# Patient Record
Sex: FEMALE | Race: WHITE | NOT HISPANIC OR LATINO | Employment: FULL TIME | URBAN - METROPOLITAN AREA
[De-identification: names, ages, dates, MRNs, and addresses within clinical notes are randomized per-mention and may not be internally consistent; named-entity substitution may affect disease eponyms.]

---

## 2016-06-01 LAB — PAP-ABSTRACT: NORMAL

## 2017-06-06 ENCOUNTER — OFFICE VISIT (OUTPATIENT)
Dept: FAMILY MEDICINE | Facility: CLINIC | Age: 27
End: 2017-06-06
Payer: COMMERCIAL

## 2017-06-06 VITALS
TEMPERATURE: 98.9 F | HEIGHT: 69 IN | BODY MASS INDEX: 27.4 KG/M2 | HEART RATE: 88 BPM | WEIGHT: 185 LBS | DIASTOLIC BLOOD PRESSURE: 86 MMHG | SYSTOLIC BLOOD PRESSURE: 132 MMHG

## 2017-06-06 DIAGNOSIS — Z01.419 ENCOUNTER FOR GYNECOLOGICAL EXAMINATION WITHOUT ABNORMAL FINDING: Primary | ICD-10-CM

## 2017-06-06 DIAGNOSIS — Z30.41 ENCOUNTER FOR SURVEILLANCE OF CONTRACEPTIVE PILLS: ICD-10-CM

## 2017-06-06 LAB
ALBUMIN SERPL-MCNC: 3.6 G/DL (ref 3.4–5)
ALP SERPL-CCNC: 85 U/L (ref 40–150)
ALT SERPL W P-5'-P-CCNC: 21 U/L (ref 0–50)
ANION GAP SERPL CALCULATED.3IONS-SCNC: 10 MMOL/L (ref 3–14)
AST SERPL W P-5'-P-CCNC: 14 U/L (ref 0–45)
BILIRUB SERPL-MCNC: 0.4 MG/DL (ref 0.2–1.3)
BUN SERPL-MCNC: 12 MG/DL (ref 7–30)
CALCIUM SERPL-MCNC: 8.6 MG/DL (ref 8.5–10.1)
CHLORIDE SERPL-SCNC: 104 MMOL/L (ref 94–109)
CO2 SERPL-SCNC: 24 MMOL/L (ref 20–32)
CREAT SERPL-MCNC: 0.6 MG/DL (ref 0.52–1.04)
ERYTHROCYTE [DISTWIDTH] IN BLOOD BY AUTOMATED COUNT: 12.9 % (ref 10–15)
GFR SERPL CREATININE-BSD FRML MDRD: NORMAL ML/MIN/1.7M2
GLUCOSE SERPL-MCNC: 83 MG/DL (ref 70–99)
HCT VFR BLD AUTO: 36.2 % (ref 35–47)
HGB BLD-MCNC: 11.9 G/DL (ref 11.7–15.7)
MCH RBC QN AUTO: 31.4 PG (ref 26.5–33)
MCHC RBC AUTO-ENTMCNC: 32.9 G/DL (ref 31.5–36.5)
MCV RBC AUTO: 96 FL (ref 78–100)
PLATELET # BLD AUTO: 334 10E9/L (ref 150–450)
POTASSIUM SERPL-SCNC: 3.5 MMOL/L (ref 3.4–5.3)
PROT SERPL-MCNC: 7.2 G/DL (ref 6.8–8.8)
RBC # BLD AUTO: 3.79 10E12/L (ref 3.8–5.2)
SODIUM SERPL-SCNC: 138 MMOL/L (ref 133–144)
TSH SERPL DL<=0.005 MIU/L-ACNC: 2.78 MU/L (ref 0.4–4)
WBC # BLD AUTO: 9.2 10E9/L (ref 4–11)

## 2017-06-06 PROCEDURE — 85027 COMPLETE CBC AUTOMATED: CPT | Performed by: NURSE PRACTITIONER

## 2017-06-06 PROCEDURE — 80053 COMPREHEN METABOLIC PANEL: CPT | Performed by: NURSE PRACTITIONER

## 2017-06-06 PROCEDURE — 84443 ASSAY THYROID STIM HORMONE: CPT | Performed by: NURSE PRACTITIONER

## 2017-06-06 PROCEDURE — 99395 PREV VISIT EST AGE 18-39: CPT | Performed by: NURSE PRACTITIONER

## 2017-06-06 PROCEDURE — 36415 COLL VENOUS BLD VENIPUNCTURE: CPT | Performed by: NURSE PRACTITIONER

## 2017-06-06 RX ORDER — NORGESTIMATE AND ETHINYL ESTRADIOL 7DAYSX3 28
1 KIT ORAL DAILY
Qty: 84 TABLET | Refills: 3 | Status: SHIPPED | OUTPATIENT
Start: 2017-06-06 | End: 2021-08-10

## 2017-06-06 RX ORDER — CETIRIZINE HYDROCHLORIDE 10 MG/1
10 TABLET ORAL PRN
COMMUNITY
End: 2022-02-22

## 2017-06-06 NOTE — NURSING NOTE
"Chief Complaint   Patient presents with     Physical       Initial /86 (Cuff Size: Adult Regular)  Pulse 88  Temp 98.9  F (37.2  C) (Tympanic)  Ht 5' 9.25\" (1.759 m)  Wt 185 lb (83.9 kg)  LMP 05/24/2017  BMI 27.12 kg/m2 Estimated body mass index is 27.12 kg/(m^2) as calculated from the following:    Height as of this encounter: 5' 9.25\" (1.759 m).    Weight as of this encounter: 185 lb (83.9 kg).  Medication Reconciliation: complete    Health Maintenance that is potentially due pending provider review:  Pap Smear    Pt had PAP done on this date June 2016 , with this group [facility], results were normal      "

## 2017-06-06 NOTE — MR AVS SNAPSHOT
After Visit Summary   6/6/2017    Abimbola Monreal    MRN: [id number]           Patient Information     Date Of Birth          1990        Visit Information        Provider Department      6/6/2017 3:20 PM Monica Fields APRN [facility]        Today's Diagnoses     Encounter for gynecological examination without abnormal finding    -  1    Encounter for surveillance of contraceptive pills          Care Instructions      Preventive Health Recommendations  Female Ages 26 - 39  Yearly exam:   See your health care provider every year in order to    Review health changes.     Discuss preventive care.      Review your medicines if you your doctor has prescribed any.    Until age 30: Get a Pap test every three years (more often if you have had an abnormal result).    After age 30: Talk to your doctor about whether you should have a Pap test every 3 years or have a Pap test with HPV screening every 5 years.   You do not need a Pap test if your uterus was removed (hysterectomy) and you have not had cancer.  You should be tested each year for STDs (sexually transmitted diseases), if you're at risk.   Talk to your provider about how often to have your cholesterol checked.  If you are at risk for diabetes, you should have a diabetes test (fasting glucose).  Shots: Get a flu shot each year. Get a tetanus shot every 10 years.   Nutrition:     Eat at least 5 servings of fruits and vegetables each day.    Eat whole-grain bread, whole-wheat pasta and brown rice instead of white grains and rice.    Talk to your provider about Calcium and Vitamin D.     Lifestyle    Exercise at least 150 minutes a week (30 minutes a day, 5 days of the week). This will help you control your weight and prevent disease.    Limit alcohol to one drink per day.    No smoking.     Wear sunscreen to prevent skin cancer.    See your dentist every six months for an exam and cleaning.            Follow-ups after  "your visit        Who to contact     If you have questions or need follow up information about today's clinic visit or your schedule please contact [facility] directly at [phone].  Normal or non-critical lab and imaging results will be communicated to you by MyChart, letter or phone within 4 business days after the clinic has received the results. If you do not hear from us within 7 days, please contact the clinic through MyChart or phone. If you have a critical or abnormal lab result, we will notify you by phone as soon as possible.  Submit refill requests through [email or web address] or call your pharmacy and they will forward the refill request to us. Please allow 3 business days for your refill to be completed.          Additional Information About Your Visit        [facility] Information     [email or web address] lets you send messages to your doctor, view your test results, renew your prescriptions, schedule appointments and more. To sign up, go to [email or web address] . Click on \"Log in\" on the left side of the screen, which will take you to the Welcome page. Then click on \"Sign up Now\" on the right side of the page.     You will be asked to enter the access code listed below, as well as some personal information. Please follow the directions to create your username and password.     Your access code is: [email or web address]  Expires: 2017  3:57 PM     Your access code will  in 90 days. If you need help or a new code, please call your [facility] clinic or [phone].        Care EveryWhere ID     This is your Care EveryWhere ID. This could be used by other organizations to access your [facility] medical records  [id number]        Your Vitals Were     Pulse Temperature Height Last Period BMI (Body Mass Index)       88 98.9  F (37.2  C) (Tympanic) 5' 9.25\" (1.759 m) 2017 27.12 kg/m2        Blood Pressure from Last 3 Encounters:   17 132/86   16 (!) 131/95   02/20/15 130/80    Weight from Last 3 " Encounters:   06/06/17 185 lb (83.9 kg)   04/29/16 167 lb 12.8 oz (76.1 kg)   02/20/15 159 lb (72.1 kg)              We Performed the Following     ABSTRACT PAP-NO CHARGE     CBC with platelets     Comprehensive metabolic panel     TSH with free T4 reflex          Today's Medication Changes          These changes are accurate as of: 6/6/17  3:57 PM.  If you have any questions, ask your nurse or doctor.               Start taking these medicines.        Dose/Directions    norgestim-eth estrad triphasic 0.18/0.215/0.25 MG-35 MCG per tablet   Commonly known as:  TRINESSA (28)   Used for:  Encounter for surveillance of contraceptive pills   Started by:  Monica Fields APRN CNP        Dose:  1 tablet   Take 1 tablet by mouth daily   Quantity:  84 tablet   Refills:  3         Stop taking these medicines if you haven't already. Please contact your care team if you have questions.     Levonorgestrel-Eth Estrad & FA 0.1-20 & 1 MG-MCG &MG Kit   Stopped by:  Monica Fields APRN CNP                Where to get your medicines      These medications were sent to [id number] - [place], [place] - [address]   [address] [facility] [address]     Phone:  [phone]     norgestim-eth estrad triphasic 0.18/0.215/0.25 MG-35 MCG per tablet                Primary Care Provider Office Phone # Fax #    Edward Thomas [phone]       [facility] [address] [facility] [address]        Thank you!     Thank you for choosing [facility]  for your care. Our goal is always to provide you with excellent care. Hearing back from our patients is one way we can continue to improve our services. Please take a few minutes to complete the written survey that you may receive in the mail after your visit with us. Thank you!             Your Updated Medication List - Protect others around you: Learn how to safely use, store and throw away your medicines at  www.disposemymeds.org.          This list is accurate as of: 6/6/17  3:57 PM.  Always use your most recent med list.                   Brand Name Dispense Instructions for use    cetirizine 10 MG tablet    zyrTEC     Take 10 mg by mouth as needed for allergies       norgestim-eth estrad triphasic 0.18/0.215/0.25 MG-35 MCG per tablet    TRINESSA (28)    84 tablet    Take 1 tablet by mouth daily
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
## 2017-06-06 NOTE — LETTER
[facility]  [address] [place] [address]  [phone]    June 6, 2017        Abimbola Monreal  [address] [facility] [address]          To whom it may concern:    This patient was seen for a wellness exam 6/6/2017.    Please contact me for questions or concerns.        Sincerely,        Monica Fields CNP

## 2017-11-10 ENCOUNTER — RECORDS - HEALTHEAST (OUTPATIENT)
Dept: LAB | Facility: HOSPITAL | Age: 27
End: 2017-11-10

## 2017-11-10 LAB — HBV SURFACE AB SERPL IA-ACNC: NEGATIVE M[IU]/ML

## 2018-01-24 ENCOUNTER — RECORDS - HEALTHEAST (OUTPATIENT)
Dept: LAB | Facility: HOSPITAL | Age: 28
End: 2018-01-24

## 2018-01-24 LAB — HBV SURFACE AB SERPL IA-ACNC: POSITIVE M[IU]/ML

## 2018-04-11 ENCOUNTER — OFFICE VISIT - HEALTHEAST (OUTPATIENT)
Dept: FAMILY MEDICINE | Facility: CLINIC | Age: 28
End: 2018-04-11

## 2018-04-11 ENCOUNTER — COMMUNICATION - HEALTHEAST (OUTPATIENT)
Dept: TELEHEALTH | Facility: CLINIC | Age: 28
End: 2018-04-11

## 2018-04-11 DIAGNOSIS — Z30.09 GENERAL COUNSELING AND ADVICE FOR CONTRACEPTIVE MANAGEMENT: ICD-10-CM

## 2018-04-11 ASSESSMENT — MIFFLIN-ST. JEOR: SCORE: 1589.05

## 2018-07-26 ENCOUNTER — OFFICE VISIT - HEALTHEAST (OUTPATIENT)
Dept: FAMILY MEDICINE | Facility: CLINIC | Age: 28
End: 2018-07-26

## 2018-07-26 DIAGNOSIS — F33.9 MAJOR DEPRESSION, RECURRENT (H): ICD-10-CM

## 2018-07-26 ASSESSMENT — MIFFLIN-ST. JEOR: SCORE: 1610.03

## 2018-07-30 ENCOUNTER — OFFICE VISIT - HEALTHEAST (OUTPATIENT)
Dept: FAMILY MEDICINE | Facility: CLINIC | Age: 28
End: 2018-07-30

## 2018-07-30 DIAGNOSIS — F41.9 ANXIETY: ICD-10-CM

## 2018-07-30 ASSESSMENT — MIFFLIN-ST. JEOR: SCORE: 1598.69

## 2018-08-07 ENCOUNTER — COMMUNICATION - HEALTHEAST (OUTPATIENT)
Dept: TELEHEALTH | Facility: CLINIC | Age: 28
End: 2018-08-07

## 2018-08-07 ENCOUNTER — COMMUNICATION - HEALTHEAST (OUTPATIENT)
Dept: FAMILY MEDICINE | Facility: CLINIC | Age: 28
End: 2018-08-07

## 2018-09-04 ENCOUNTER — OFFICE VISIT - HEALTHEAST (OUTPATIENT)
Dept: FAMILY MEDICINE | Facility: CLINIC | Age: 28
End: 2018-09-04

## 2018-09-04 DIAGNOSIS — F41.9 ANXIETY: ICD-10-CM

## 2018-09-04 DIAGNOSIS — F32.1 MAJOR DEPRESSIVE DISORDER, SINGLE EPISODE, MODERATE (H): ICD-10-CM

## 2018-09-04 ASSESSMENT — MIFFLIN-ST. JEOR: SCORE: 1619.1

## 2018-10-10 ENCOUNTER — HOSPITAL ENCOUNTER (EMERGENCY)
Facility: CLINIC | Age: 28
Discharge: HOME | End: 2018-10-10
Attending: EMERGENCY MEDICINE | Admitting: EMERGENCY MEDICINE
Payer: COMMERCIAL

## 2018-10-10 ENCOUNTER — RECORDS - HEALTHEAST (OUTPATIENT)
Dept: ADMINISTRATIVE | Facility: OTHER | Age: 28
End: 2018-10-10

## 2018-10-10 DIAGNOSIS — T44.3X1A ANTICHOLINERGIC DRUG OVERDOSE, ACCIDENTAL OR UNINTENTIONAL, INITIAL ENCOUNTER: ICD-10-CM

## 2018-10-10 DIAGNOSIS — E87.6 HYPOKALEMIA: ICD-10-CM

## 2018-10-10 LAB
ANION GAP SERPL CALCULATED.3IONS-SCNC: 11 MMOL/L (ref 3–14)
APAP SERPL-MCNC: <2 MG/L (ref 10–20)
BASOPHILS # BLD AUTO: 0 10E9/L (ref 0–0.2)
BASOPHILS NFR BLD AUTO: 0.3 %
BUN SERPL-MCNC: 9 MG/DL (ref 7–30)
CALCIUM SERPL-MCNC: 8.3 MG/DL (ref 8.5–10.1)
CHLORIDE SERPL-SCNC: 103 MMOL/L (ref 94–109)
CO2 SERPL-SCNC: 23 MMOL/L (ref 20–32)
CREAT SERPL-MCNC: 0.63 MG/DL (ref 0.52–1.04)
DIFFERENTIAL METHOD BLD: NORMAL
EOSINOPHIL # BLD AUTO: 0.3 10E9/L (ref 0–0.7)
EOSINOPHIL NFR BLD AUTO: 4.1 %
ERYTHROCYTE [DISTWIDTH] IN BLOOD BY AUTOMATED COUNT: 13.4 % (ref 10–15)
GFR SERPL CREATININE-BSD FRML MDRD: >90 ML/MIN/1.7M2
GLUCOSE SERPL-MCNC: 104 MG/DL (ref 70–99)
HCT VFR BLD AUTO: 42.2 % (ref 35–47)
HGB BLD-MCNC: 13.6 G/DL (ref 11.7–15.7)
IMM GRANULOCYTES # BLD: 0 10E9/L (ref 0–0.4)
IMM GRANULOCYTES NFR BLD: 0.3 %
LYMPHOCYTES # BLD AUTO: 3.1 10E9/L (ref 0.8–5.3)
LYMPHOCYTES NFR BLD AUTO: 43.7 %
MCH RBC QN AUTO: 31.1 PG (ref 26.5–33)
MCHC RBC AUTO-ENTMCNC: 32.2 G/DL (ref 31.5–36.5)
MCV RBC AUTO: 96 FL (ref 78–100)
MONOCYTES # BLD AUTO: 0.7 10E9/L (ref 0–1.3)
MONOCYTES NFR BLD AUTO: 10 %
NEUTROPHILS # BLD AUTO: 3 10E9/L (ref 1.6–8.3)
NEUTROPHILS NFR BLD AUTO: 41.6 %
NRBC # BLD AUTO: 0 10*3/UL
NRBC BLD AUTO-RTO: 0 /100
PLATELET # BLD AUTO: 401 10E9/L (ref 150–450)
POTASSIUM SERPL-SCNC: 3.1 MMOL/L (ref 3.4–5.3)
RBC # BLD AUTO: 4.38 10E12/L (ref 3.8–5.2)
SALICYLATES SERPL-MCNC: <2 MG/DL
SODIUM SERPL-SCNC: 137 MMOL/L (ref 133–144)
WBC # BLD AUTO: 7.1 10E9/L (ref 4–11)

## 2018-10-10 PROCEDURE — 80048 BASIC METABOLIC PNL TOTAL CA: CPT | Performed by: EMERGENCY MEDICINE

## 2018-10-10 PROCEDURE — 80329 ANALGESICS NON-OPIOID 1 OR 2: CPT | Performed by: EMERGENCY MEDICINE

## 2018-10-10 PROCEDURE — 85025 COMPLETE CBC W/AUTO DIFF WBC: CPT | Performed by: EMERGENCY MEDICINE

## 2018-10-10 PROCEDURE — 93010 ELECTROCARDIOGRAM REPORT: CPT | Mod: Z6 | Performed by: EMERGENCY MEDICINE

## 2018-10-10 PROCEDURE — 25000132 ZZH RX MED GY IP 250 OP 250 PS 637: Performed by: EMERGENCY MEDICINE

## 2018-10-10 PROCEDURE — 93005 ELECTROCARDIOGRAM TRACING: CPT

## 2018-10-10 PROCEDURE — 99284 EMERGENCY DEPT VISIT MOD MDM: CPT | Mod: 25 | Performed by: EMERGENCY MEDICINE

## 2018-10-10 PROCEDURE — 99284 EMERGENCY DEPT VISIT MOD MDM: CPT | Mod: 25

## 2018-10-10 RX ORDER — POTASSIUM CHLORIDE 1500 MG/1
40 TABLET, EXTENDED RELEASE ORAL ONCE
Status: COMPLETED | OUTPATIENT
Start: 2018-10-10 | End: 2018-10-10

## 2018-10-10 RX ADMIN — POTASSIUM CHLORIDE 40 MEQ: 1500 TABLET, EXTENDED RELEASE ORAL at 20:54

## 2018-10-10 NOTE — ED AVS SNAPSHOT
[facility] Emergency Department    [address] [facility] [address]    Phone:  [phone]    Fax:  [phone]                                       Abimbola Monreal   MRN: [id number]    Department:  [facility]   Date of Visit:  10/10/2018           Patient Information     Date Of Birth          1990        Your diagnoses for this visit were:     Anticholinergic drug overdose, accidental or unintentional, initial encounter     Hypokalemia        You were seen by Thierry Phillips MD.        Discharge Instructions       Return to the emergency  if you have worsening symptoms, difficulty breathing, repeated vomiting, hallucinations, or other concerns.  Otherwise follow-up in clinic.        Discharge Instructions for Hypokalemia  You have been diagnosed with hypokalemia. This means you have a low level of potassium in your blood. Potassium helps your nerve and muscle cells work as they should. These cells include the cells in your heart. A low level of potassium in the blood can cause serious problems, such as abnormal heart rhythms and even heart attack.  Diet changes  Eat more potassium-rich foods:    Bananas    Oranges and orange juice    Tomatoes, tomato sauce, and tomato juice    Leafy green vegetables, such as spinach, kale, salad greens, collards, and chard    Melons (all kinds)    Pomegranates    Peas    Beans    Potatoes    Sweet potatoes    Avocados, including guacamole    Vegetable juices, such as V8    Fruit juices    All nuts and seeds    Fish, including tuna, halibut, salmon, cod, snapper, steve, swordfish, and perch    Milk, including fat-free, low-fat, whole, chocolate, and buttermilk    Soy milk  Other home care    Take a potassium supplement as directed by your healthcare provider.    After strenuous exercise or any activity that causes you to sweat a lot, grab a beverage high in potassium. This includes chocolate milk, coconut water, orange  juice, or low-sodium vegetable juices.    Be sure to eat foods or drink fluids that contain potassium if you are having diarrhea or vomiting.    Have your potassium levels checked regularly.    Take all medicines exactly as directed.    Tell your healthcare provider about all prescription and over-the-counter medicines you are taking. This includes herbal products.    Avoid foods that are high in salt. Avoid canned and prepared foods that are high in salt.  Follow-up    Make a follow-up appointment as directed by our staff.    Keep all follow-up appointments. Your healthcare provider needs to monitor your condition closely.     When to call your healthcare provider  Call your provider right away or go to the emergency room if you have any of the following:    Vomiting    Fatigue    Diarrhea    Rapid, irregular heartbeat    Shortness of breath    Chest pain    Muscle cramps, spasms, or twitching    Weakness    Paralysis   Date Last Reviewed: 6/1/2017 2000-2017 [employer]. [address] [place] [address]. All rights reserved. This information is not intended as a substitute for professional medical care. Always follow your healthcare professional's instructions.          24 Hour Appointment Hotline       To make an appointment at any [facility] call [phone] ([phone]). If you don't have a family doctor or clinic, we will help you find one. [facility] clinics are conveniently located to serve the needs of you and your family.             Review of your medicines      Our records show that you are taking the medicines listed below. If these are incorrect, please call your family doctor or clinic.        Dose / Directions Last dose taken    cetirizine 10 MG tablet   Commonly known as:  zyrTEC   Dose:  10 mg        Take 10 mg by mouth as needed for allergies   Refills:  0        norgestim-eth estrad triphasic 0.18/0.215/0.25 MG-35 MCG per tablet   Commonly known as:  TRINESSA  (28)   Dose:  1 tablet   Quantity:  84 tablet        Take 1 tablet by mouth daily   Refills:  3                Procedures and tests performed during your visit     Acetaminophen level    Basic metabolic panel    CBC with platelets differential    EKG 12-lead, tracing only    Salicylate level      Orders Needing Specimen Collection     None      Pending Results     No orders found from 10/8/2018 to 10/11/2018.            Pending Culture Results     No orders found from 10/8/2018 to 10/11/2018.            Pending Results Instructions     If you had any lab results that were not finalized at the time of your Discharge, you can call the ED Lab Result RN at [phone]. You will be contacted by this team for any positive Lab results or changes in treatment. The nurses are available 7 days a week from 10A to 6:30P.  You can leave a message 24 hours per day and they will return your call.        Test Results From Your Hospital Stay        10/10/2018  8:26 PM      Component Results     Component Value Ref Range & Units Status    Sodium 137 133 - 144 mmol/L Final    Potassium 3.1 (L) 3.4 - 5.3 mmol/L Final    Chloride 103 94 - 109 mmol/L Final    Carbon Dioxide 23 20 - 32 mmol/L Final    Anion Gap 11 3 - 14 mmol/L Final    Glucose 104 (H) 70 - 99 mg/dL Final    Urea Nitrogen 9 7 - 30 mg/dL Final    Creatinine 0.63 0.52 - 1.04 mg/dL Final    GFR Estimate >90 >60 mL/min/1.7m2 Final    Non  GFR Calc    GFR Estimate If Black >90 >60 mL/min/1.7m2 Final    African American GFR Calc    Calcium 8.3 (L) 8.5 - 10.1 mg/dL Final         10/10/2018  8:14 PM      Component Results     Component Value Ref Range & Units Status    WBC 7.1 4.0 - 11.0 10e9/L Final    RBC Count 4.38 3.8 - 5.2 10e12/L Final    Hemoglobin 13.6 11.7 - 15.7 g/dL Final    Hematocrit 42.2 35.0 - 47.0 % Final    MCV 96 78 - 100 fl Final    MCH 31.1 26.5 - 33.0 pg Final    MCHC 32.2 31.5 - 36.5 g/dL Final    RDW 13.4 10.0 - 15.0 % Final    Platelet  "Count 401 150 - 450 10e9/L Final    Diff Method Automated Method  Final    % Neutrophils 41.6 % Final    % Lymphocytes 43.7 % Final    % Monocytes 10.0 % Final    % Eosinophils 4.1 % Final    % Basophils 0.3 % Final    % Immature Granulocytes 0.3 % Final    Nucleated RBCs 0 0 /100 Final    Absolute Neutrophil 3.0 1.6 - 8.3 10e9/L Final    Absolute Lymphocytes 3.1 0.8 - 5.3 10e9/L Final    Absolute Monocytes 0.7 0.0 - 1.3 10e9/L Final    Absolute Eosinophils 0.3 0.0 - 0.7 10e9/L Final    Absolute Basophils 0.0 0.0 - 0.2 10e9/L Final    Abs Immature Granulocytes 0.0 0 - 0.4 10e9/L Final    Absolute Nucleated RBC 0.0  Final         10/10/2018  8:33 PM      Component Results     Component Value Ref Range & Units Status    Acetaminophen Level <2 mg/L Final    Therapeutic range: 10-20 mg/L         10/10/2018  8:28 PM      Component Results     Component Value Ref Range & Units Status    Salicylate Level <2 mg/dL Final    Therapeutic:        <20  Anti inflammatory:  15-30                  Thank you for choosing [facility]       Thank you for choosing [facility] for your care. Our goal is always to provide you with excellent care. Hearing back from our patients is one way we can continue to improve our services. Please take a few minutes to complete the written survey that you may receive in the mail after you visit with us. Thank you!        [employer] Information     [employer] lets you send messages to your doctor, view your test results, renew your prescriptions, schedule appointments and more. To sign up, go to [email or web address] . Click on \"Log in\" on the left side of the screen, which will take you to the Welcome page. Then click on \"Sign up Now\" on the right side of the page.     You will be asked to enter the access code listed below, as well as some personal information. Please follow the directions to create your username and password.     Your access code is: [email or web address]  Expires: 1/8/2019 11:40 PM     Your access " code will  in 90 days. If you need help or a new code, please call your [place] clinic or [phone].        Care EveryWhere ID     This is your Care EveryWhere ID. This could be used by other organizations to access your [facility] medical records  [id number]        Equal Access to Services     YECENIA FRAGOSO : Mark vera Soevelia, waaxda luqadaha, qaybta kaalmada cookie, carol hyman. So [facility] [phone].    ATENCIÓN: Si habla español, tiene a lira disposición servicios gratuitos de asistencia lingüística. Llame al [phone].    We comply with applicable federal civil rights laws and [place] laws. We do not discriminate on the basis of race, color, national origin, age, disability, sex, sexual orientation, or gender identity.            After Visit Summary       This is your record. Keep this with you and show to your community pharmacist(s) and doctor(s) at your next visit.

## 2018-10-10 NOTE — ED AVS SNAPSHOT
[facility] Emergency Department    [address] [facility] [address]    Phone:  [phone]    Fax:  [phone]                                       Abimbola Monreal   MRN: [id number]    Department:  [facility] Department   Date of Visit:  10/10/2018           After Visit Summary Signature Page     I have received my discharge instructions, and my questions have been answered. I have discussed any challenges I see with this plan with the nurse or doctor.    ..........................................................................................................................................  Patient/Patient Representative Signature      ..........................................................................................................................................  Patient Representative Print Name and Relationship to Patient    ..................................................               ................................................  Date                                   Time    ..........................................................................................................................................  Reviewed by Signature/Title    ...................................................              ..............................................  Date                                               Time          22EPIC Rev 08/18

## 2018-10-11 VITALS
RESPIRATION RATE: 18 BRPM | HEART RATE: 144 BPM | SYSTOLIC BLOOD PRESSURE: 147 MMHG | HEIGHT: 69 IN | BODY MASS INDEX: 26.66 KG/M2 | TEMPERATURE: 98.4 F | DIASTOLIC BLOOD PRESSURE: 105 MMHG | WEIGHT: 180 LBS | OXYGEN SATURATION: 98 %

## 2018-10-11 NOTE — DISCHARGE INSTRUCTIONS
Return to the emergency  if you have worsening symptoms, difficulty breathing, repeated vomiting, hallucinations, or other concerns.  Otherwise follow-up in clinic.        Discharge Instructions for Hypokalemia  You have been diagnosed with hypokalemia. This means you have a low level of potassium in your blood. Potassium helps your nerve and muscle cells work as they should. These cells include the cells in your heart. A low level of potassium in the blood can cause serious problems, such as abnormal heart rhythms and even heart attack.  Diet changes  Eat more potassium-rich foods:    Bananas    Oranges and orange juice    Tomatoes, tomato sauce, and tomato juice    Leafy green vegetables, such as spinach, kale, salad greens, collards, and chard    Melons (all kinds)    Pomegranates    Peas    Beans    Potatoes    Sweet potatoes    Avocados, including guacamole    Vegetable juices, such as V8    Fruit juices    All nuts and seeds    Fish, including tuna, halibut, salmon, cod, snapper, steve, swordfish, and perch    Milk, including fat-free, low-fat, whole, chocolate, and buttermilk    Soy milk  Other home care    Take a potassium supplement as directed by your healthcare provider.    After strenuous exercise or any activity that causes you to sweat a lot, grab a beverage high in potassium. This includes chocolate milk, coconut water, orange juice, or low-sodium vegetable juices.    Be sure to eat foods or drink fluids that contain potassium if you are having diarrhea or vomiting.    Have your potassium levels checked regularly.    Take all medicines exactly as directed.    Tell your healthcare provider about all prescription and over-the-counter medicines you are taking. This includes herbal products.    Avoid foods that are high in salt. Avoid canned and prepared foods that are high in salt.  Follow-up    Make a follow-up appointment as directed by our staff.    Keep all follow-up appointments. Your healthcare  provider needs to monitor your condition closely.     When to call your healthcare provider  Call your provider right away or go to the emergency room if you have any of the following:    Vomiting    Fatigue    Diarrhea    Rapid, irregular heartbeat    Shortness of breath    Chest pain    Muscle cramps, spasms, or twitching    Weakness    Paralysis   Date Last Reviewed: 6/1/2017 2000-2017 [employer]. [address] [place] [id number]. All rights reserved. This information is not intended as a substitute for professional medical care. Always follow your healthcare professional's instructions.

## 2018-10-11 NOTE — ED NOTES
Patient  Has been under a lot of stress  Working going to school  Works overnight and mother recently had MI  So has been unable to sleep  Has been also having cough a lot and unable to sleep
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
## 2018-10-12 ENCOUNTER — COMMUNICATION - HEALTHEAST (OUTPATIENT)
Dept: FAMILY MEDICINE | Facility: CLINIC | Age: 28
End: 2018-10-12

## 2018-12-31 ENCOUNTER — HOSPITAL ENCOUNTER (EMERGENCY)
Facility: CLINIC | Age: 28
Discharge: HOME | End: 2018-12-31
Attending: EMERGENCY MEDICINE | Admitting: EMERGENCY MEDICINE
Payer: COMMERCIAL

## 2018-12-31 ENCOUNTER — RECORDS - HEALTHEAST (OUTPATIENT)
Dept: ADMINISTRATIVE | Facility: OTHER | Age: 28
End: 2018-12-31

## 2018-12-31 VITALS
OXYGEN SATURATION: 97 % | RESPIRATION RATE: 8 BRPM | SYSTOLIC BLOOD PRESSURE: 155 MMHG | DIASTOLIC BLOOD PRESSURE: 105 MMHG | HEART RATE: 117 BPM | TEMPERATURE: 98.2 F | HEIGHT: 69 IN | BODY MASS INDEX: 26.66 KG/M2 | WEIGHT: 180 LBS

## 2018-12-31 DIAGNOSIS — F10.10 ALCOHOL ABUSE, EPISODIC DRINKING BEHAVIOR: ICD-10-CM

## 2018-12-31 DIAGNOSIS — E87.29 ALCOHOLIC KETOACIDOSIS: ICD-10-CM

## 2018-12-31 DIAGNOSIS — F10.10 ALCOHOL CONSUMPTION BINGE DRINKING: ICD-10-CM

## 2018-12-31 DIAGNOSIS — R11.2 NON-INTRACTABLE VOMITING WITH NAUSEA, UNSPECIFIED VOMITING TYPE: ICD-10-CM

## 2018-12-31 LAB
ANION GAP SERPL CALCULATED.3IONS-SCNC: 15 MMOL/L (ref 3–14)
BASOPHILS # BLD AUTO: 0.1 10E9/L (ref 0–0.2)
BASOPHILS NFR BLD AUTO: 0.6 %
BUN SERPL-MCNC: 12 MG/DL (ref 7–30)
CALCIUM SERPL-MCNC: 7.7 MG/DL (ref 8.5–10.1)
CHLORIDE SERPL-SCNC: 101 MMOL/L (ref 94–109)
CO2 SERPL-SCNC: 21 MMOL/L (ref 20–32)
CREAT SERPL-MCNC: 0.56 MG/DL (ref 0.52–1.04)
DIFFERENTIAL METHOD BLD: ABNORMAL
EOSINOPHIL # BLD AUTO: 0.1 10E9/L (ref 0–0.7)
EOSINOPHIL NFR BLD AUTO: 0.3 %
ERYTHROCYTE [DISTWIDTH] IN BLOOD BY AUTOMATED COUNT: 13.9 % (ref 10–15)
GFR SERPL CREATININE-BSD FRML MDRD: >90 ML/MIN/{1.73_M2}
GLUCOSE SERPL-MCNC: 101 MG/DL (ref 70–99)
HCG UR QL: NEGATIVE
HCT VFR BLD AUTO: 42.5 % (ref 35–47)
HGB BLD-MCNC: 14.3 G/DL (ref 11.7–15.7)
IMM GRANULOCYTES # BLD: 0.1 10E9/L (ref 0–0.4)
IMM GRANULOCYTES NFR BLD: 0.3 %
LYMPHOCYTES # BLD AUTO: 3.3 10E9/L (ref 0.8–5.3)
LYMPHOCYTES NFR BLD AUTO: 21.6 %
MCH RBC QN AUTO: 31.2 PG (ref 26.5–33)
MCHC RBC AUTO-ENTMCNC: 33.6 G/DL (ref 31.5–36.5)
MCV RBC AUTO: 93 FL (ref 78–100)
MONOCYTES # BLD AUTO: 0.8 10E9/L (ref 0–1.3)
MONOCYTES NFR BLD AUTO: 5.3 %
NEUTROPHILS # BLD AUTO: 11 10E9/L (ref 1.6–8.3)
NEUTROPHILS NFR BLD AUTO: 71.9 %
NRBC # BLD AUTO: 0 10*3/UL
NRBC BLD AUTO-RTO: 0 /100
PLATELET # BLD AUTO: 491 10E9/L (ref 150–450)
POTASSIUM SERPL-SCNC: 3.2 MMOL/L (ref 3.4–5.3)
RBC # BLD AUTO: 4.58 10E12/L (ref 3.8–5.2)
SODIUM SERPL-SCNC: 137 MMOL/L (ref 133–144)
WBC # BLD AUTO: 15.3 10E9/L (ref 4–11)

## 2018-12-31 PROCEDURE — 93010 ELECTROCARDIOGRAM REPORT: CPT | Mod: Z6 | Performed by: EMERGENCY MEDICINE

## 2018-12-31 PROCEDURE — 96361 HYDRATE IV INFUSION ADD-ON: CPT | Performed by: EMERGENCY MEDICINE

## 2018-12-31 PROCEDURE — 99284 EMERGENCY DEPT VISIT MOD MDM: CPT | Mod: 25 | Performed by: EMERGENCY MEDICINE

## 2018-12-31 PROCEDURE — 85025 COMPLETE CBC W/AUTO DIFF WBC: CPT | Performed by: EMERGENCY MEDICINE

## 2018-12-31 PROCEDURE — 96374 THER/PROPH/DIAG INJ IV PUSH: CPT | Performed by: EMERGENCY MEDICINE

## 2018-12-31 PROCEDURE — 81025 URINE PREGNANCY TEST: CPT | Performed by: EMERGENCY MEDICINE

## 2018-12-31 PROCEDURE — 93005 ELECTROCARDIOGRAM TRACING: CPT | Performed by: EMERGENCY MEDICINE

## 2018-12-31 PROCEDURE — 96376 TX/PRO/DX INJ SAME DRUG ADON: CPT | Performed by: EMERGENCY MEDICINE

## 2018-12-31 PROCEDURE — 25000132 ZZH RX MED GY IP 250 OP 250 PS 637: Performed by: EMERGENCY MEDICINE

## 2018-12-31 PROCEDURE — 25000128 H RX IP 250 OP 636: Performed by: EMERGENCY MEDICINE

## 2018-12-31 PROCEDURE — 80048 BASIC METABOLIC PNL TOTAL CA: CPT | Performed by: EMERGENCY MEDICINE

## 2018-12-31 RX ORDER — ONDANSETRON 4 MG/1
4 TABLET, ORALLY DISINTEGRATING ORAL EVERY 8 HOURS PRN
Qty: 10 TABLET | Refills: 0 | Status: SHIPPED | OUTPATIENT
Start: 2018-12-31 | End: 2019-01-03

## 2018-12-31 RX ORDER — DIAZEPAM 5 MG
5 TABLET ORAL ONCE
Status: COMPLETED | OUTPATIENT
Start: 2018-12-31 | End: 2018-12-31

## 2018-12-31 RX ORDER — FLUOXETINE 40 MG/1
40 CAPSULE ORAL DAILY
Status: ON HOLD | COMMUNITY
End: 2019-10-29

## 2018-12-31 RX ORDER — SODIUM CHLORIDE, SODIUM LACTATE, POTASSIUM CHLORIDE, CALCIUM CHLORIDE 600; 310; 30; 20 MG/100ML; MG/100ML; MG/100ML; MG/100ML
1000 INJECTION, SOLUTION INTRAVENOUS CONTINUOUS
Status: DISCONTINUED | OUTPATIENT
Start: 2018-12-31 | End: 2018-12-31 | Stop reason: HOSPADM

## 2018-12-31 RX ORDER — ONDANSETRON 2 MG/ML
4 INJECTION INTRAMUSCULAR; INTRAVENOUS ONCE
Status: COMPLETED | OUTPATIENT
Start: 2018-12-31 | End: 2018-12-31

## 2018-12-31 RX ORDER — ONDANSETRON 2 MG/ML
4 INJECTION INTRAMUSCULAR; INTRAVENOUS
Status: COMPLETED | OUTPATIENT
Start: 2018-12-31 | End: 2018-12-31

## 2018-12-31 RX ADMIN — SODIUM CHLORIDE, POTASSIUM CHLORIDE, SODIUM LACTATE AND CALCIUM CHLORIDE 1000 ML: 600; 310; 30; 20 INJECTION, SOLUTION INTRAVENOUS at 04:50

## 2018-12-31 RX ADMIN — DIAZEPAM 5 MG: 5 TABLET ORAL at 05:37

## 2018-12-31 RX ADMIN — SODIUM CHLORIDE, POTASSIUM CHLORIDE, SODIUM LACTATE AND CALCIUM CHLORIDE 1000 ML: 600; 310; 30; 20 INJECTION, SOLUTION INTRAVENOUS at 05:39

## 2018-12-31 RX ADMIN — ONDANSETRON 4 MG: 2 INJECTION INTRAMUSCULAR; INTRAVENOUS at 06:45

## 2018-12-31 RX ADMIN — DIAZEPAM 5 MG: 5 TABLET ORAL at 06:44

## 2018-12-31 RX ADMIN — ONDANSETRON 4 MG: 2 INJECTION INTRAMUSCULAR; INTRAVENOUS at 04:54

## 2018-12-31 RX ADMIN — SODIUM CHLORIDE, POTASSIUM CHLORIDE, SODIUM LACTATE AND CALCIUM CHLORIDE 1000 ML: 600; 310; 30; 20 INJECTION, SOLUTION INTRAVENOUS at 06:50

## 2018-12-31 ASSESSMENT — MIFFLIN-ST. JEOR: SCORE: 1610.85

## 2018-12-31 ASSESSMENT — ENCOUNTER SYMPTOMS
HEADACHES: 1
NUMBNESS: 0
TREMORS: 1
SEIZURES: 0
FATIGUE: 0
SPEECH DIFFICULTY: 0
LIGHT-HEADEDNESS: 0
DYSPHORIC MOOD: 1
DIAPHORESIS: 1
APPETITE CHANGE: 1
CHILLS: 0
SHORTNESS OF BREATH: 0
FEVER: 0
CONSTIPATION: 0
ABDOMINAL PAIN: 1
NERVOUS/ANXIOUS: 1
CONFUSION: 0
BACK PAIN: 0
NAUSEA: 1
DIARRHEA: 0
VOMITING: 1
CHEST TIGHTNESS: 0
HALLUCINATIONS: 0
SLEEP DISTURBANCE: 1
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
## 2018-12-31 NOTE — ED AVS SNAPSHOT
[facility] Emergency Department  [address] [address]  Phone:  [phone]  Fax:  [phone]                                    Abimbola Monreal   MRN: [id number]    Department:  [facility] Emergency Department   Date of Visit:  12/31/2018           After Visit Summary Signature Page    I have received my discharge instructions, and my questions have been answered. I have discussed any challenges I see with this plan with the nurse or doctor.    ..........................................................................................................................................  Patient/Patient Representative Signature      ..........................................................................................................................................  Patient Representative Print Name and Relationship to Patient    ..................................................               ................................................  Date                                   Time    ..........................................................................................................................................  Reviewed by Signature/Title    ...................................................              ..............................................  Date                                               Time          22EPIC Rev 08/18

## 2018-12-31 NOTE — ED PROVIDER NOTES
"     Emergency Department Patient Sign-out       Brief HPI:  This is a 28 year old female signed out to me by Dr. Contreras .  See initial ED Provider note for details of the presentation.            Significant Events prior to my assuming care: recent heavy alcohol use. Now with nausea and vomiting. Does not want detox or treatment at this time.      Exam:   Patient Vitals for the past 24 hrs:   BP Temp Temp src Pulse Heart Rate Resp SpO2 Height Weight   12/31/18 0600 (!) 146/98 -- -- 117 121 13 99 % -- --   12/31/18 0530 (!) 160/101 -- -- 135 128 12 97 % -- --   12/31/18 0500 (!) 161/98 -- -- 128 130 10 98 % -- --   12/31/18 0436 (!) 179/108 98.2  F (36.8  C) Oral 150 -- 18 97 % 1.753 m (5' 9\") 81.6 kg (180 lb)           ED RESULTS:   Results for orders placed or performed during the hospital encounter of 12/31/18 (from the past 24 hour(s))   Basic metabolic panel     Status: Abnormal    Collection Time: 12/31/18  4:51 AM   Result Value Ref Range    Sodium 137 133 - 144 mmol/L    Potassium 3.2 (L) 3.4 - 5.3 mmol/L    Chloride 101 94 - 109 mmol/L    Carbon Dioxide 21 20 - 32 mmol/L    Anion Gap 15 (H) 3 - 14 mmol/L    Glucose 101 (H) 70 - 99 mg/dL    Urea Nitrogen 12 7 - 30 mg/dL    Creatinine 0.56 0.52 - 1.04 mg/dL    GFR Estimate >90 >60 mL/min/[1.73_m2]    GFR Estimate If Black >90 >60 mL/min/[1.73_m2]    Calcium 7.7 (L) 8.5 - 10.1 mg/dL   CBC with platelets, differential     Status: Abnormal    Collection Time: 12/31/18  4:51 AM   Result Value Ref Range    WBC 15.3 (H) 4.0 - 11.0 10e9/L    RBC Count 4.58 3.8 - 5.2 10e12/L    Hemoglobin 14.3 11.7 - 15.7 g/dL    Hematocrit 42.5 35.0 - 47.0 %    MCV 93 78 - 100 fl    MCH 31.2 26.5 - 33.0 pg    MCHC 33.6 31.5 - 36.5 g/dL    RDW 13.9 10.0 - 15.0 %    Platelet Count 491 (H) 150 - 450 10e9/L    Diff Method Automated Method     % Neutrophils 71.9 %    % Lymphocytes 21.6 %    % Monocytes 5.3 %    % Eosinophils 0.3 %    % Basophils 0.6 %    % Immature Granulocytes 0.3 %    " Nucleated RBCs 0 0 /100    Absolute Neutrophil 11.0 (H) 1.6 - 8.3 10e9/L    Absolute Lymphocytes 3.3 0.8 - 5.3 10e9/L    Absolute Monocytes 0.8 0.0 - 1.3 10e9/L    Absolute Eosinophils 0.1 0.0 - 0.7 10e9/L    Absolute Basophils 0.1 0.0 - 0.2 10e9/L    Abs Immature Granulocytes 0.1 0 - 0.4 10e9/L    Absolute Nucleated RBC 0.0        ED MEDICATIONS:   Medications   lactated ringers BOLUS 1,000 mL (0 mLs Intravenous Stopped 12/31/18 0538)     Followed by   lactated ringers BOLUS 1,000 mL (0 mLs Intravenous Stopped 12/31/18 0650)     Followed by   lactated ringers infusion (not administered)   lactated ringers BOLUS 1,000 mL (1,000 mLs Intravenous New Bag 12/31/18 0650)   ondansetron (ZOFRAN) injection 4 mg (4 mg Intravenous Given 12/31/18 0454)   ondansetron (ZOFRAN) injection 4 mg (4 mg Intravenous Given 12/31/18 0645)   diazepam (VALIUM) tablet 5 mg (5 mg Oral Given 12/31/18 0537)   diazepam (VALIUM) tablet 5 mg (5 mg Oral Given 12/31/18 0644)         Impression:    ICD-10-CM    1. Non-intractable vomiting with nausea, unspecified vomiting type R11.2    2. Alcohol abuse, episodic drinking behavior F10.10    3. Alcohol consumption binge drinking F10.10        Plan:    Pending studies include UPT. UPT negative.    ED COURSE:  After observation, patient cleared as expected. No signs of traumatic injuries, no complaints. AMS likely secondary to etoh. Nausea and vomiting improved.  Patient admits etoh; denies cp, sob, abd pain, focal neuro complaints.  Remainder of ROS negative.  Will discharge, reccommended abstinence from alcohol and drugs.         Thierry Roberts MD  12/31/18 5873

## 2018-12-31 NOTE — DISCHARGE INSTRUCTIONS
Please meet with your primary care doctor to discuss substance abuse. If you feel that you are in need of psychiatric help, please feel free to come to the ED.     Please return to the ED if you develop tremors, vomiting, hallucinations, headache, numbness, tingling, SOB, chest pain, or confusion.     Stop drinking so much alcohol. Continued drinking of excessive alcohol will provide many ways for you to get very sick and/or die prematurely. These include but are not limited to a stomach or intestinal bleed from esophageal varices, gastritis, ulcer, or tear in your esophagus. This is often accompanied by vomiting blood and bloody diarrhea.     Excessive drinking very often leads to heart failure or liver failure, and premature death from those causes as well. Liver failure can also lead to hepatic encephalopathy which means you won't be able to think clearly and you may even be in a coma. Additionally, excessive alcohol can cause pancreatitis which is an extremely painful disease process with many potential terrible complications.     Additionally, being intoxicated makes you more vulnerable to being raped, robbed, mugged, assaulted, murdered, or do something that will require snf time. You are more likely to lose your possessions or have them stolen. If you are a male, your sexual performance will likely suffer and your testicles will decrease in size with continued alcohol abuse. With eventual liver failure, your blood can have impaired clotting and make you more prone to bleeding from your gastrointestinal tract or your head should you suffer any kind of head trauma. This can result in paralysis or death. Excessive alcohol use can contribute to seizures and seizure disorders. Having a seizure prevents you from driving a car for at least six months and can impair your ability to work.     Please consider cutting down or finding a treatment program to avoid these most unpleasant complications and/or dying much  earlier than you should. Healthy recommendations are 1 drink/day for women and 2 drinks/day for men with no binge drinking.

## 2019-04-22 ENCOUNTER — COMMUNICATION - HEALTHEAST (OUTPATIENT)
Dept: FAMILY MEDICINE | Facility: CLINIC | Age: 29
End: 2019-04-22

## 2019-04-22 DIAGNOSIS — Z30.09 GENERAL COUNSELING AND ADVICE FOR CONTRACEPTIVE MANAGEMENT: ICD-10-CM

## 2019-05-02 ENCOUNTER — OFFICE VISIT - HEALTHEAST (OUTPATIENT)
Dept: FAMILY MEDICINE | Facility: CLINIC | Age: 29
End: 2019-05-02

## 2019-05-02 DIAGNOSIS — F41.9 ANXIETY: ICD-10-CM

## 2019-05-02 DIAGNOSIS — Z00.00 ROUTINE GENERAL MEDICAL EXAMINATION AT A HEALTH CARE FACILITY: ICD-10-CM

## 2019-05-02 DIAGNOSIS — F32.1 MAJOR DEPRESSIVE DISORDER, SINGLE EPISODE, MODERATE (H): ICD-10-CM

## 2019-05-02 DIAGNOSIS — Z82.49 FAMILY HISTORY OF ISCHEMIC HEART DISEASE: ICD-10-CM

## 2019-05-02 DIAGNOSIS — Z30.09 GENERAL COUNSELING AND ADVICE FOR CONTRACEPTIVE MANAGEMENT: ICD-10-CM

## 2019-05-02 LAB
CHOLEST SERPL-MCNC: 251 MG/DL
FASTING STATUS PATIENT QL REPORTED: YES
FASTING STATUS PATIENT QL REPORTED: YES
GLUCOSE BLD-MCNC: 87 MG/DL (ref 70–125)
HDLC SERPL-MCNC: 79 MG/DL
HGB BLD-MCNC: 13.4 G/DL (ref 12–16)
LDLC SERPL CALC-MCNC: 156 MG/DL
TRIGL SERPL-MCNC: 79 MG/DL

## 2019-05-02 ASSESSMENT — MIFFLIN-ST. JEOR: SCORE: 1667.29

## 2019-05-03 LAB
HPV SOURCE: NORMAL
HUMAN PAPILLOMA VIRUS 16 DNA: NEGATIVE
HUMAN PAPILLOMA VIRUS 18 DNA: NEGATIVE
HUMAN PAPILLOMA VIRUS FINAL DIAGNOSIS: NORMAL
HUMAN PAPILLOMA VIRUS OTHER HR: NEGATIVE
SPECIMEN DESCRIPTION: NORMAL

## 2019-05-09 LAB
BKR LAB AP ABNORMAL BLEEDING: NO
BKR LAB AP BIRTH CONTROL/HORMONES: NORMAL
BKR LAB AP CERVICAL APPEARANCE: NORMAL
BKR LAB AP GYN ADEQUACY: NORMAL
BKR LAB AP GYN INTERPRETATION: NORMAL
BKR LAB AP HPV REFLEX: NORMAL
BKR LAB AP LMP: NORMAL
BKR LAB AP PATIENT STATUS: NO
BKR LAB AP PREVIOUS ABNORMAL: NO
BKR LAB AP PREVIOUS NORMAL: NORMAL
HIGH RISK?: NO
PATH REPORT.COMMENTS IMP SPEC: NORMAL
RESULT FLAG (HE HISTORICAL CONVERSION): NORMAL

## 2019-07-08 ENCOUNTER — COMMUNICATION - HEALTHEAST (OUTPATIENT)
Dept: FAMILY MEDICINE | Facility: CLINIC | Age: 29
End: 2019-07-08

## 2019-07-08 ENCOUNTER — OFFICE VISIT - HEALTHEAST (OUTPATIENT)
Dept: FAMILY MEDICINE | Facility: CLINIC | Age: 29
End: 2019-07-08

## 2019-07-08 DIAGNOSIS — F32.1 MAJOR DEPRESSIVE DISORDER, SINGLE EPISODE, MODERATE (H): ICD-10-CM

## 2019-07-08 DIAGNOSIS — F41.9 ANXIETY: ICD-10-CM

## 2019-07-12 ENCOUNTER — COMMUNICATION - HEALTHEAST (OUTPATIENT)
Dept: FAMILY MEDICINE | Facility: CLINIC | Age: 29
End: 2019-07-12

## 2019-07-15 ENCOUNTER — COMMUNICATION - HEALTHEAST (OUTPATIENT)
Dept: FAMILY MEDICINE | Facility: CLINIC | Age: 29
End: 2019-07-15

## 2019-09-05 ENCOUNTER — OFFICE VISIT - HEALTHEAST (OUTPATIENT)
Dept: FAMILY MEDICINE | Facility: CLINIC | Age: 29
End: 2019-09-05

## 2019-09-05 DIAGNOSIS — F32.1 MAJOR DEPRESSIVE DISORDER, SINGLE EPISODE, MODERATE (H): ICD-10-CM

## 2019-09-05 DIAGNOSIS — F41.9 ANXIETY: ICD-10-CM

## 2019-09-05 ASSESSMENT — ANXIETY QUESTIONNAIRES
5. BEING SO RESTLESS THAT IT IS HARD TO SIT STILL: SEVERAL DAYS
4. TROUBLE RELAXING: NEARLY EVERY DAY
1. FEELING NERVOUS, ANXIOUS, OR ON EDGE: SEVERAL DAYS
GAD7 TOTAL SCORE: 8
2. NOT BEING ABLE TO STOP OR CONTROL WORRYING: SEVERAL DAYS
IF YOU CHECKED OFF ANY PROBLEMS ON THIS QUESTIONNAIRE, HOW DIFFICULT HAVE THESE PROBLEMS MADE IT FOR YOU TO DO YOUR WORK, TAKE CARE OF THINGS AT HOME, OR GET ALONG WITH OTHER PEOPLE: SOMEWHAT DIFFICULT
6. BECOMING EASILY ANNOYED OR IRRITABLE: NOT AT ALL
7. FEELING AFRAID AS IF SOMETHING AWFUL MIGHT HAPPEN: SEVERAL DAYS
3. WORRYING TOO MUCH ABOUT DIFFERENT THINGS: SEVERAL DAYS

## 2019-09-05 ASSESSMENT — PATIENT HEALTH QUESTIONNAIRE - PHQ9: SUM OF ALL RESPONSES TO PHQ QUESTIONS 1-9: 11

## 2019-09-05 ASSESSMENT — MIFFLIN-ST. JEOR: SCORE: 1676.36

## 2019-10-04 ENCOUNTER — RECORDS - HEALTHEAST (OUTPATIENT)
Dept: ADMINISTRATIVE | Facility: OTHER | Age: 29
End: 2019-10-04

## 2019-10-13 ENCOUNTER — HOSPITAL ENCOUNTER (EMERGENCY)
Facility: CLINIC | Age: 29
Discharge: ANOTHER HOSPITAL | End: 2019-10-13
Attending: EMERGENCY MEDICINE | Admitting: EMERGENCY MEDICINE
Payer: COMMERCIAL

## 2019-10-13 ENCOUNTER — RECORDS - HEALTHEAST (OUTPATIENT)
Dept: ADMINISTRATIVE | Facility: OTHER | Age: 29
End: 2019-10-13

## 2019-10-13 VITALS
HEART RATE: 115 BPM | OXYGEN SATURATION: 98 % | SYSTOLIC BLOOD PRESSURE: 149 MMHG | WEIGHT: 190 LBS | DIASTOLIC BLOOD PRESSURE: 102 MMHG | RESPIRATION RATE: 18 BRPM | BODY MASS INDEX: 28.06 KG/M2 | TEMPERATURE: 98.3 F

## 2019-10-13 DIAGNOSIS — F43.0 ACUTE REACTION TO STRESS: ICD-10-CM

## 2019-10-13 DIAGNOSIS — F32.A DEPRESSION, UNSPECIFIED DEPRESSION TYPE: ICD-10-CM

## 2019-10-13 DIAGNOSIS — F10.929 ALCOHOLIC INTOXICATION WITH COMPLICATION (H): ICD-10-CM

## 2019-10-13 DIAGNOSIS — F41.9 ANXIETY: ICD-10-CM

## 2019-10-13 LAB
ALBUMIN SERPL-MCNC: 3.9 G/DL (ref 3.4–5)
ALBUMIN UR-MCNC: 100 MG/DL
ALP SERPL-CCNC: 82 U/L (ref 40–150)
ALT SERPL W P-5'-P-CCNC: 36 U/L (ref 0–50)
AMPHETAMINES UR QL SCN: NEGATIVE
ANION GAP SERPL CALCULATED.3IONS-SCNC: 11 MMOL/L (ref 3–14)
APAP SERPL-MCNC: <2 MG/L (ref 10–20)
APPEARANCE UR: ABNORMAL
AST SERPL W P-5'-P-CCNC: 30 U/L (ref 0–45)
BACTERIA #/AREA URNS HPF: ABNORMAL /HPF
BARBITURATES UR QL: NEGATIVE
BASOPHILS # BLD AUTO: 0.1 10E9/L (ref 0–0.2)
BASOPHILS NFR BLD AUTO: 0.8 %
BENZODIAZ UR QL: NEGATIVE
BILIRUB SERPL-MCNC: 0.3 MG/DL (ref 0.2–1.3)
BILIRUB UR QL STRIP: NEGATIVE
BUN SERPL-MCNC: 9 MG/DL (ref 7–30)
CALCIUM SERPL-MCNC: 8.2 MG/DL (ref 8.5–10.1)
CANNABINOIDS UR QL SCN: NEGATIVE
CHLORIDE SERPL-SCNC: 106 MMOL/L (ref 94–109)
CO2 SERPL-SCNC: 23 MMOL/L (ref 20–32)
COCAINE UR QL: NEGATIVE
COLOR UR AUTO: YELLOW
CREAT SERPL-MCNC: 0.52 MG/DL (ref 0.52–1.04)
DIFFERENTIAL METHOD BLD: ABNORMAL
EOSINOPHIL # BLD AUTO: 0.1 10E9/L (ref 0–0.7)
EOSINOPHIL NFR BLD AUTO: 0.9 %
ERYTHROCYTE [DISTWIDTH] IN BLOOD BY AUTOMATED COUNT: 13.2 % (ref 10–15)
ETHANOL SERPL-MCNC: 0.26 G/DL
GFR SERPL CREATININE-BSD FRML MDRD: >90 ML/MIN/{1.73_M2}
GLUCOSE SERPL-MCNC: 104 MG/DL (ref 70–99)
GLUCOSE UR STRIP-MCNC: NEGATIVE MG/DL
HCG UR QL: NEGATIVE
HCT VFR BLD AUTO: 42.3 % (ref 35–47)
HGB BLD-MCNC: 14.4 G/DL (ref 11.7–15.7)
HGB UR QL STRIP: NEGATIVE
IMM GRANULOCYTES # BLD: 0 10E9/L (ref 0–0.4)
IMM GRANULOCYTES NFR BLD: 0.2 %
KETONES UR STRIP-MCNC: 5 MG/DL
LEUKOCYTE ESTERASE UR QL STRIP: ABNORMAL
LYMPHOCYTES # BLD AUTO: 4.3 10E9/L (ref 0.8–5.3)
LYMPHOCYTES NFR BLD AUTO: 43.6 %
MCH RBC QN AUTO: 31.7 PG (ref 26.5–33)
MCHC RBC AUTO-ENTMCNC: 34 G/DL (ref 31.5–36.5)
MCV RBC AUTO: 93 FL (ref 78–100)
MONOCYTES # BLD AUTO: 0.8 10E9/L (ref 0–1.3)
MONOCYTES NFR BLD AUTO: 7.9 %
MUCOUS THREADS #/AREA URNS LPF: PRESENT /LPF
NEUTROPHILS # BLD AUTO: 4.6 10E9/L (ref 1.6–8.3)
NEUTROPHILS NFR BLD AUTO: 46.6 %
NITRATE UR QL: NEGATIVE
NRBC # BLD AUTO: 0 10*3/UL
NRBC BLD AUTO-RTO: 0 /100
OPIATES UR QL SCN: NEGATIVE
PCP UR QL SCN: NEGATIVE
PH UR STRIP: 6 PH (ref 5–7)
PLATELET # BLD AUTO: 570 10E9/L (ref 150–450)
POTASSIUM SERPL-SCNC: 3.5 MMOL/L (ref 3.4–5.3)
PROT SERPL-MCNC: 7.9 G/DL (ref 6.8–8.8)
RBC # BLD AUTO: 4.54 10E12/L (ref 3.8–5.2)
RBC #/AREA URNS AUTO: 1 /HPF (ref 0–2)
SALICYLATES SERPL-MCNC: <2 MG/DL
SODIUM SERPL-SCNC: 140 MMOL/L (ref 133–144)
SOURCE: ABNORMAL
SP GR UR STRIP: 1.02 (ref 1–1.03)
SQUAMOUS #/AREA URNS AUTO: 12 /HPF (ref 0–1)
TSH SERPL DL<=0.005 MIU/L-ACNC: 3.26 MU/L (ref 0.4–4)
UROBILINOGEN UR STRIP-MCNC: 0 MG/DL (ref 0–2)
WBC # BLD AUTO: 10 10E9/L (ref 4–11)
WBC #/AREA URNS AUTO: 16 /HPF (ref 0–5)

## 2019-10-13 PROCEDURE — 80053 COMPREHEN METABOLIC PANEL: CPT | Performed by: EMERGENCY MEDICINE

## 2019-10-13 PROCEDURE — 81001 URINALYSIS AUTO W/SCOPE: CPT | Mod: XU | Performed by: EMERGENCY MEDICINE

## 2019-10-13 PROCEDURE — 25000132 ZZH RX MED GY IP 250 OP 250 PS 637: Performed by: EMERGENCY MEDICINE

## 2019-10-13 PROCEDURE — 99285 EMERGENCY DEPT VISIT HI MDM: CPT | Mod: 25

## 2019-10-13 PROCEDURE — 80307 DRUG TEST PRSMV CHEM ANLYZR: CPT | Performed by: EMERGENCY MEDICINE

## 2019-10-13 PROCEDURE — 87086 URINE CULTURE/COLONY COUNT: CPT | Performed by: EMERGENCY MEDICINE

## 2019-10-13 PROCEDURE — 80329 ANALGESICS NON-OPIOID 1 OR 2: CPT | Performed by: EMERGENCY MEDICINE

## 2019-10-13 PROCEDURE — 99285 EMERGENCY DEPT VISIT HI MDM: CPT | Mod: Z6 | Performed by: EMERGENCY MEDICINE

## 2019-10-13 PROCEDURE — 81025 URINE PREGNANCY TEST: CPT | Performed by: EMERGENCY MEDICINE

## 2019-10-13 PROCEDURE — 84443 ASSAY THYROID STIM HORMONE: CPT | Performed by: EMERGENCY MEDICINE

## 2019-10-13 PROCEDURE — 80320 DRUG SCREEN QUANTALCOHOLS: CPT | Performed by: EMERGENCY MEDICINE

## 2019-10-13 PROCEDURE — 90791 PSYCH DIAGNOSTIC EVALUATION: CPT

## 2019-10-13 PROCEDURE — 85025 COMPLETE CBC W/AUTO DIFF WBC: CPT | Performed by: EMERGENCY MEDICINE

## 2019-10-13 PROCEDURE — 25000131 ZZH RX MED GY IP 250 OP 636 PS 637: Performed by: EMERGENCY MEDICINE

## 2019-10-13 RX ORDER — CLONIDINE HYDROCHLORIDE 0.1 MG/1
0.1 TABLET ORAL ONCE
Status: COMPLETED | OUTPATIENT
Start: 2019-10-13 | End: 2019-10-13

## 2019-10-13 RX ORDER — LORAZEPAM 0.5 MG/1
0.5 TABLET ORAL ONCE
Status: COMPLETED | OUTPATIENT
Start: 2019-10-13 | End: 2019-10-13

## 2019-10-13 RX ORDER — LORAZEPAM 1 MG/1
1 TABLET ORAL ONCE
Status: COMPLETED | OUTPATIENT
Start: 2019-10-13 | End: 2019-10-13

## 2019-10-13 RX ORDER — ACETAMINOPHEN 325 MG/1
650 TABLET ORAL ONCE
Status: COMPLETED | OUTPATIENT
Start: 2019-10-13 | End: 2019-10-13

## 2019-10-13 RX ORDER — ONDANSETRON 4 MG/1
4 TABLET, ORALLY DISINTEGRATING ORAL ONCE
Status: COMPLETED | OUTPATIENT
Start: 2019-10-13 | End: 2019-10-13

## 2019-10-13 RX ADMIN — ONDANSETRON 4 MG: 4 TABLET, ORALLY DISINTEGRATING ORAL at 19:34

## 2019-10-13 RX ADMIN — ACETAMINOPHEN 650 MG: 325 TABLET, FILM COATED ORAL at 18:31

## 2019-10-13 RX ADMIN — CLONIDINE HYDROCHLORIDE 0.1 MG: 0.1 TABLET ORAL at 20:16

## 2019-10-13 RX ADMIN — LORAZEPAM 0.5 MG: 0.5 TABLET ORAL at 20:16

## 2019-10-13 RX ADMIN — LORAZEPAM 1 MG: 1 TABLET ORAL at 16:07

## 2019-10-13 ASSESSMENT — ENCOUNTER SYMPTOMS
FEVER: 0
SHORTNESS OF BREATH: 0
ABDOMINAL PAIN: 0

## 2019-10-13 NOTE — ACP (ADVANCE CARE PLANNING)
Up to bathroom did not get urine sample, pt did not want to go back to her room slightly belligerent,  but was easily directed back, family at bedside
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
## 2019-10-14 ENCOUNTER — HOSPITAL ENCOUNTER (INPATIENT)
Facility: HOSPITAL | Age: 29
LOS: 16 days | Discharge: HOME | DRG: 885 | End: 2019-10-30
Attending: PSYCHIATRY & NEUROLOGY | Admitting: PSYCHIATRY & NEUROLOGY
Payer: COMMERCIAL

## 2019-10-14 ENCOUNTER — RECORDS - HEALTHEAST (OUTPATIENT)
Dept: ADMINISTRATIVE | Facility: OTHER | Age: 29
End: 2019-10-14

## 2019-10-14 DIAGNOSIS — F33.1 MODERATE EPISODE OF RECURRENT MAJOR DEPRESSIVE DISORDER (H): Primary | ICD-10-CM

## 2019-10-14 PROBLEM — R45.851 SUICIDAL IDEATION: Status: ACTIVE | Noted: 2019-10-14

## 2019-10-14 LAB
BACTERIA SPEC CULT: NORMAL
Lab: NORMAL
SPECIMEN SOURCE: NORMAL

## 2019-10-14 PROCEDURE — 25000132 ZZH RX MED GY IP 250 OP 250 PS 637: Performed by: NURSE PRACTITIONER

## 2019-10-14 PROCEDURE — 99223 1ST HOSP IP/OBS HIGH 75: CPT | Performed by: NURSE PRACTITIONER

## 2019-10-14 PROCEDURE — 12400000 ZZH R&B MH

## 2019-10-14 RX ORDER — MULTIPLE VITAMINS W/ MINERALS TAB 9MG-400MCG
1 TAB ORAL DAILY
Status: DISCONTINUED | OUTPATIENT
Start: 2019-10-14 | End: 2019-10-30 | Stop reason: HOSPADM

## 2019-10-14 RX ORDER — TRAZODONE HYDROCHLORIDE 50 MG/1
50 TABLET, FILM COATED ORAL
Status: DISCONTINUED | OUTPATIENT
Start: 2019-10-14 | End: 2019-10-30 | Stop reason: HOSPADM

## 2019-10-14 RX ORDER — OLANZAPINE 10 MG/2ML
10 INJECTION, POWDER, FOR SOLUTION INTRAMUSCULAR EVERY 8 HOURS PRN
Status: DISCONTINUED | OUTPATIENT
Start: 2019-10-14 | End: 2019-10-30 | Stop reason: HOSPADM

## 2019-10-14 RX ORDER — FOLIC ACID 1 MG/1
1 TABLET ORAL DAILY
Status: DISCONTINUED | OUTPATIENT
Start: 2019-10-14 | End: 2019-10-30 | Stop reason: HOSPADM

## 2019-10-14 RX ORDER — CLONIDINE HYDROCHLORIDE 0.1 MG/1
0.1 TABLET ORAL 2 TIMES DAILY
Status: DISCONTINUED | OUTPATIENT
Start: 2019-10-14 | End: 2019-10-25

## 2019-10-14 RX ORDER — HYDROXYZINE HYDROCHLORIDE 25 MG/1
25-50 TABLET, FILM COATED ORAL EVERY 4 HOURS PRN
Status: DISCONTINUED | OUTPATIENT
Start: 2019-10-14 | End: 2019-10-30 | Stop reason: HOSPADM

## 2019-10-14 RX ORDER — FLUOXETINE 40 MG/1
40 CAPSULE ORAL DAILY
Status: DISCONTINUED | OUTPATIENT
Start: 2019-10-14 | End: 2019-10-24

## 2019-10-14 RX ORDER — OLANZAPINE 10 MG/1
10 TABLET ORAL EVERY 8 HOURS PRN
Status: DISCONTINUED | OUTPATIENT
Start: 2019-10-14 | End: 2019-10-30 | Stop reason: HOSPADM

## 2019-10-14 RX ORDER — DIAZEPAM 5 MG
10 TABLET ORAL EVERY 30 MIN PRN
Status: DISCONTINUED | OUTPATIENT
Start: 2019-10-14 | End: 2019-10-25

## 2019-10-14 RX ORDER — NORGESTIMATE AND ETHINYL ESTRADIOL 7DAYSX3 28
1 KIT ORAL DAILY
Status: DISCONTINUED | OUTPATIENT
Start: 2019-10-14 | End: 2019-10-14

## 2019-10-14 RX ORDER — NORGESTIMATE AND ETHINYL ESTRADIOL 7DAYSX3 28
1 KIT ORAL DAILY
Status: DISCONTINUED | OUTPATIENT
Start: 2019-10-14 | End: 2019-10-14 | Stop reason: RX

## 2019-10-14 RX ORDER — ACETAMINOPHEN 325 MG/1
650 TABLET ORAL EVERY 4 HOURS PRN
Status: DISCONTINUED | OUTPATIENT
Start: 2019-10-14 | End: 2019-10-30 | Stop reason: HOSPADM

## 2019-10-14 RX ORDER — LORAZEPAM 1 MG/1
1-2 TABLET ORAL EVERY 30 MIN PRN
Status: DISCONTINUED | OUTPATIENT
Start: 2019-10-14 | End: 2019-10-14

## 2019-10-14 RX ADMIN — LORAZEPAM 1 MG: 1 TABLET ORAL at 01:12

## 2019-10-14 RX ADMIN — MULTIPLE VITAMINS W/ MINERALS TAB 1 TABLET: TAB at 08:29

## 2019-10-14 RX ADMIN — DIAZEPAM 10 MG: 5 TABLET ORAL at 14:58

## 2019-10-14 RX ADMIN — FLUOXETINE HYDROCHLORIDE 40 MG: 40 CAPSULE ORAL at 11:55

## 2019-10-14 RX ADMIN — LORAZEPAM 1 MG: 1 TABLET ORAL at 08:37

## 2019-10-14 RX ADMIN — FOLIC ACID 1 MG: 1 TABLET ORAL at 08:29

## 2019-10-14 RX ADMIN — CLONIDINE HYDROCHLORIDE 0.1 MG: 0.1 TABLET ORAL at 11:55

## 2019-10-14 RX ADMIN — Medication 100 MG: at 08:29

## 2019-10-14 RX ADMIN — TRAZODONE HYDROCHLORIDE 50 MG: 50 TABLET ORAL at 20:10

## 2019-10-14 RX ADMIN — LORAZEPAM 1 MG: 1 TABLET ORAL at 10:47

## 2019-10-14 RX ADMIN — CLONIDINE HYDROCHLORIDE 0.1 MG: 0.1 TABLET ORAL at 20:10

## 2019-10-14 ASSESSMENT — ACTIVITIES OF DAILY LIVING (ADL)
HYGIENE/GROOMING: INDEPENDENT
RETIRED_COMMUNICATION: 0-->UNDERSTANDS/COMMUNICATES WITHOUT DIFFICULTY
HYGIENE/GROOMING: INDEPENDENT
FALL_HISTORY_WITHIN_LAST_SIX_MONTHS: NO
TOILETING: 0-->INDEPENDENT
ORAL_HYGIENE: INDEPENDENT
DRESS: SCRUBS (BEHAVIORAL HEALTH);INDEPENDENT
LAUNDRY: UNABLE TO COMPLETE
BATHING: 0-->INDEPENDENT
TRANSFERRING: 0-->INDEPENDENT
LAUNDRY: UNABLE TO COMPLETE
DRESS: INDEPENDENT
ORAL_HYGIENE: INDEPENDENT
DRESS: SCRUBS (BEHAVIORAL HEALTH);INDEPENDENT
COGNITION: 0 - NO COGNITION ISSUES REPORTED
HYGIENE/GROOMING: INDEPENDENT
SWALLOWING: 0-->SWALLOWS FOODS/LIQUIDS WITHOUT DIFFICULTY
DRESS: 0-->INDEPENDENT
RETIRED_EATING: 0-->INDEPENDENT
AMBULATION: 0-->INDEPENDENT
ORAL_HYGIENE: INDEPENDENT

## 2019-10-14 ASSESSMENT — MIFFLIN-ST. JEOR: SCORE: 1659.83

## 2019-10-14 NOTE — PROGRESS NOTES
10/14/19 0100   Patient Belongings   Did you bring any home meds/supplements to the hospital?  Yes   Disposition of meds  Sent to security/pharmacy per site process   Patient Belongings remains with patient  (tank pants panties pruse sandles pens lighter personal four dollars and forty cents,meli,metal dog oil lighter browpen )   Patient Belongings Remaining with Patient clothing   Belongings Search Yes   Clothing Search Yes   Second Staff aimee   Comment n/a   List items sent to safe: cellphone an case checkbook mnd.l. stundent id 4 ins cards [facility] citi card blood donor card 3 gift cards 2 discover cards prime card torrid insidercard cub card aveda card pet card silver locket  All other belongings put in assigned cubby in belongings room.       I have reviewed my belongings list on admission and verify that it is correct.     Patient signature_______________________________    Second staff witness (if patient unable to sign) ______________________________       I have received all my belongings at discharge.    Patient signature________________________________    Katherine Metzger CNA  10/14/2019  1:05 AM

## 2019-10-14 NOTE — PLAN OF CARE
Face to face shift report received from Mary FULTON RN. Rounding completed, pt observed.  Edna Trujillo RN  10/14/2019  7:51 AM  Problem: Adult Behavioral Health Plan of Care  Goal: Patient-Specific Goal (Individualization)  Description  Pt will follow recommendations of treatment team.  Pt will be compliant with medications.  Pt will sleep 6-8 hours each nights.  Pt will attend 50 % of groups.     10/14/2019 0749 by Edna Trujillo RN  Outcome: No Change  Patient was awake in bed at the start of this shift.  Patient states she does not really remember all that took place or all the things she had said prior to her admission here.  Patient does admit to drinking heavily for 7 days then will refrain for 7 days.  Patient admits to depression but currently denies suicidal ideation or intent.  Patient did not sleep well.  Spoke to patient's mother who is on the SORIN and updated her on patient's condition.  No group attendance as not feeling well enough to leave her room for long periods.  PTA meds verified over the phone with pharmacist at [facility] in [place] but they would not fax written confirmation without a signed SORIN.     Problem: Substance Withdrawal  Goal: Substance Withdrawal  Description  Pt will safely withdraw from alcohol during hospitalization.   10/14/2019 0749 by Edna Trujillo RN  Outcome: Improving  Patient is nauseated and tremulous this AM. BP and HR elevated.   Patient scored 10 on CIWA-Ar at start of shift and at 1045.  Ativan 1 mg po administered both times.  Patient encouraged to take in fluids if not able to eat at this time.  1458:  Scores 9 on CIWA-Ar; administered Valium 10 mg po.   Face to face end of shift report will be communicated to oncoming shift.
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
## 2019-10-14 NOTE — LETTER
[address] [address]  Phone: [phone]  Fax: [phone]    10/30/19    Abimbola Monreal  [address] [place] [address]        To whom it may concern:         Abimbola Monreal ( [birth year]) was hospitalized at [facility] in [place], [place] as a patient under my care from 10/14/19-10/30/19. She may return to work with no restrictions.        Sincerely,        OBIE Chauhan, CNP
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
## 2019-10-14 NOTE — ED PROVIDER NOTES
Emergency Department Psychiatric Patient Sign-out       Brief HPI:  This is a 29 year old female signed out to me by Dr. Garcia .  See initial ED Provider note for details of the presentation.     Patient is medically cleared for admission to a Behavioral Health unit.        The patient is on a hold.  The type of hold is Transportation.    The patient has required medication for agitation.    Medications   LORazepam (ATIVAN) tablet 1 mg (1 mg Oral Given 10/13/19 1607)   acetaminophen (TYLENOL) tablet 650 mg (650 mg Oral Given 10/13/19 1831)   ondansetron (ZOFRAN-ODT) ODT tab 4 mg (4 mg Oral Given 10/13/19 1934)       Exam:   Patient Vitals for the past 24 hrs:   BP Temp Temp src Pulse Resp SpO2 Weight   10/13/19 1600 (!) 148/127 -- -- 140 -- 100 % --   10/13/19 1445 (!) 152/104 -- -- 122 -- 100 % --   10/13/19 1410 (!) 150/117 -- -- 137 -- 100 % --   10/13/19 1350 (!) 167/115 -- -- 143 -- 97 % --   10/13/19 1313 -- -- -- -- -- 96 % --   10/13/19 1311 -- 98.3  F (36.8  C) Temporal 165 18 -- 86.2 kg (190 lb)         ED Course:     patient was allowed to sober up and after several hours a DEC assessment was obtained.  The DEC  discussed the case with patient and family members and it was determined would be best to admit the patient for further evaluation and care.  Patient was reluctant about this but excepting.  She was observed until a bed became available at [place] and she was transported in stable condition.  She did receive 1 dose of oral Ativan while I was observing her.      Impression:    ICD-10-CM    1. Alcoholic intoxication with complication (H) F10.929 Drug abuse screen 77 urine ([place])     UA reflex to Microscopic and Culture     HCG qualitative urine     Urine Culture Aerobic Bacterial     Urine Culture Aerobic Bacterial   2. Depression, unspecified depression type F32.9    3. Anxiety F41.9    4. Acute reaction to stress F43.0        Plan:    1.  Patient was transferred to [place]  [facility] for further evaluation and care.      RESULTS:   Results for orders placed or performed during the hospital encounter of 10/13/19 (from the past 24 hour(s))   CBC with platelets differential     Status: Abnormal    Collection Time: 10/13/19  1:52 PM   Result Value Ref Range    WBC 10.0 4.0 - 11.0 10e9/L    RBC Count 4.54 3.8 - 5.2 10e12/L    Hemoglobin 14.4 11.7 - 15.7 g/dL    Hematocrit 42.3 35.0 - 47.0 %    MCV 93 78 - 100 fl    MCH 31.7 26.5 - 33.0 pg    MCHC 34.0 31.5 - 36.5 g/dL    RDW 13.2 10.0 - 15.0 %    Platelet Count 570 (H) 150 - 450 10e9/L    Diff Method Automated Method     % Neutrophils 46.6 %    % Lymphocytes 43.6 %    % Monocytes 7.9 %    % Eosinophils 0.9 %    % Basophils 0.8 %    % Immature Granulocytes 0.2 %    Nucleated RBCs 0 0 /100    Absolute Neutrophil 4.6 1.6 - 8.3 10e9/L    Absolute Lymphocytes 4.3 0.8 - 5.3 10e9/L    Absolute Monocytes 0.8 0.0 - 1.3 10e9/L    Absolute Eosinophils 0.1 0.0 - 0.7 10e9/L    Absolute Basophils 0.1 0.0 - 0.2 10e9/L    Abs Immature Granulocytes 0.0 0 - 0.4 10e9/L    Absolute Nucleated RBC 0.0    Comprehensive metabolic panel     Status: Abnormal    Collection Time: 10/13/19  1:52 PM   Result Value Ref Range    Sodium 140 133 - 144 mmol/L    Potassium 3.5 3.4 - 5.3 mmol/L    Chloride 106 94 - 109 mmol/L    Carbon Dioxide 23 20 - 32 mmol/L    Anion Gap 11 3 - 14 mmol/L    Glucose 104 (H) 70 - 99 mg/dL    Urea Nitrogen 9 7 - 30 mg/dL    Creatinine 0.52 0.52 - 1.04 mg/dL    GFR Estimate >90 >60 mL/min/[1.73_m2]    GFR Estimate If Black >90 >60 mL/min/[1.73_m2]    Calcium 8.2 (L) 8.5 - 10.1 mg/dL    Bilirubin Total 0.3 0.2 - 1.3 mg/dL    Albumin 3.9 3.4 - 5.0 g/dL    Protein Total 7.9 6.8 - 8.8 g/dL    Alkaline Phosphatase 82 40 - 150 U/L    ALT 36 0 - 50 U/L    AST 30 0 - 45 U/L   Salicylate level     Status: None    Collection Time: 10/13/19  1:52 PM   Result Value Ref Range    Salicylate Level <2 mg/dL   Acetaminophen level     Status: None    Collection  Time: 10/13/19  1:52 PM   Result Value Ref Range    Acetaminophen Level <2 mg/L   Alcohol ethyl     Status: Abnormal    Collection Time: 10/13/19  1:52 PM   Result Value Ref Range    Ethanol g/dL 0.26 (H) <0.01 g/dL   TSH     Status: None    Collection Time: 10/13/19  1:52 PM   Result Value Ref Range    TSH 3.26 0.40 - 4.00 mU/L   Drug abuse screen 77 urine ([place],SH)     Status: None    Collection Time: 10/13/19  4:22 PM   Result Value Ref Range    Amphetamine Qual Urine Negative NEG^Negative    Barbiturates Qual Urine Negative NEG^Negative    Benzodiazepine Qual Urine Negative NEG^Negative    Cannabinoids Qual Urine Negative NEG^Negative    Cocaine Qual Urine Negative NEG^Negative    Opiates Qualitative Urine Negative NEG^Negative    PCP Qual Urine Negative NEG^Negative   UA reflex to Microscopic and Culture     Status: Abnormal    Collection Time: 10/13/19  4:22 PM   Result Value Ref Range    Color Urine Yellow     Appearance Urine Slightly Cloudy     Glucose Urine Negative NEG^Negative mg/dL    Bilirubin Urine Negative NEG^Negative    Ketones Urine 5 (A) NEG^Negative mg/dL    Specific Gravity Urine 1.021 1.003 - 1.035    Blood Urine Negative NEG^Negative    pH Urine 6.0 5.0 - 7.0 pH    Protein Albumin Urine 100 (A) NEG^Negative mg/dL    Urobilinogen mg/dL 0.0 0.0 - 2.0 mg/dL    Nitrite Urine Negative NEG^Negative    Leukocyte Esterase Urine Moderate (A) NEG^Negative    Source Midstream Urine     RBC Urine 1 0 - 2 /HPF    WBC Urine 16 (H) 0 - 5 /HPF    Bacteria Urine Few (A) NEG^Negative /HPF    Squamous Epithelial /HPF Urine 12 (H) 0 - 1 /HPF    Mucous Urine Present (A) NEG^Negative /LPF   HCG qualitative urine     Status: None    Collection Time: 10/13/19  4:22 PM   Result Value Ref Range    HCG Qual Urine Negative NEG^Negative         MD Bryanna Colindres, Edward Crawford MD  10/15/19 0984

## 2019-10-14 NOTE — PLAN OF CARE
BEHAVIORAL TEAM DISCUSSION    Participants:  Sania Pinto NP,   Kathy Quevedo LSW,  Odalis Ray LSW, Gatito Noguera LSW, Mirlande Cormier OT, Sania Hand OT, Alex Tijerina [facility]   Progress: New pt  Continued Stay Criteria/Rationale: Admitted with SI  Medical/Physical: CIWA  Precautions:   Falls precaution?: None   Behavioral Orders   Procedures    Code 1 - Restrict to Unit    Routine Programming     As clinically indicated    Status 15     Every 15 minutes.     Plan: NP and SW to assess   Rationale for change in precautions or plan:None     Active Problems:    Suicidal ideation      Current Facility-Administered Medications:     acetaminophen (TYLENOL) tablet 650 mg, 650 mg, Oral, Q4H PRN, Sania Pinto APRN CNP    folic acid (FOLVITE) tablet 1 mg, 1 mg, Oral, Daily, Sania Pinto APRN CNP, 1 mg at 10/14/19 0829    hydrOXYzine (ATARAX) tablet 25-50 mg, 25-50 mg, Oral, Q4H PRN, Sania Pinto APRN CNP    LORazepam (ATIVAN) tablet 1-2 mg, 1-2 mg, Oral, Q30 Min PRN, Sania Pinto APRN CNP, 1 mg at 10/14/19 0837    magnesium hydroxide (MILK OF MAGNESIA) suspension 30 mL, 30 mL, Oral, At Bedtime PRN, Sania Pinto APRN CNP    multivitamin w/minerals (THERA-VIT-M) tablet 1 tablet, 1 tablet, Oral, Daily, Sania Pinto APRN CNP, 1 tablet at 10/14/19 0829    nicotine (NICORETTE) gum 2-4 mg, 2-4 mg, Buccal, Q1H PRN, Sania Pinto APRN CNP    OLANZapine (zyPREXA) tablet 10 mg, 10 mg, Oral, Q8H PRN **OR** OLANZapine (zyPREXA) injection 10 mg, 10 mg, Intramuscular, Q8H PRN, Sania Pinto APRN CNP    thiamine tablet 100 mg, 100 mg, Oral, Daily, Sania Pinto APRN CNP, 100 mg at 10/14/19 0829    traZODone (DESYREL) tablet 50 mg, 50 mg, Oral, At Bedtime PRN, Sania Pinto APRN CNP

## 2019-10-15 PROCEDURE — 25000131 ZZH RX MED GY IP 250 OP 636 PS 637: Performed by: NURSE PRACTITIONER

## 2019-10-15 PROCEDURE — 25000132 ZZH RX MED GY IP 250 OP 250 PS 637: Performed by: NURSE PRACTITIONER

## 2019-10-15 PROCEDURE — 12400000 ZZH R&B MH

## 2019-10-15 PROCEDURE — 99232 SBSQ HOSP IP/OBS MODERATE 35: CPT | Performed by: NURSE PRACTITIONER

## 2019-10-15 RX ORDER — ONDANSETRON 4 MG/1
4 TABLET, FILM COATED ORAL EVERY 6 HOURS PRN
Status: DISCONTINUED | OUTPATIENT
Start: 2019-10-15 | End: 2019-10-30 | Stop reason: HOSPADM

## 2019-10-15 RX ADMIN — FOLIC ACID 1 MG: 1 TABLET ORAL at 09:05

## 2019-10-15 RX ADMIN — DIAZEPAM 10 MG: 5 TABLET ORAL at 00:31

## 2019-10-15 RX ADMIN — Medication 100 MG: at 09:05

## 2019-10-15 RX ADMIN — FLUOXETINE HYDROCHLORIDE 40 MG: 40 CAPSULE ORAL at 09:05

## 2019-10-15 RX ADMIN — ONDANSETRON HYDROCHLORIDE 4 MG: 4 TABLET, FILM COATED ORAL at 12:54

## 2019-10-15 RX ADMIN — CLONIDINE HYDROCHLORIDE 0.1 MG: 0.1 TABLET ORAL at 09:05

## 2019-10-15 RX ADMIN — CLONIDINE HYDROCHLORIDE 0.1 MG: 0.1 TABLET ORAL at 21:23

## 2019-10-15 RX ADMIN — MULTIPLE VITAMINS W/ MINERALS TAB 1 TABLET: TAB at 09:05

## 2019-10-15 ASSESSMENT — ACTIVITIES OF DAILY LIVING (ADL)
LAUNDRY: UNABLE TO COMPLETE
DRESS: SCRUBS (BEHAVIORAL HEALTH)
HYGIENE/GROOMING: INDEPENDENT
ORAL_HYGIENE: INDEPENDENT

## 2019-10-15 NOTE — PLAN OF CARE
Problem: Adult Behavioral Health Plan of Care  Goal: Patient-Specific Goal (Individualization)  Description  Pt will follow recommendations of treatment team.  Pt will be compliant with medications.  Pt will sleep 6-8 hours each nights.  Pt will attend 50 % of groups.     Outcome: Improving  Note:          Problem: Adult Behavioral Health Plan of Care  Goal: Adheres to Safety Considerations for Self and Others  Outcome: Improving     Problem: Adult Behavioral Health Plan of Care  Goal: Optimized Coping Skills in Response to Life Stressors  Outcome: Improving     Problem: Substance Withdrawal  Goal: Substance Withdrawal  Description  Pt will safely withdraw from alcohol during hospitalization.   Outcome: Improving     Patient is seen in her room and she is asleep at the start of the shift. Patient does rise to verbal stimuli and is pleasant. Patient states she is finally feeling better, but not completely. Patient denies all criteria, with exception of sweating, she states she is not feeling nauseated at this time, but does not want to chance it by eating too much of the solid foods. Patient does have some crackers and juice as well as water, and does well and keeps this down. Patients vitals have improved t/o the shift, and patient does get up and walk the halls, and makes a phone call. Patient is pleasant and cooperative, but does not do much talking or give much insight at this time. No SI. HI, or hallucinations reported, some depression is reported.     End of shift hand off report is to be given to oncoming RN

## 2019-10-15 NOTE — PROGRESS NOTES
[facility]  Psychiatric Progress Note      Impression:   (per ED/Admission) pt is a 29 yr old female with a hx of MH. Per  pt has been having an increase in MH concerns. Pt's  has left her due to her increase in drinking. Pt reports drinking 2-2.5 bottles of wine per day for the last 5 years. KENROY upon arrival in ED is 0.26 but pt interviewed clinically sober.  Pt is having an increase in anxiety and depression. Pt stated that to the nurse in the ED that she would lie and state that she was not suicidal to not be admitted to [facility]. Pt states to  that she is not suicidal but per family who is in the ED with the pt, the pt has made multiple statements about not wanting to be alive.Pt states that she is hopeless and has been cycling between binge eating and not eating. Pt states she is not sleeping well. Pt states she does not have positive coping skills and that she is unable to regulate her mood. 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 Pt receiving Ativan in the ED. No hx of withdrawal seizures or DTs. Pt is medically cleared and ambulating. Nausea, shaky and anxious. Pt has slight increase in BP and HR.      Patient lying in her bed, reports her anxiety is much better, however endorses continued depression and continues to go through withdrawals.  She is hoping that she wont need to be here much longer, although does agree to stay until she feels ready to go, a day or two she thinks.  Patient ate just a bit of breakfast, ordered a little more for lunch and agrees to prn Zofran if nauseous.  We also explore medication for depression, patient would like to wait and work with her PCP.  We did add clonidine and her Bps have come down in normal range today.  Patient denies suicidal thoughts and reports she has some hope.  She becomes tearful though in thinking she just does not feel good today.  Remind her her goal and focus here is on self care and overcome the  withdrawals so that she will feel more equipped to tackle the changes she needs.  She has talked to her  a couple times and her mom once since admission.           DIagnoses:   Major Depressive Disorder, recurrent, severe without psychosis  Alcohol Abuse Disorder             Plan:     Continue CIWA    ELOS:  1-3 days for stabilization and diminish withdrawals      Attestation:  Patient has been seen and evaluated by me,  Sania Pinto, OBIE PA          Interim History:   The patient's care was discussed with the treatment team and chart notes were reviewed.          Medications:     Current Facility-Administered Medications Ordered in Epic   Medication Dose Route Frequency Last Rate Last Dose     acetaminophen (TYLENOL) tablet 650 mg  650 mg Oral Q4H PRN         cloNIDine (CATAPRES) tablet 0.1 mg  0.1 mg Oral BID   0.1 mg at 10/14/19 2010     diazepam (VALIUM) tablet 10 mg  10 mg Oral Q30 Min PRN   10 mg at 10/15/19 0031     FLUoxetine (PROzac) capsule 40 mg  40 mg Oral Daily   40 mg at 10/14/19 1155     folic acid (FOLVITE) tablet 1 mg  1 mg Oral Daily   1 mg at 10/14/19 0829     hydrOXYzine (ATARAX) tablet 25-50 mg  25-50 mg Oral Q4H PRN         magnesium hydroxide (MILK OF MAGNESIA) suspension 30 mL  30 mL Oral At Bedtime PRN         melatonin 3 mg (with vit B6 10 mg) extended release tablet 1 tablet  1 tablet Oral At Bedtime PRN         multivitamin w/minerals (THERA-VIT-M) tablet 1 tablet  1 tablet Oral Daily   1 tablet at 10/14/19 0829     nicotine (NICORETTE) gum 2-4 mg  2-4 mg Buccal Q1H PRN         OLANZapine (zyPREXA) tablet 10 mg  10 mg Oral Q8H PRN        Or     OLANZapine (zyPREXA) injection 10 mg  10 mg Intramuscular Q8H PRN         thiamine tablet 100 mg  100 mg Oral Daily   100 mg at 10/14/19 0829     traZODone (DESYREL) tablet 50 mg  50 mg Oral At Bedtime PRN   50 mg at 10/14/19 2010     No current [facility]-ordered outpatient medications on file.          10 point ROS negative         "Allergies:     Allergies   Allergen Reactions     Codeine             Psychiatric Examination:   /80   Pulse 94   Temp 97.6  F (36.4  C) (Tympanic)   Resp 16   Ht 1.753 m (5' 9\")   Wt 87 kg (191 lb 14.4 oz)   SpO2 98%   BMI 28.34 kg/m    Weight is 191 lbs 14.4 oz  Body mass index is 28.34 kg/m .    Appearance:  awake, alert  Attitude:  cooperative  Eye Contact:  good  Mood:  depressed  Affect:  mood congruent  Speech:  clear, coherent  Psychomotor Behavior:  no evidence of tardive dyskinesia, dystonia, or tics  Thought Process:  logical and linear  Associations:  no loose associations  Thought Content:  no evidence of suicidal ideation or homicidal ideation and no evidence of psychotic thought  Insight:  fair  Judgment:  fair  Oriented to:  time, person, and place  Attention Span and Concentration:  intact  Recent and Remote Memory:  intact  Fund of Knowledge: appropriate  Muscle Strength and Tone: normal  Gait and Station: Normal           Labs:     Results for orders placed or performed during the hospital encounter of 10/13/19   CBC with platelets differential   Result Value Ref Range    WBC 10.0 4.0 - 11.0 10e9/L    RBC Count 4.54 3.8 - 5.2 10e12/L    Hemoglobin 14.4 11.7 - 15.7 g/dL    Hematocrit 42.3 35.0 - 47.0 %    MCV 93 78 - 100 fl    MCH 31.7 26.5 - 33.0 pg    MCHC 34.0 31.5 - 36.5 g/dL    RDW 13.2 10.0 - 15.0 %    Platelet Count 570 (H) 150 - 450 10e9/L    Diff Method Automated Method     % Neutrophils 46.6 %    % Lymphocytes 43.6 %    % Monocytes 7.9 %    % Eosinophils 0.9 %    % Basophils 0.8 %    % Immature Granulocytes 0.2 %    Nucleated RBCs 0 0 /100    Absolute Neutrophil 4.6 1.6 - 8.3 10e9/L    Absolute Lymphocytes 4.3 0.8 - 5.3 10e9/L    Absolute Monocytes 0.8 0.0 - 1.3 10e9/L    Absolute Eosinophils 0.1 0.0 - 0.7 10e9/L    Absolute Basophils 0.1 0.0 - 0.2 10e9/L    Abs Immature Granulocytes 0.0 0 - 0.4 10e9/L    Absolute Nucleated RBC 0.0    Comprehensive metabolic panel   Result " Value Ref Range    Sodium 140 133 - 144 mmol/L    Potassium 3.5 3.4 - 5.3 mmol/L    Chloride 106 94 - 109 mmol/L    Carbon Dioxide 23 20 - 32 mmol/L    Anion Gap 11 3 - 14 mmol/L    Glucose 104 (H) 70 - 99 mg/dL    Urea Nitrogen 9 7 - 30 mg/dL    Creatinine 0.52 0.52 - 1.04 mg/dL    GFR Estimate >90 >60 mL/min/[1.73_m2]    GFR Estimate If Black >90 >60 mL/min/[1.73_m2]    Calcium 8.2 (L) 8.5 - 10.1 mg/dL    Bilirubin Total 0.3 0.2 - 1.3 mg/dL    Albumin 3.9 3.4 - 5.0 g/dL    Protein Total 7.9 6.8 - 8.8 g/dL    Alkaline Phosphatase 82 40 - 150 U/L    ALT 36 0 - 50 U/L    AST 30 0 - 45 U/L   Salicylate level   Result Value Ref Range    Salicylate Level <2 mg/dL   Acetaminophen level   Result Value Ref Range    Acetaminophen Level <2 mg/L   Alcohol ethyl   Result Value Ref Range    Ethanol g/dL 0.26 (H) <0.01 g/dL   TSH   Result Value Ref Range    TSH 3.26 0.40 - 4.00 mU/L   Drug abuse screen 77 urine ([place])   Result Value Ref Range    Amphetamine Qual Urine Negative NEG^Negative    Barbiturates Qual Urine Negative NEG^Negative    Benzodiazepine Qual Urine Negative NEG^Negative    Cannabinoids Qual Urine Negative NEG^Negative    Cocaine Qual Urine Negative NEG^Negative    Opiates Qualitative Urine Negative NEG^Negative    PCP Qual Urine Negative NEG^Negative   UA reflex to Microscopic and Culture   Result Value Ref Range    Color Urine Yellow     Appearance Urine Slightly Cloudy     Glucose Urine Negative NEG^Negative mg/dL    Bilirubin Urine Negative NEG^Negative    Ketones Urine 5 (A) NEG^Negative mg/dL    Specific Gravity Urine 1.021 1.003 - 1.035    Blood Urine Negative NEG^Negative    pH Urine 6.0 5.0 - 7.0 pH    Protein Albumin Urine 100 (A) NEG^Negative mg/dL    Urobilinogen mg/dL 0.0 0.0 - 2.0 mg/dL    Nitrite Urine Negative NEG^Negative    Leukocyte Esterase Urine Moderate (A) NEG^Negative    Source Midstream Urine     RBC Urine 1 0 - 2 /HPF    WBC Urine 16 (H) 0 - 5 /HPF    Bacteria Urine Few (A)  NEG^Negative /HPF    Squamous Epithelial /HPF Urine 12 (H) 0 - 1 /HPF    Mucous Urine Present (A) NEG^Negative /LPF   HCG qualitative urine   Result Value Ref Range    HCG Qual Urine Negative NEG^Negative   Urine Culture Aerobic Bacterial   Result Value Ref Range    Specimen Description Midstream Urine     Special Requests Specimen received in preservative     Culture Micro <10,000 colonies/mL  mixed urogenital nicholas

## 2019-10-15 NOTE — PLAN OF CARE
Face to face end of shift report received from Tamara VILLATORO RN. Rounding completed. Patient observed.     Gordon Moscoso RN  10/15/2019  12:13 AM    Problem: Adult Behavioral Health Plan of Care  Goal: Patient-Specific Goal (Individualization)  Description  Pt will follow recommendations of treatment team.  Pt will be compliant with medications.  Pt will sleep 6-8 hours each nights.  Pt will attend 50 % of groups.     10/15/2019 0012 by Gordon Moscoso RN  Outcome: No Change  Note:       0031- Pt scored an 8 on the CIWA and received 10 mg valium.     Pt appeared to be sleeping with eyes closed and respirations regular. Pt appeared to be at ease without any further signs of withdrawal. At this time.     Face to face end of shift report will be given to oncoming RN.     Gordon Moscoso RN  10/15/2019  6:31 AM

## 2019-10-15 NOTE — PLAN OF CARE
BEHAVIORAL TEAM DISCUSSION    Participants:  Sania Pinto NP, Katey Marvin LICSW, Kathy Quevedo LSW,  Odalis Ray LSW, Gatito Noguera LSW, Lamar Sykes RN, Hyacinth Villalobos RN, Regine Jones OT, Sania Hand OT, Rosaura Huang RT,   Progress: fair - pleasant and cooperative with staff  Continued Stay Criteria/Rationale: some depression, reports a lot of alcohol consumption, does not attend groups  Medical/Physical: CIWA  Precautions:   Falls precaution?: None   Behavioral Orders   Procedures     Code 1 - Restrict to Unit     Routine Programming     As clinically indicated     Status 15     Every 15 minutes.     Plan: Give options for therapy  Rationale for change in precautions or plan:None     Active Problems:    Suicidal ideation      Current Facility-Administered Medications:      acetaminophen (TYLENOL) tablet 650 mg, 650 mg, Oral, Q4H PRN, Sania Pinto APRN CNP     cloNIDine (CATAPRES) tablet 0.1 mg, 0.1 mg, Oral, BID, Sania Pinto APRN CNP, 0.1 mg at 10/14/19 2010     diazepam (VALIUM) tablet 10 mg, 10 mg, Oral, Q30 Min PRN, Sania Pinto APRN CNP, 10 mg at 10/15/19 0031     FLUoxetine (PROzac) capsule 40 mg, 40 mg, Oral, Daily, Sania Pinto APRN CNP, 40 mg at 10/14/19 1155     folic acid (FOLVITE) tablet 1 mg, 1 mg, Oral, Daily, Sania Pinto APRN CNP, 1 mg at 10/14/19 0829     hydrOXYzine (ATARAX) tablet 25-50 mg, 25-50 mg, Oral, Q4H PRN, Sania Pinto APRN CNP     magnesium hydroxide (MILK OF MAGNESIA) suspension 30 mL, 30 mL, Oral, At Bedtime PRN, Sania Pinto APRN CNP     melatonin 3 mg (with vit B6 10 mg) extended release tablet 1 tablet, 1 tablet, Oral, At Bedtime PRN, Sania Pinto APRN CNP     multivitamin w/minerals (THERA-VIT-M) tablet 1 tablet, 1 tablet, Oral, Daily, Sania Pinto APRN CNP, 1 tablet at 10/14/19 0829     nicotine (NICORETTE) gum 2-4 mg, 2-4 mg, Buccal, Q1H PRN, Sania Pinto APRN CNP      OLANZapine (zyPREXA) tablet 10 mg, 10 mg, Oral, Q8H PRN **OR** OLANZapine (zyPREXA) injection 10 mg, 10 mg, Intramuscular, Q8H PRN, Sania Pinto APRN CNP     thiamine tablet 100 mg, 100 mg, Oral, Daily, WoSania robles APRN CNP, 100 mg at 10/14/19 0829     traZODone (DESYREL) tablet 50 mg, 50 mg, Oral, At Bedtime PRN, Sania Pinto APRN CNP, 50 mg at 10/14/19 2010

## 2019-10-15 NOTE — PLAN OF CARE
Problem: Adult Behavioral Health Plan of Care  Goal: Plan of Care Review  Outcome: Improving     Problem: Substance Withdrawal  Goal: Substance Withdrawal  Description  Pt will safely withdraw from alcohol during hospitalization.   10/15/2019 1833 by Kashif Ortez RN  Outcome: Improving  1700  Pt denies nausea and ate well for supper  pt states wants to go for treatment after discharge and is hopeful that  would be open to therapy  No diaphoresis tremors or hallucinations States feels calmer and less anxious than last night  Score o on CIWA 1840 Weepy worried what the future holds with her  considering her continued alcohol use

## 2019-10-15 NOTE — PLAN OF CARE
"Face to face end of shift report received from Gordon. Rounding completed. Patient observed in room.     Lamar Sykes RN  10/15/2019  8:29 AM   Problem: Adult Behavioral Health Plan of Care  Goal: Patient-Specific Goal (Individualization)  Description  Pt will follow recommendations of treatment team.  Pt will be compliant with medications.  Pt will sleep 6-8 hours each nights.  Pt will attend 50 % of groups.     10/15/2019 0829 by Lamar Sykes, RN  Outcome: No Change  Note:     Pt isolative to room. She denies SI HI hallucinations and pain at this time.  She admits to depression 10/10 and anxiety 5/10. She is on CIWA and did not qualify for medication. She did eat breakfast and ate some toast. She stated she felt a little nausea but it went away. She also complained of poor sleep but stated that the meds last night did help some. Patient is frustrated that she is here. Stated that she is here because her family went to the house and put her in the car and brought her to the hospital. She states I am not suicidal, but do have depression.  Pt has a medication provider Dr. Dias in [place], she has been seeing her monthly unit they get her stabilized on medication.  She is searching for a therapist (needs to be a women) that is not religiously based. She is tearful at times and does want to get help, she states \" my  is devastated\" she states that they work opposite shifts so he is not aware of her alcohol use/abuse. She states that she is able to drink a box of wine in a day, she states that she drinks from sun up to sun down when she is able. 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 She hopes to discharge today.     Problem: Adult Behavioral Health Plan of Care  Goal: Adheres to Safety Considerations for Self and Others  10/15/2019 0829 by Lamar Sykes, RN  Outcome: No Change   no thoughts to harm self or others  Problem: Adult Behavioral Health Plan of Care  Goal: " Optimized Coping Skills in Response to Life Stressors  10/15/2019 0829 by Lamar Sykes, RN  Outcome: No Change   pt is encouraged to participate in unit programming  Problem: Substance Withdrawal  Goal: Substance Withdrawal  Description  Pt will safely withdraw from alcohol during hospitalization.   10/15/2019 0829 by Lamar Sykes, RN  Outcome: No Change   CIWA 0900: 2  CIWA 1200 : score of 2    zofran prn administered for complaints of Nausea. Pt resting and stated that she felt better.     end of shift report communicated to oncoming shift.  Lamar Sykes RN  10/15/2019  3:15 PM

## 2019-10-15 NOTE — PLAN OF CARE
Spoke with pt this afternoon- she was resting in bed and stated that she felt tired and kind of nauseas after lunch. States she planning on going home in the next couple of days and her  will come and get her. Talked with pt about follow up apts. Informed pt of resources that were listed and she states she would prefer to schedule her own therapy apt. Pt denies SI today and any other questions or concerns at this time.

## 2019-10-16 PROCEDURE — 12400000 ZZH R&B MH

## 2019-10-16 PROCEDURE — 25000132 ZZH RX MED GY IP 250 OP 250 PS 637: Performed by: NURSE PRACTITIONER

## 2019-10-16 PROCEDURE — 99232 SBSQ HOSP IP/OBS MODERATE 35: CPT | Performed by: NURSE PRACTITIONER

## 2019-10-16 RX ADMIN — MULTIPLE VITAMINS W/ MINERALS TAB 1 TABLET: TAB at 08:30

## 2019-10-16 RX ADMIN — FOLIC ACID 1 MG: 1 TABLET ORAL at 08:30

## 2019-10-16 RX ADMIN — CLONIDINE HYDROCHLORIDE 0.1 MG: 0.1 TABLET ORAL at 19:58

## 2019-10-16 RX ADMIN — DIAZEPAM 10 MG: 5 TABLET ORAL at 17:23

## 2019-10-16 RX ADMIN — Medication 100 MG: at 08:29

## 2019-10-16 RX ADMIN — DIAZEPAM 10 MG: 5 TABLET ORAL at 10:15

## 2019-10-16 RX ADMIN — CLONIDINE HYDROCHLORIDE 0.1 MG: 0.1 TABLET ORAL at 08:30

## 2019-10-16 RX ADMIN — FLUOXETINE HYDROCHLORIDE 40 MG: 40 CAPSULE ORAL at 08:30

## 2019-10-16 ASSESSMENT — ACTIVITIES OF DAILY LIVING (ADL)
DRESS: SCRUBS (BEHAVIORAL HEALTH)
DRESS: SCRUBS (BEHAVIORAL HEALTH);INDEPENDENT
ORAL_HYGIENE: INDEPENDENT
HYGIENE/GROOMING: INDEPENDENT
ORAL_HYGIENE: INDEPENDENT
HYGIENE/GROOMING: INDEPENDENT

## 2019-10-16 NOTE — PROGRESS NOTES
[facility]  Psychiatric Progress Note      Impression:   Abimbola was up and about on the unit for the first time today per her report. She also is experiencing cold sweats and some withdrawal symptoms but is trying to work through them. She was given valium per [facility] protocol. Abimbola reports she is still trying to cope with depression and the situations that surround her admission. She feels she does not deserve the help her family is offering. Discussed that she has helped them in the past and they are just returning the favor. She continues to struggle with her alcohol use and how it has affected her life. Encouraged her to use the support offered to her.            DIagnoses:   Major Depressive Disorder, recurrent, severe without psychosis  Alcohol Abuse Disorder             Plan:     Continue [facility]    ELOS:  1-3 days for stabilization and diminish withdrawals      Attestation:  Patient has been seen and evaluated by me,  Bernie Hernandez NP          Interim History:   The patient's care was discussed with the treatment team and chart notes were reviewed.          Medications:     Current Facility-Administered Medications Ordered in Epic   Medication Dose Route Frequency Last Rate Last Dose     acetaminophen (TYLENOL) tablet 650 mg  650 mg Oral Q4H PRN         cloNIDine (CATAPRES) tablet 0.1 mg  0.1 mg Oral BID   0.1 mg at 10/16/19 0830     diazepam (VALIUM) tablet 10 mg  10 mg Oral Q30 Min PRN   10 mg at 10/16/19 1015     FLUoxetine (PROzac) capsule 40 mg  40 mg Oral Daily   40 mg at 10/16/19 0830     folic acid (FOLVITE) tablet 1 mg  1 mg Oral Daily   1 mg at 10/16/19 0830     hydrOXYzine (ATARAX) tablet 25-50 mg  25-50 mg Oral Q4H PRN         magnesium hydroxide (MILK OF MAGNESIA) suspension 30 mL  30 mL Oral At Bedtime PRN         melatonin 3 mg (with vit B6 10 mg) extended release tablet 1 tablet  1 tablet Oral At Bedtime PRN         multivitamin w/minerals (THERA-VIT-M) tablet 1 tablet  1 tablet  "Oral Daily   1 tablet at 10/16/19 0830     nicotine (NICORETTE) gum 2-4 mg  2-4 mg Buccal Q1H PRN         OLANZapine (zyPREXA) tablet 10 mg  10 mg Oral Q8H PRN        Or     OLANZapine (zyPREXA) injection 10 mg  10 mg Intramuscular Q8H PRN         ondansetron (ZOFRAN) tablet 4 mg  4 mg Oral Q6H PRN   4 mg at 10/15/19 1254     thiamine tablet 100 mg  100 mg Oral Daily   100 mg at 10/16/19 0829     traZODone (DESYREL) tablet 50 mg  50 mg Oral At Bedtime PRN   50 mg at 10/14/19 2010     No current Epic-ordered outpatient medications on file.          10 point ROS negative        Allergies:     Allergies   Allergen Reactions     Codeine             Psychiatric Examination:   /64   Pulse 71   Temp 98.8  F (37.1  C) (Tympanic)   Resp 14   Ht 1.753 m (5' 9\")   Wt 87 kg (191 lb 14.4 oz)   SpO2 97%   BMI 28.34 kg/m    Weight is 191 lbs 14.4 oz  Body mass index is 28.34 kg/m .    Appearance:  awake, alert  Attitude:  cooperative  Eye Contact:  good  Mood:  depressed  Affect:  mood congruent  Speech:  clear, coherent  Psychomotor Behavior:  no evidence of tardive dyskinesia, dystonia, or tics  Thought Process:  logical and linear  Associations:  no loose associations  Thought Content:  no evidence of suicidal ideation or homicidal ideation and no evidence of psychotic thought  Insight:  fair  Judgment:  fair  Oriented to:  time, person, and place  Attention Span and Concentration:  intact  Recent and Remote Memory:  intact  Fund of Knowledge: appropriate  Muscle Strength and Tone: normal  Gait and Station: Normal           Labs:     Results for orders placed or performed during the hospital encounter of 10/13/19   CBC with platelets differential   Result Value Ref Range    WBC 10.0 4.0 - 11.0 10e9/L    RBC Count 4.54 3.8 - 5.2 10e12/L    Hemoglobin 14.4 11.7 - 15.7 g/dL    Hematocrit 42.3 35.0 - 47.0 %    MCV 93 78 - 100 fl    MCH 31.7 26.5 - 33.0 pg    MCHC 34.0 31.5 - 36.5 g/dL    RDW 13.2 10.0 - 15.0 %    " Platelet Count 570 (H) 150 - 450 10e9/L    Diff Method Automated Method     % Neutrophils 46.6 %    % Lymphocytes 43.6 %    % Monocytes 7.9 %    % Eosinophils 0.9 %    % Basophils 0.8 %    % Immature Granulocytes 0.2 %    Nucleated RBCs 0 0 /100    Absolute Neutrophil 4.6 1.6 - 8.3 10e9/L    Absolute Lymphocytes 4.3 0.8 - 5.3 10e9/L    Absolute Monocytes 0.8 0.0 - 1.3 10e9/L    Absolute Eosinophils 0.1 0.0 - 0.7 10e9/L    Absolute Basophils 0.1 0.0 - 0.2 10e9/L    Abs Immature Granulocytes 0.0 0 - 0.4 10e9/L    Absolute Nucleated RBC 0.0    Comprehensive metabolic panel   Result Value Ref Range    Sodium 140 133 - 144 mmol/L    Potassium 3.5 3.4 - 5.3 mmol/L    Chloride 106 94 - 109 mmol/L    Carbon Dioxide 23 20 - 32 mmol/L    Anion Gap 11 3 - 14 mmol/L    Glucose 104 (H) 70 - 99 mg/dL    Urea Nitrogen 9 7 - 30 mg/dL    Creatinine 0.52 0.52 - 1.04 mg/dL    GFR Estimate >90 >60 mL/min/[1.73_m2]    GFR Estimate If Black >90 >60 mL/min/[1.73_m2]    Calcium 8.2 (L) 8.5 - 10.1 mg/dL    Bilirubin Total 0.3 0.2 - 1.3 mg/dL    Albumin 3.9 3.4 - 5.0 g/dL    Protein Total 7.9 6.8 - 8.8 g/dL    Alkaline Phosphatase 82 40 - 150 U/L    ALT 36 0 - 50 U/L    AST 30 0 - 45 U/L   Salicylate level   Result Value Ref Range    Salicylate Level <2 mg/dL   Acetaminophen level   Result Value Ref Range    Acetaminophen Level <2 mg/L   Alcohol ethyl   Result Value Ref Range    Ethanol g/dL 0.26 (H) <0.01 g/dL   TSH   Result Value Ref Range    TSH 3.26 0.40 - 4.00 mU/L   Drug abuse screen 77 urine ([place])   Result Value Ref Range    Amphetamine Qual Urine Negative NEG^Negative    Barbiturates Qual Urine Negative NEG^Negative    Benzodiazepine Qual Urine Negative NEG^Negative    Cannabinoids Qual Urine Negative NEG^Negative    Cocaine Qual Urine Negative NEG^Negative    Opiates Qualitative Urine Negative NEG^Negative    PCP Qual Urine Negative NEG^Negative   UA reflex to Microscopic and Culture   Result Value Ref Range    Color Urine  Yellow     Appearance Urine Slightly Cloudy     Glucose Urine Negative NEG^Negative mg/dL    Bilirubin Urine Negative NEG^Negative    Ketones Urine 5 (A) NEG^Negative mg/dL    Specific Gravity Urine 1.021 1.003 - 1.035    Blood Urine Negative NEG^Negative    pH Urine 6.0 5.0 - 7.0 pH    Protein Albumin Urine 100 (A) NEG^Negative mg/dL    Urobilinogen mg/dL 0.0 0.0 - 2.0 mg/dL    Nitrite Urine Negative NEG^Negative    Leukocyte Esterase Urine Moderate (A) NEG^Negative    Source Midstream Urine     RBC Urine 1 0 - 2 /HPF    WBC Urine 16 (H) 0 - 5 /HPF    Bacteria Urine Few (A) NEG^Negative /HPF    Squamous Epithelial /HPF Urine 12 (H) 0 - 1 /HPF    Mucous Urine Present (A) NEG^Negative /LPF   HCG qualitative urine   Result Value Ref Range    HCG Qual Urine Negative NEG^Negative   Urine Culture Aerobic Bacterial   Result Value Ref Range    Specimen Description Midstream Urine     Special Requests Specimen received in preservative     Culture Micro <10,000 colonies/mL  mixed urogenital nicholas

## 2019-10-16 NOTE — PLAN OF CARE
Face to face end of shift report received from Kashif HERBERT RN. Rounding completed. Patient observed.     Gordon Moscoso RN  10/15/2019  11:52 PM    Problem: Adult Behavioral Health Plan of Care  Goal: Patient-Specific Goal (Individualization)  Description  Pt will follow recommendations of treatment team.  Pt will be compliant with medications.  Pt will sleep 6-8 hours each nights.  Pt will attend 50 % of groups.     10/15/2019 2352 by Gordon Moscoso RN  Outcome: No Change  Note:        Pt has been in bed with eyes closed and regular respirations observed all night. Will continue to monitor.    This AM pt scored a 2 on the CIWA this AM.     Face to face end of shift report will be given to oncoming RN.     Gordon Moscoso RN  10/16/2019  7:32 AM

## 2019-10-16 NOTE — PLAN OF CARE
Talked to Pt to check in. Pt stated she was feeling a little better after a medication was taken, but still feels weird. She jude feeling suicidal. Writer dicussed conversation her SW had with her mother this morning. Pt mentioned she was open to out patient treatment but did not want to do in patient. Pt did not want a Rule 25 but rather work with a therapist stating they are basically the same thing (out patient and therapy) and my drinking is a behavior I have because of my depression, without the depression I wouldn't have a drinking problem. Writer mentioned therapy and how her discharge paperwork will have more specific places and people on it that will be appropriate for her needs. Pt was still not interested in having an appointment set up, she rather do it on her own. Pt stated she was appreciative of this and will definitely use it.

## 2019-10-16 NOTE — PLAN OF CARE
Spoke with pt's mom Mary this morning- SORIN singed-She states she thinks pt needs treatment and that she spoke with [facility] about it and they require a CD assessment. Informed Mary that staff has spoken with pt about treatment and she has not been interested in this and a CD assessment can not be completed unless pt is willing to do this. Mary asks to have staff encourage pt to complete CD assessment while on the unit as she feels this would be best for her daughter. Mary also states pt's job and school are on hold and secured and she feels pt would be willing to go to treatment knowing these things will still be there when she done with treatment.

## 2019-10-16 NOTE — PLAN OF CARE
BEHAVIORAL TEAM DISCUSSION    Participants: Bernie Hernandez NP, Lexie Parker NP,  Esperanza Marvin LICSW, Kathy Quevedo LSW,  Odalis Ray LSW, Gatito Noguera LSW,  Zeny Wells RN, Mirlande Cormier OT, Sania Hand OT, Regine Jones OT  Progress: fair - pleasant and cooperative with staff  Continued Stay Criteria/Rationale: some depression, reports a lot of alcohol consumption, does not attend groups  Medical/Physical: CIWA (not scoring last 2 days)  Precautions:   Falls precaution?: None        Behavioral Orders   Procedures    Code 1 - Restrict to Unit    Routine Programming       As clinically indicated    Status 15       Every 15 minutes.      Plan: Plan for few more days stable and return home, pt does not want rule 25, pt is not interested in therapy at this time.  Rationale for change in precautions or plan:None      Active Problems:    Suicidal ideation    Current Facility-Administered Medications:     acetaminophen (TYLENOL) tablet 650 mg, 650 mg, Oral, Q4H PRN, Sania Pinto APRN CNP    cloNIDine (CATAPRES) tablet 0.1 mg, 0.1 mg, Oral, BID, Sania Pinto APRN CNP, 0.1 mg at 10/16/19 0830    diazepam (VALIUM) tablet 10 mg, 10 mg, Oral, Q30 Min PRN, Sania Pinto APRN CNP, 10 mg at 10/15/19 0031    FLUoxetine (PROzac) capsule 40 mg, 40 mg, Oral, Daily, WoSania robles APRN CNP, 40 mg at 10/16/19 0830    folic acid (FOLVITE) tablet 1 mg, 1 mg, Oral, Daily, Sania Pinto APRN CNP, 1 mg at 10/16/19 0830    hydrOXYzine (ATARAX) tablet 25-50 mg, 25-50 mg, Oral, Q4H PRN, Sania Pinto APRN CNP    magnesium hydroxide (MILK OF MAGNESIA) suspension 30 mL, 30 mL, Oral, At Bedtime PRN, Sania Pinto APRN CNP    melatonin 3 mg (with vit B6 10 mg) extended release tablet 1 tablet, 1 tablet, Oral, At Bedtime PRN, Woelpern, Saina Yumi, APRN CNP    multivitamin w/minerals (THERA-VIT-M) tablet 1 tablet, 1 tablet, Oral, Daily, Sania Pinto APRN CNP, 1 tablet  at 10/16/19 0830    nicotine (NICORETTE) gum 2-4 mg, 2-4 mg, Buccal, Q1H PRN, Sania Pinto APRN CNP    OLANZapine (zyPREXA) tablet 10 mg, 10 mg, Oral, Q8H PRN **OR** OLANZapine (zyPREXA) injection 10 mg, 10 mg, Intramuscular, Q8H PRN, Sania Pinto APRN CNP    ondansetron (ZOFRAN) tablet 4 mg, 4 mg, Oral, Q6H PRN, Sania Pinto APRN CNP, 4 mg at 10/15/19 1254    thiamine tablet 100 mg, 100 mg, Oral, Daily, Sania Pinto APRN CNP, 100 mg at 10/16/19 0829    traZODone (DESYREL) tablet 50 mg, 50 mg, Oral, At Bedtime PRN, Sania Pinto APRN CNP, 50 mg at 10/14/19 2010

## 2019-10-16 NOTE — PLAN OF CARE
"  Problem: Adult Behavioral Health Plan of Care  Goal: Patient-Specific Goal (Individualization)  Description  Pt will follow recommendations of treatment team.  Pt will be compliant with medications.  Pt will sleep 6-8 hours each nights.  Pt will attend 50 % of groups.     10/16/2019 0849 by Gladys Nuñez, RN  Outcome: Improving  Note:   Pt. Is willing to follow treatment team recommendations, compliant with taking prescribed medications, slept 5.5 hours last night, pt. States \"I don't like attending group therapy\", has not attended any therapy sessions yet this shift, spending time in dayroom. Cooperative with cares.  Face to face end of shift report will be communicated to oncoming afternoon shift RN.     Gladys Nuñez RN  10/16/2019  11:38 AM             Problem: Substance Withdrawal  Goal: Substance Withdrawal  Description  Pt will safely withdraw from alcohol during hospitalization.   Outcome: Improving  0800- Pt. Scored a 6 on CIWA scale.  1015- Pt. Scored a 13 on CIWA scale, medicated with valium 10 mg po for withdrawal symptoms.  1200 - Pt. Scored a 6 on CIWA scale, states \"I feel better now\"  "

## 2019-10-17 PROCEDURE — 99232 SBSQ HOSP IP/OBS MODERATE 35: CPT | Performed by: NURSE PRACTITIONER

## 2019-10-17 PROCEDURE — 25000132 ZZH RX MED GY IP 250 OP 250 PS 637: Performed by: NURSE PRACTITIONER

## 2019-10-17 PROCEDURE — 12400000 ZZH R&B MH

## 2019-10-17 PROCEDURE — 25000128 H RX IP 250 OP 636: Performed by: NURSE PRACTITIONER

## 2019-10-17 RX ADMIN — ONDANSETRON HYDROCHLORIDE 4 MG: 4 TABLET, FILM COATED ORAL at 08:37

## 2019-10-17 RX ADMIN — ACETAMINOPHEN 650 MG: 325 TABLET, FILM COATED ORAL at 22:09

## 2019-10-17 RX ADMIN — FOLIC ACID 1 MG: 1 TABLET ORAL at 08:14

## 2019-10-17 RX ADMIN — MULTIPLE VITAMINS W/ MINERALS TAB 1 TABLET: TAB at 08:14

## 2019-10-17 RX ADMIN — TRAZODONE HYDROCHLORIDE 50 MG: 50 TABLET ORAL at 22:15

## 2019-10-17 RX ADMIN — CLONIDINE HYDROCHLORIDE 0.1 MG: 0.1 TABLET ORAL at 20:38

## 2019-10-17 RX ADMIN — HYDROXYZINE HYDROCHLORIDE 50 MG: 25 TABLET ORAL at 13:11

## 2019-10-17 RX ADMIN — Medication 100 MG: at 08:14

## 2019-10-17 RX ADMIN — CLONIDINE HYDROCHLORIDE 0.1 MG: 0.1 TABLET ORAL at 08:15

## 2019-10-17 RX ADMIN — FLUOXETINE HYDROCHLORIDE 40 MG: 40 CAPSULE ORAL at 08:14

## 2019-10-17 RX ADMIN — HYDROXYZINE HYDROCHLORIDE 50 MG: 25 TABLET ORAL at 18:16

## 2019-10-17 RX ADMIN — DIAZEPAM 10 MG: 5 TABLET ORAL at 08:37

## 2019-10-17 ASSESSMENT — ACTIVITIES OF DAILY LIVING (ADL)
HYGIENE/GROOMING: INDEPENDENT
DRESS: SCRUBS (BEHAVIORAL HEALTH)
HYGIENE/GROOMING: INDEPENDENT
ORAL_HYGIENE: INDEPENDENT
DRESS: SCRUBS (BEHAVIORAL HEALTH);INDEPENDENT
ORAL_HYGIENE: INDEPENDENT

## 2019-10-17 NOTE — PLAN OF CARE
Attempted to meet with pt this afternoon and she was resting and did not rouse when prompted by staff.

## 2019-10-17 NOTE — PLAN OF CARE
"  Problem: Adult Behavioral Health Plan of Care  Goal: Patient-Specific Goal (Individualization)  Description  Pt will follow recommendations of treatment team.  Pt will be compliant with medications.  Pt will sleep 6-8 hours each nights.  Pt will attend 50 % of groups.       Outcome: No Change  Pt was up on the unit more this evening than previous. Did not attend groups. Continues with withdrawal symptoms. CIWA scores of '10' and '4' this shift. PRN Valium 10 mg PO given at 1725 with fair effect. Pt ate 100% of supper meal and was able to keep it down. Pt reported poor sleep last night. Reported continued depression and anxiety, but denied SI. Did express feeling \"heavy\" and hopeless, however.     Problem: Substance Withdrawal  Goal: Substance Withdrawal  Description  Pt will safely withdraw from alcohol during hospitalization.   Outcome: No Change        "

## 2019-10-17 NOTE — PROGRESS NOTES
[facility]  Psychiatric Progress Note      Impression:   Abimbola reports today is a good day, she is optimistic and feeling hopeful. She does still complain of nausea with vomiting however with shaking related to alcohol withdrawal. She is still uncomfortable with that process. She does say she will take the opportunity to live with her brother for a little bit. She reports he has been clean and sober for over ten years and he lives a very clean lifestyle. She feels this would be a positive step for her but is concerned about some of the independence she may lose. She also worries about cooking and eating. She often drank wine with meals and cooked with wine, many of her social activities centered around pairing events and going to [employer]. She feels this will be a struggle for a while. Abimbola does not want outpatient services established at this time.            DIagnoses:   Major Depressive Disorder, recurrent, severe without psychosis  Alcohol Abuse Disorder             Plan:     Continue CIWA    ELOS:  1-3 days for stabilization and diminish withdrawals      Attestation:  Patient has been seen and evaluated by me,  Bernie Hernandez NP          Interim History:   The patient's care was discussed with the treatment team and chart notes were reviewed.          Medications:     Current Facility-Administered Medications Ordered in Epic   Medication Dose Route Frequency Last Rate Last Dose     acetaminophen (TYLENOL) tablet 650 mg  650 mg Oral Q4H PRN         cloNIDine (CATAPRES) tablet 0.1 mg  0.1 mg Oral BID   0.1 mg at 10/17/19 0815     diazepam (VALIUM) tablet 10 mg  10 mg Oral Q30 Min PRN   10 mg at 10/17/19 0837     FLUoxetine (PROzac) capsule 40 mg  40 mg Oral Daily   40 mg at 10/17/19 0814     folic acid (FOLVITE) tablet 1 mg  1 mg Oral Daily   1 mg at 10/17/19 0814     hydrOXYzine (ATARAX) tablet 25-50 mg  25-50 mg Oral Q4H PRN         magnesium hydroxide (MILK OF MAGNESIA) suspension 30 mL  30  "mL Oral At Bedtime PRN         melatonin 3 mg (with vit B6 10 mg) extended release tablet 1 tablet  1 tablet Oral At Bedtime PRN         multivitamin w/minerals (THERA-VIT-M) tablet 1 tablet  1 tablet Oral Daily   1 tablet at 10/17/19 0814     nicotine (NICORETTE) gum 2-4 mg  2-4 mg Buccal Q1H PRN         OLANZapine (zyPREXA) tablet 10 mg  10 mg Oral Q8H PRN        Or     OLANZapine (zyPREXA) injection 10 mg  10 mg Intramuscular Q8H PRN         ondansetron (ZOFRAN) tablet 4 mg  4 mg Oral Q6H PRN   4 mg at 10/17/19 0837     thiamine tablet 100 mg  100 mg Oral Daily   100 mg at 10/17/19 0814     traZODone (DESYREL) tablet 50 mg  50 mg Oral At Bedtime PRN   50 mg at 10/14/19 2010     No current Epic-ordered outpatient medications on file.          10 point ROS negative        Allergies:     Allergies   Allergen Reactions     Codeine             Psychiatric Examination:   /78   Pulse 76   Temp 98  F (36.7  C) (Tympanic)   Resp 16   Ht 1.753 m (5' 9\")   Wt 87 kg (191 lb 14.4 oz)   SpO2 97%   BMI 28.34 kg/m    Weight is 191 lbs 14.4 oz  Body mass index is 28.34 kg/m .    Appearance:  awake, alert  Attitude:  cooperative  Eye Contact:  good  Mood:  depressed  Affect:  mood congruent  Speech:  clear, coherent  Psychomotor Behavior:  no evidence of tardive dyskinesia, dystonia, or tics  Thought Process:  logical and linear  Associations:  no loose associations  Thought Content:  no evidence of suicidal ideation or homicidal ideation and no evidence of psychotic thought  Insight:  fair  Judgment:  fair  Oriented to:  time, person, and place  Attention Span and Concentration:  intact  Recent and Remote Memory:  intact  Fund of Knowledge: appropriate  Muscle Strength and Tone: normal  Gait and Station: Normal           Labs:     Results for orders placed or performed during the hospital encounter of 10/13/19   CBC with platelets differential   Result Value Ref Range    WBC 10.0 4.0 - 11.0 10e9/L    RBC Count 4.54 " 3.8 - 5.2 10e12/L    Hemoglobin 14.4 11.7 - 15.7 g/dL    Hematocrit 42.3 35.0 - 47.0 %    MCV 93 78 - 100 fl    MCH 31.7 26.5 - 33.0 pg    MCHC 34.0 31.5 - 36.5 g/dL    RDW 13.2 10.0 - 15.0 %    Platelet Count 570 (H) 150 - 450 10e9/L    Diff Method Automated Method     % Neutrophils 46.6 %    % Lymphocytes 43.6 %    % Monocytes 7.9 %    % Eosinophils 0.9 %    % Basophils 0.8 %    % Immature Granulocytes 0.2 %    Nucleated RBCs 0 0 /100    Absolute Neutrophil 4.6 1.6 - 8.3 10e9/L    Absolute Lymphocytes 4.3 0.8 - 5.3 10e9/L    Absolute Monocytes 0.8 0.0 - 1.3 10e9/L    Absolute Eosinophils 0.1 0.0 - 0.7 10e9/L    Absolute Basophils 0.1 0.0 - 0.2 10e9/L    Abs Immature Granulocytes 0.0 0 - 0.4 10e9/L    Absolute Nucleated RBC 0.0    Comprehensive metabolic panel   Result Value Ref Range    Sodium 140 133 - 144 mmol/L    Potassium 3.5 3.4 - 5.3 mmol/L    Chloride 106 94 - 109 mmol/L    Carbon Dioxide 23 20 - 32 mmol/L    Anion Gap 11 3 - 14 mmol/L    Glucose 104 (H) 70 - 99 mg/dL    Urea Nitrogen 9 7 - 30 mg/dL    Creatinine 0.52 0.52 - 1.04 mg/dL    GFR Estimate >90 >60 mL/min/[1.73_m2]    GFR Estimate If Black >90 >60 mL/min/[1.73_m2]    Calcium 8.2 (L) 8.5 - 10.1 mg/dL    Bilirubin Total 0.3 0.2 - 1.3 mg/dL    Albumin 3.9 3.4 - 5.0 g/dL    Protein Total 7.9 6.8 - 8.8 g/dL    Alkaline Phosphatase 82 40 - 150 U/L    ALT 36 0 - 50 U/L    AST 30 0 - 45 U/L   Salicylate level   Result Value Ref Range    Salicylate Level <2 mg/dL   Acetaminophen level   Result Value Ref Range    Acetaminophen Level <2 mg/L   Alcohol ethyl   Result Value Ref Range    Ethanol g/dL 0.26 (H) <0.01 g/dL   TSH   Result Value Ref Range    TSH 3.26 0.40 - 4.00 mU/L   Drug abuse screen 77 urine ([place])   Result Value Ref Range    Amphetamine Qual Urine Negative NEG^Negative    Barbiturates Qual Urine Negative NEG^Negative    Benzodiazepine Qual Urine Negative NEG^Negative    Cannabinoids Qual Urine Negative NEG^Negative    Cocaine Qual Urine  Negative NEG^Negative    Opiates Qualitative Urine Negative NEG^Negative    PCP Qual Urine Negative NEG^Negative   UA reflex to Microscopic and Culture   Result Value Ref Range    Color Urine Yellow     Appearance Urine Slightly Cloudy     Glucose Urine Negative NEG^Negative mg/dL    Bilirubin Urine Negative NEG^Negative    Ketones Urine 5 (A) NEG^Negative mg/dL    Specific Gravity Urine 1.021 1.003 - 1.035    Blood Urine Negative NEG^Negative    pH Urine 6.0 5.0 - 7.0 pH    Protein Albumin Urine 100 (A) NEG^Negative mg/dL    Urobilinogen mg/dL 0.0 0.0 - 2.0 mg/dL    Nitrite Urine Negative NEG^Negative    Leukocyte Esterase Urine Moderate (A) NEG^Negative    Source Midstream Urine     RBC Urine 1 0 - 2 /HPF    WBC Urine 16 (H) 0 - 5 /HPF    Bacteria Urine Few (A) NEG^Negative /HPF    Squamous Epithelial /HPF Urine 12 (H) 0 - 1 /HPF    Mucous Urine Present (A) NEG^Negative /LPF   HCG qualitative urine   Result Value Ref Range    HCG Qual Urine Negative NEG^Negative   Urine Culture Aerobic Bacterial   Result Value Ref Range    Specimen Description Midstream Urine     Special Requests Specimen received in preservative     Culture Micro <10,000 colonies/mL  mixed urogenital nicholas

## 2019-10-17 NOTE — PLAN OF CARE
Problem: Adult Behavioral Health Plan of Care  Goal: Patient-Specific Goal (Individualization)  Description  Pt will follow recommendations of treatment team.  Pt will be compliant with medications.  Pt will sleep 6-8 hours each nights.  Pt will attend 50 % of groups.     10/17/2019 0921 by Gladys Nuñez, RN  Outcome: Improving  Note:     Pt. Is willing to follow treatment team recommendations, compliant with taking prescribed medications, slept 7 hours last night, encouraged to attend group therapy, has been sitting in lounge area this morning, reported emesis after breakfast.  0840-  Pt. Scored a 10 on the CIWA scale, medicated with valium 10 mg po for alcohol withdrawal symptoms, zofran 4 mg po given for nausea and vomiting.   1313- Pt. Requested/received atarax 50 mg po for anxiety.  Problem: Substance Withdrawal  Goal: Substance Withdrawal  Description  Pt will safely withdraw from alcohol during hospitalization.   10/17/2019 0921 by Gladys Nuñez, RN  Outcome: Improving   Pt. Continues to score on the CIWA scale, medicated occasionally per protocol, see above note.  Face to face end of shift report will be communicated to oncoming afternoon shift RN.     Gladys Nuñez, KIM  10/17/2019  10:34 AM

## 2019-10-17 NOTE — PLAN OF CARE
"Face to face end of shift report received from Chelo KEANE RN. Rounding completed. Patient observed.     Gordon Moscoso RN  10/17/2019  12:17 AM    Problem: Adult Behavioral Health Plan of Care  Goal: Patient-Specific Goal (Individualization)  Description  Pt will follow recommendations of treatment team.  Pt will be compliant with medications.  Pt will sleep 6-8 hours each nights.  Pt will attend 50 % of groups.     10/17/2019 0016 by Gordon Moscoso, RN  Outcome: No Change  Note:        Pt has been in bed with eyes closed and regular respirations observed all night. Will continue to monitor.    Pt scored a 1 on the CIWA this AM. Pt stated that she is feeling \"really good.\"  "

## 2019-10-17 NOTE — PLAN OF CARE
BEHAVIORAL TEAM DISCUSSION     Participants: Bernie Hernandez NP, Lexie Parker NP, Kathy Quevedo LSW,  Odalis Ray LSW, Gatito Noguera LSW,  Zeny Wells RN, Mirlande Cormier OT,  Regine Jones OT  Progress: fair - pleasant and cooperative with staff  Continued Stay Criteria/Rationale: some depression, reports a lot of alcohol consumption, does not attend groups  Medical/Physical: CIWA (not scoring last 2 days)  Precautions:   Falls precaution?: None           Behavioral Orders   Procedures    Code 1 - Restrict to Unit    Routine Programming       As clinically indicated    Status 15       Every 15 minutes.      Plan: Plan for few more days stable and return home, pt does not want rule 25, pt is not interested in therapy at this time. Wants to stay with brother once stabilized and able to discharge.   Rationale for change in precautions or plan:None      Active Problems:    Suicidal ideation      Current Facility-Administered Medications:     acetaminophen (TYLENOL) tablet 650 mg, 650 mg, Oral, Q4H PRN, Sania Pinto APRN CNP    cloNIDine (CATAPRES) tablet 0.1 mg, 0.1 mg, Oral, BID, Sania Pinto APRN CNP, 0.1 mg at 10/17/19 0815    diazepam (VALIUM) tablet 10 mg, 10 mg, Oral, Q30 Min PRN, Sania Pinto APRN CNP, 10 mg at 10/17/19 0837    FLUoxetine (PROzac) capsule 40 mg, 40 mg, Oral, Daily, WoelSania gallegos APRN CNP, 40 mg at 10/17/19 0814    folic acid (FOLVITE) tablet 1 mg, 1 mg, Oral, Daily, WoSania robles APRN CNP, 1 mg at 10/17/19 0814    hydrOXYzine (ATARAX) tablet 25-50 mg, 25-50 mg, Oral, Q4H PRN, Sania Pinto APRN CNP    magnesium hydroxide (MILK OF MAGNESIA) suspension 30 mL, 30 mL, Oral, At Bedtime PRN, Sania Pinto APRN CNP    melatonin 3 mg (with vit B6 10 mg) extended release tablet 1 tablet, 1 tablet, Oral, At Bedtime PRN, Sania Pinto APRN CNP    multivitamin w/minerals (THERA-VIT-M) tablet 1 tablet, 1 tablet, Oral, Daily, Blair,  OBIE Pérez CNP, 1 tablet at 10/17/19 0814    nicotine (NICORETTE) gum 2-4 mg, 2-4 mg, Buccal, Q1H PRN, Sania Pinto APRN CNP    OLANZapine (zyPREXA) tablet 10 mg, 10 mg, Oral, Q8H PRN **OR** OLANZapine (zyPREXA) injection 10 mg, 10 mg, Intramuscular, Q8H PRN, Sania Pinto APRN CNP    ondansetron (ZOFRAN) tablet 4 mg, 4 mg, Oral, Q6H PRN, Sania Pinto APRN CNP, 4 mg at 10/17/19 0837    thiamine tablet 100 mg, 100 mg, Oral, Daily, Sania Pinto APRN CNP, 100 mg at 10/17/19 0814    traZODone (DESYREL) tablet 50 mg, 50 mg, Oral, At Bedtime PRN, Sania Pinto APRN CNP, 50 mg at 10/14/19 2010

## 2019-10-18 LAB
ALBUMIN UR-MCNC: NEGATIVE MG/DL
APPEARANCE UR: CLEAR
BACTERIA #/AREA URNS HPF: ABNORMAL /HPF
BILIRUB UR QL STRIP: NEGATIVE
COLOR UR AUTO: YELLOW
GLUCOSE UR STRIP-MCNC: NEGATIVE MG/DL
HGB UR QL STRIP: ABNORMAL
KETONES UR STRIP-MCNC: NEGATIVE MG/DL
LEUKOCYTE ESTERASE UR QL STRIP: NEGATIVE
MUCOUS THREADS #/AREA URNS LPF: PRESENT /LPF
NITRATE UR QL: NEGATIVE
PH UR STRIP: 6 PH (ref 4.7–8)
RBC #/AREA URNS AUTO: <1 /HPF (ref 0–2)
SOURCE: ABNORMAL
SP GR UR STRIP: 1.01 (ref 1–1.03)
UROBILINOGEN UR STRIP-MCNC: NORMAL MG/DL (ref 0–2)
WBC #/AREA URNS AUTO: 2 /HPF (ref 0–5)

## 2019-10-18 PROCEDURE — 12400000 ZZH R&B MH

## 2019-10-18 PROCEDURE — 81001 URINALYSIS AUTO W/SCOPE: CPT | Performed by: NURSE PRACTITIONER

## 2019-10-18 PROCEDURE — 25000132 ZZH RX MED GY IP 250 OP 250 PS 637: Performed by: NURSE PRACTITIONER

## 2019-10-18 PROCEDURE — 25000128 H RX IP 250 OP 636: Performed by: NURSE PRACTITIONER

## 2019-10-18 RX ADMIN — CLONIDINE HYDROCHLORIDE 0.1 MG: 0.1 TABLET ORAL at 20:22

## 2019-10-18 RX ADMIN — MULTIPLE VITAMINS W/ MINERALS TAB 1 TABLET: TAB at 08:51

## 2019-10-18 RX ADMIN — CLONIDINE HYDROCHLORIDE 0.1 MG: 0.1 TABLET ORAL at 08:50

## 2019-10-18 RX ADMIN — ONDANSETRON HYDROCHLORIDE 4 MG: 4 TABLET, FILM COATED ORAL at 07:53

## 2019-10-18 RX ADMIN — Medication 100 MG: at 08:50

## 2019-10-18 RX ADMIN — HYDROXYZINE HYDROCHLORIDE 50 MG: 25 TABLET ORAL at 17:44

## 2019-10-18 RX ADMIN — FOLIC ACID 1 MG: 1 TABLET ORAL at 08:51

## 2019-10-18 RX ADMIN — DIAZEPAM 10 MG: 5 TABLET ORAL at 07:53

## 2019-10-18 RX ADMIN — FLUOXETINE HYDROCHLORIDE 40 MG: 40 CAPSULE ORAL at 08:51

## 2019-10-18 ASSESSMENT — ACTIVITIES OF DAILY LIVING (ADL)
DRESS: SCRUBS (BEHAVIORAL HEALTH)
HYGIENE/GROOMING: INDEPENDENT
HYGIENE/GROOMING: INDEPENDENT
ORAL_HYGIENE: INDEPENDENT
ORAL_HYGIENE: INDEPENDENT
DRESS: SCRUBS (BEHAVIORAL HEALTH);INDEPENDENT

## 2019-10-18 NOTE — PLAN OF CARE
Problem: Substance Withdrawal  Goal: Substance Withdrawal  Description  Pt will safely withdraw from alcohol during hospitalization.   10/18/2019 0848 by Gladys Nuñez RN  Outcome: No Change   Pt. Scored a 9 on the CIWA scale at 0755, medicated with valium 10 mg po per protocol, pt. In agreement with this, medicated with zofran 4 mg po for nausea per pt. Request.  1000-  Urine sent to lab.  Problem: Adult Behavioral Health Plan of Care  Goal: Patient-Specific Goal (Individualization)  Description  Pt will follow recommendations of treatment team.  Pt will be compliant with medications.  Pt will sleep 6-8 hours each nights.  Pt will attend 50 % of groups.     10/18/2019 0848 by Gladys Nuñez RN  Outcome: Improving  Note:   Pt. Is willing to follow treatment team recommendations, compliant with taking prescribed medications, slept 7 hours last night, attending group therapy in dayroom, cooperative with cares.  Face to face end of shift report will be communicated to oncoming afternoon shift RN.     Gladys Nuñez RN  10/18/2019  11:00 AM

## 2019-10-18 NOTE — PLAN OF CARE
"Problem: Adult Behavioral Health Plan of Care  Goal: Patient-Specific Goal (Individualization)  Description  Pt will follow recommendations of treatment team.  Pt will be compliant with medications.  Pt will sleep 6-8 hours each nights.  Pt will attend 50 % of groups.      10/17/2019 2149 by Chelo Herrera, RN  Outcome: No Change  Pt was up on the unit intermittently throughout the evening. Ate 100% of supper meal and was able to keep it down. Reported improved appetite. Did not attend any groups. Did socialize with a female peer on the unit. Affect brighter. Pt reported improved mood, but continued anxiety. Denied SI. PRN Hydroxyzine 50 mg PO given at approximately 1815 with fair effect. Pt slept approximately 7 hours last night.     2210 Pt c/o \"period cramps.\" PRN Tylenol 650 mg PO given. PRN Trazodone 50 mg PO given for sleep at this time, as well.    Problem: Substance Withdrawal  Goal: Substance Withdrawal  Description  Pt will safely withdraw from alcohol during hospitalization.   10/17/2019 2149 by Chelo Herrera, RN  Outcome: No Change  CIWA monitoring continues. Score of '3' on the assessment tool this evening. Pt reported improved withdrawal symptoms this evening.      "

## 2019-10-18 NOTE — PLAN OF CARE
Face to face end of shift report received from Chelo KEANE RN. Rounding completed. Patient observed.     Gordon Moscoso RN  10/18/2019  12:16 AM    Problem: Adult Behavioral Health Plan of Care  Goal: Patient-Specific Goal (Individualization)  Description  Pt will follow recommendations of treatment team.  Pt will be compliant with medications.  Pt will sleep 6-8 hours each nights.  Pt will attend 50 % of groups.     10/18/2019 0016 by Gordon Moscoso RN  Outcome: No Change  Note:       Pt has been in bed with eyes closed and regular respirations observed all night. Will continue to monitor.    Pt monitored through the night for signs of withdrawal. Pt appeared to be at ease without any signs. This AM pt scored a 1 on the CIWA.     Face to face end of shift report will be given to oncoming RN.     Gordon Moscoso RN  10/18/2019  5:41 AM

## 2019-10-18 NOTE — PLAN OF CARE
BEHAVIORAL TEAM DISCUSSION    Participants: Bernie Hernandez NP, Odalis Ray LSW, Gatito Noguera LSW, Edna Trujillo RN, Maryjane Acevedo RN, Mirlande Cormier OT, Sania Hand OT, Regine Jones OT, [facility]  Progress: fair  Continued Stay Criteria/Rationale: anxiety, lingering depression  Medical/Physical: withdrawal symptoms - CIWA  Precautions:   Falls precaution?: No  Behavioral Orders   Procedures    Code 1 - Restrict to Unit    Routine Programming     As clinically indicated    Status 15     Every 15 minutes.     Plan: stabilize and discharge to [facility], not interested in Rule 25 or therapy  Rationale for change in precautions or plan: None    Active Problems:    Suicidal ideation       Current Facility-Administered Medications:     acetaminophen (TYLENOL) tablet 650 mg, 650 mg, Oral, Q4H PRN, Sania Pinto APRN CNP, 650 mg at 10/17/19 2209    cloNIDine (CATAPRES) tablet 0.1 mg, 0.1 mg, Oral, BID, Sania Pinto APRN CNP, 0.1 mg at 10/18/19 0850    diazepam (VALIUM) tablet 10 mg, 10 mg, Oral, Q30 Min PRN, Sania Pinto APRN CNP, 10 mg at 10/18/19 0753    FLUoxetine (PROzac) capsule 40 mg, 40 mg, Oral, Daily, WoelperSania barroso APRN CNP, 40 mg at 10/18/19 0851    folic acid (FOLVITE) tablet 1 mg, 1 mg, Oral, Daily, Sania Pinto APRN CNP, 1 mg at 10/18/19 0851    hydrOXYzine (ATARAX) tablet 25-50 mg, 25-50 mg, Oral, Q4H PRN, Sania Pinto APRN CNP, 50 mg at 10/17/19 1816    magnesium hydroxide (MILK OF MAGNESIA) suspension 30 mL, 30 mL, Oral, At Bedtime PRN, Sania Pinto APRN CNP    melatonin 3 mg (with vit B6 10 mg) extended release tablet 1 tablet, 1 tablet, Oral, At Bedtime PRN, Sania Pinto APRN CNP    multivitamin w/minerals (THERA-VIT-M) tablet 1 tablet, 1 tablet, Oral, Daily, Sania Pinto APRN CNP, 1 tablet at 10/18/19 0851    nicotine (NICORETTE) gum 2-4 mg, 2-4 mg, Buccal, Q1H PRN, Sania Pinto APRN CNP     OLANZapine (zyPREXA) tablet 10 mg, 10 mg, Oral, Q8H PRN **OR** OLANZapine (zyPREXA) injection 10 mg, 10 mg, Intramuscular, Q8H PRN, Sania Pinto APRTOMI CNP    ondansetron (ZOFRAN) tablet 4 mg, 4 mg, Oral, Q6H PRN, Sania Pinto APRN CNP, 4 mg at 10/18/19 5886    traZODone (DESYREL) tablet 50 mg, 50 mg, Oral, At Bedtime PRN, Sania Pinto APRTOMI CNP, 50 mg at 10/17/19 5220

## 2019-10-19 PROCEDURE — 99232 SBSQ HOSP IP/OBS MODERATE 35: CPT | Performed by: NURSE PRACTITIONER

## 2019-10-19 PROCEDURE — 25000128 H RX IP 250 OP 636: Performed by: NURSE PRACTITIONER

## 2019-10-19 PROCEDURE — 12400000 ZZH R&B MH

## 2019-10-19 PROCEDURE — 25000132 ZZH RX MED GY IP 250 OP 250 PS 637: Performed by: NURSE PRACTITIONER

## 2019-10-19 RX ADMIN — ONDANSETRON HYDROCHLORIDE 4 MG: 4 TABLET, FILM COATED ORAL at 08:12

## 2019-10-19 RX ADMIN — CLONIDINE HYDROCHLORIDE 0.1 MG: 0.1 TABLET ORAL at 08:13

## 2019-10-19 RX ADMIN — DIAZEPAM 10 MG: 5 TABLET ORAL at 07:14

## 2019-10-19 RX ADMIN — MULTIPLE VITAMINS W/ MINERALS TAB 1 TABLET: TAB at 08:13

## 2019-10-19 RX ADMIN — CLONIDINE HYDROCHLORIDE 0.1 MG: 0.1 TABLET ORAL at 21:32

## 2019-10-19 RX ADMIN — FLUOXETINE HYDROCHLORIDE 40 MG: 40 CAPSULE ORAL at 08:13

## 2019-10-19 RX ADMIN — FOLIC ACID 1 MG: 1 TABLET ORAL at 08:13

## 2019-10-19 RX ADMIN — HYDROXYZINE HYDROCHLORIDE 50 MG: 25 TABLET ORAL at 12:41

## 2019-10-19 RX ADMIN — DIAZEPAM 10 MG: 5 TABLET ORAL at 15:06

## 2019-10-19 ASSESSMENT — ACTIVITIES OF DAILY LIVING (ADL)
ORAL_HYGIENE: INDEPENDENT
HYGIENE/GROOMING: INDEPENDENT
LAUNDRY: UNABLE TO COMPLETE
DRESS: SCRUBS (BEHAVIORAL HEALTH)
DRESS: SCRUBS (BEHAVIORAL HEALTH)
LAUNDRY: UNABLE TO COMPLETE
HYGIENE/GROOMING: INDEPENDENT
ORAL_HYGIENE: INDEPENDENT

## 2019-10-19 NOTE — PLAN OF CARE
Face to face end of shift report will be communicated to oncoming RN.     Problem: Adult Behavioral Health Plan of Care  Goal: Patient-Specific Goal (Individualization)  Description  Pt will follow recommendations of treatment team.  Pt will be compliant with medications.  Pt will sleep 6-8 hours each nights.  Pt will attend 50 % of groups.     10/19/2019 0607 by Nadine Sage RN  Outcome: No Change     Problem: Substance Withdrawal  Goal: Substance Withdrawal  Description  Pt will safely withdraw from alcohol during hospitalization.   10/19/2019 0607 by Nadine Sage RN  Outcome: Improving  0015- Pt sleeping with no s/s of withdrawal or distress. Will continue to monitor.   Pt appears to be sleeping in bed with eyes closed, having regular respirations and position changes. 15 minutes and PRN safety checks completed with no noted complains.  Will continue to monitor.   0715- Pt score 10 on CIWA scale due to tremors, anxiety, nausea, and slight sweat. Valium 10 mg given. Will continue to monitor.

## 2019-10-19 NOTE — PROGRESS NOTES
[facility]  Psychiatric Progress Note      Impression:   Abimbola reports having more severe withdrawal symptoms in the morning. She feels better in the afternoon and evening. She still has ongoing depression and anxiety but feels they are getting a bit better. Abimbola is asking about discharge and we discuss maybe discharging Early to mid week. She does want to make sure she has most of the withdrawal symptoms under control. She does feel taking Zofran before breakfast is helpful. Repeat UA is improved from admission.            DIagnoses:   Major Depressive Disorder, recurrent, severe without psychosis  Alcohol Abuse Disorder             Plan:     Continue CIWA    ELOS:  1-3 days for stabilization and diminish withdrawals      Attestation:  Patient has been seen and evaluated by me,  Bernie Hernandez NP          Interim History:   The patient's care was discussed with the treatment team and chart notes were reviewed.          Medications:     Current Facility-Administered Medications Ordered in Epic   Medication Dose Route Frequency Last Rate Last Dose     acetaminophen (TYLENOL) tablet 650 mg  650 mg Oral Q4H PRN   650 mg at 10/17/19 2209     cloNIDine (CATAPRES) tablet 0.1 mg  0.1 mg Oral BID   0.1 mg at 10/19/19 0813     diazepam (VALIUM) tablet 10 mg  10 mg Oral Q30 Min PRN   10 mg at 10/19/19 0714     FLUoxetine (PROzac) capsule 40 mg  40 mg Oral Daily   40 mg at 10/19/19 0813     folic acid (FOLVITE) tablet 1 mg  1 mg Oral Daily   1 mg at 10/19/19 0813     hydrOXYzine (ATARAX) tablet 25-50 mg  25-50 mg Oral Q4H PRN   50 mg at 10/18/19 1744     magnesium hydroxide (MILK OF MAGNESIA) suspension 30 mL  30 mL Oral At Bedtime PRN         melatonin 3 mg (with vit B6 10 mg) extended release tablet 1 tablet  1 tablet Oral At Bedtime PRN         multivitamin w/minerals (THERA-VIT-M) tablet 1 tablet  1 tablet Oral Daily   1 tablet at 10/19/19 0813     nicotine (NICORETTE) gum 2-4 mg  2-4 mg Buccal Q1H PRN    "      OLANZapine (zyPREXA) tablet 10 mg  10 mg Oral Q8H PRN        Or     OLANZapine (zyPREXA) injection 10 mg  10 mg Intramuscular Q8H PRN         ondansetron (ZOFRAN) tablet 4 mg  4 mg Oral Q6H PRN   4 mg at 10/19/19 0812     traZODone (DESYREL) tablet 50 mg  50 mg Oral At Bedtime PRN   50 mg at 10/17/19 2227     No current Epic-ordered outpatient medications on file.          10 point ROS negative        Allergies:     Allergies   Allergen Reactions     Codeine             Psychiatric Examination:   /80   Pulse 65   Temp 97.7  F (36.5  C) (Tympanic)   Resp 14   Ht 1.753 m (5' 9\")   Wt 87 kg (191 lb 14.4 oz)   SpO2 98%   BMI 28.34 kg/m    Weight is 191 lbs 14.4 oz  Body mass index is 28.34 kg/m .    Appearance:  awake, alert  Attitude:  cooperative  Eye Contact:  good  Mood:  depressed  Affect:  mood congruent  Speech:  clear, coherent  Psychomotor Behavior:  no evidence of tardive dyskinesia, dystonia, or tics  Thought Process:  logical and linear  Associations:  no loose associations  Thought Content:  no evidence of suicidal ideation or homicidal ideation and no evidence of psychotic thought  Insight:  fair  Judgment:  fair  Oriented to:  time, person, and place  Attention Span and Concentration:  intact  Recent and Remote Memory:  intact  Fund of Knowledge: appropriate  Muscle Strength and Tone: normal  Gait and Station: Normal           Labs:     Results for orders placed or performed during the hospital encounter of 10/14/19   UA reflex to Microscopic and Culture   Result Value Ref Range    Color Urine Yellow     Appearance Urine Clear     Glucose Urine Negative NEG^Negative mg/dL    Bilirubin Urine Negative NEG^Negative    Ketones Urine Negative NEG^Negative mg/dL    Specific Gravity Urine 1.015 1.003 - 1.035    Blood Urine Trace (A) NEG^Negative    pH Urine 6.0 4.7 - 8.0 pH    Protein Albumin Urine Negative NEG^Negative mg/dL    Urobilinogen mg/dL Normal 0.0 - 2.0 mg/dL    Nitrite Urine " Negative NEG^Negative    Leukocyte Esterase Urine Negative NEG^Negative    Source Midstream Urine     RBC Urine <1 0 - 2 /HPF    WBC Urine 2 0 - 5 /HPF    Bacteria Urine Few (A) NEG^Negative /HPF    Mucous Urine Present (A) NEG^Negative /LPF

## 2019-10-19 NOTE — PLAN OF CARE
"Face to face end of shift report communicated to oncoming evening RN.     Mackenzie Grady RN  10/19/2019  3:04 PM              1200 pt scored a 5 on CIWA , but did request and receive Atarax 50 mg at 1241.  1500 pt came to nurses station saying \"I'm starting to feel really bad.\"  She scored an 11 on the CIWA and received 10 mg of Valium.        Problem: Adult Behavioral Health Plan of Care  Goal: Patient-Specific Goal (Individualization)  Description  Pt will follow recommendations of treatment team.  Pt will be compliant with medications.  Pt will sleep 6-8 hours each nights.  Pt will attend 50 % of groups.     10/19/2019 1034 by Mackenzie Grady, RN  Note:   Pt was cooperative with nursing assessment and medications. She reported that the Valium she received this morning helped her.  She did have Zofran 4 mg @ 0813, which she reports helped and she was able to eat breakfast.  Pt then when to lay down to sleep.           Problem: Substance Withdrawal  Goal: Substance Withdrawal  Description  Pt will safely withdraw from alcohol during hospitalization.   10/19/2019 1034 by Mackenzie Grady, RN  Note:   Pt continues to be assessed per CWA.      "

## 2019-10-19 NOTE — PLAN OF CARE
Face to face end of shift report obtained from KIM Whitney. Pt observed resting in bed.     CHAD DRISCOLL RN  10/19/2019  12:32 AM

## 2019-10-19 NOTE — PLAN OF CARE
Problem: Adult Behavioral Health Plan of Care  Goal: Patient-Specific Goal (Individualization)  Description  Pt will follow recommendations of treatment team.  Pt will be compliant with medications.  Pt will sleep 6-8 hours each nights.  Pt will attend 50 % of groups.       Outcome: No Change  Pt was up on the unit much of the shift. Attended group at the beginning of the shift. Social with peers on the unit. Reported continued anxiety and mild withdrawal symptoms. Score of '5' on the CIWA this shift. Requested and received PRN Hydroxyzine 50 mg PO at approximately 1745 with good effect. Ate 100% of supper meal. No nausea and/or vomiting. Slept approximately 7 hours last night.     Problem: Substance Withdrawal  Goal: Substance Withdrawal  Description  Pt will safely withdraw from alcohol during hospitalization.   Outcome: No Change

## 2019-10-20 PROCEDURE — 12400000 ZZH R&B MH

## 2019-10-20 PROCEDURE — 25000132 ZZH RX MED GY IP 250 OP 250 PS 637: Performed by: NURSE PRACTITIONER

## 2019-10-20 PROCEDURE — 25000128 H RX IP 250 OP 636: Performed by: NURSE PRACTITIONER

## 2019-10-20 RX ADMIN — DIAZEPAM 10 MG: 5 TABLET ORAL at 20:48

## 2019-10-20 RX ADMIN — DIAZEPAM 10 MG: 5 TABLET ORAL at 15:33

## 2019-10-20 RX ADMIN — MULTIPLE VITAMINS W/ MINERALS TAB 1 TABLET: TAB at 08:09

## 2019-10-20 RX ADMIN — DIAZEPAM 10 MG: 5 TABLET ORAL at 08:10

## 2019-10-20 RX ADMIN — CLONIDINE HYDROCHLORIDE 0.1 MG: 0.1 TABLET ORAL at 08:09

## 2019-10-20 RX ADMIN — CLONIDINE HYDROCHLORIDE 0.1 MG: 0.1 TABLET ORAL at 20:35

## 2019-10-20 RX ADMIN — FOLIC ACID 1 MG: 1 TABLET ORAL at 08:09

## 2019-10-20 RX ADMIN — HYDROXYZINE HYDROCHLORIDE 50 MG: 25 TABLET ORAL at 12:00

## 2019-10-20 RX ADMIN — FLUOXETINE HYDROCHLORIDE 40 MG: 40 CAPSULE ORAL at 08:09

## 2019-10-20 RX ADMIN — ONDANSETRON HYDROCHLORIDE 4 MG: 4 TABLET, FILM COATED ORAL at 08:09

## 2019-10-20 ASSESSMENT — ACTIVITIES OF DAILY LIVING (ADL)
LAUNDRY: UNABLE TO COMPLETE
HYGIENE/GROOMING: INDEPENDENT
DRESS: SCRUBS (BEHAVIORAL HEALTH)
ORAL_HYGIENE: INDEPENDENT

## 2019-10-20 ASSESSMENT — MIFFLIN-ST. JEOR: SCORE: 1655.75

## 2019-10-20 NOTE — PLAN OF CARE
"During morning group, pt discussed symptoms she experiences. 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 Pt reports during these phases, she drinks to reward herself- during depressive phases, pt drinks because of feelings of worthlessness. Pt states that they feel a distinct shift between people, talks about having different core beliefs during these phases.     Pt does not that she has not had a span of time in recent memory where mood was stable or normal- tends to alternate between from low to high with no inbetween. Notes exhaustion from this pattern, feelings of instability and uncertainty about ability to achieve goals in future due to the unpredictable nature of moods, uncertainty about who she is at a baseline mood. Writer encourages pt to discuss these symptoms with NP at next meeting, informs pts RN of the pts report of bx during \"high\" periods.   "

## 2019-10-20 NOTE — PLAN OF CARE
Patient up at nurses' station stating that she was feeling very anxious, visible tremors and sweat noted. Patient stated that she has been nauseous and vomited. Patient scored a 19 on the CIWA. 10 mg of valium given at 1533.

## 2019-10-20 NOTE — PLAN OF CARE
Face to face end of shift report will be communicated to oncoming RN.    Problem: Adult Behavioral Health Plan of Care  Goal: Patient-Specific Goal (Individualization)  Description  Pt will follow recommendations of treatment team.  Pt will be compliant with medications.  Pt will sleep 6-8 hours each nights.  Pt will attend 50 % of groups.     10/20/2019 0150 by Nadine Sage RN  Outcome: No Change     Problem: Substance Withdrawal  Goal: Substance Withdrawal  Description  Pt will safely withdraw from alcohol during hospitalization.   10/20/2019 0150 by Nadine Sage RN  Outcome: No Change  0005- Pt appears to be sleeping with no s/s of discomfort of withdrawal. Will continue to monitor.   0400-Pt appears to be sleeping in bed with eyes closed, having regular respirations and position changes. No tremors or sweating noted. 15 minutes and PRN safety checks completed with no noted complains. Will continue to monitor.   0645- Pt score 7 on CIWA scale. No PRN advised at this time. Pt does states that nausea and other s/s worsen before breakfast. Will report to oncoming RN.

## 2019-10-20 NOTE — PLAN OF CARE
Problem: Adult Behavioral Health Plan of Care  Goal: Plan of Care Review  Outcome: Improving     Problem: Adult Behavioral Health Plan of Care  Goal: Patient-Specific Goal (Individualization)  Description  Pt will follow recommendations of treatment team.  Pt will be compliant with medications.  Pt will sleep 6-8 hours each nights.  Pt will attend 50 % of groups.     10/19/2019 1929 by Kashif Ortez RN  Outcome: Improving  Note:   Pt has been attending group and participating appropriately showing some insight into past addictive behavior with alcohol  Has had a score of 3 at 1600 Voicing concern over the state of her marriage  States some depression but denies SI Less fidgety  Admits to some anxiety       Problem: Adult Behavioral Health Plan of Care  Goal: Adheres to Safety Considerations for Self and Others  Outcome: Improving     Problem: Adult Behavioral Health Plan of Care  Goal: Optimized Coping Skills in Response to Life Stressors  Outcome: Improving

## 2019-10-20 NOTE — PLAN OF CARE
Face to face end of shift report obtained from KIM Rowland. Pt observed resting in bed.    CHAD DRISCOLL RN  10/20/2019  12:24 AM

## 2019-10-20 NOTE — PLAN OF CARE
"  Problem: Adult Behavioral Health Plan of Care  Goal: Patient-Specific Goal (Individualization)  Description  Pt will follow recommendations of treatment team.  Pt will be compliant with medications.  Pt will sleep 6-8 hours each nights.  Pt will attend 50 % of groups.     Patient somewhat isolative and withdrawn to her room, states she will try to be more social today, does eat meals in the lounge. Is anxious, and is tremulous, diaphoretic, and nauseous. Did score a 13 on the CIWA scale, did accept Zofran 4 mg and Valium 10 mg PO at 0810. States she also has a headache, states \"it feels like I have a rubber band around my head\". States \"my withdrawals are usually worse in the morning because I work nights, and get home in the morning and drink, my 5 am is my evening\". Has been appropriate with staff and peers.   1200-scored a 6 on the CIWA scale, did request and accept Atarax 50 mg for anxiety, states \"I just need something less than Valium\", is less tremulous, is not diaphoretic or nauseous. States she is a foodie and hopes to try more  foods that she could pair with teas, states \"it's not as fun, but I want to try it\".   1350-patient in bed, sleeping.     Face to face end of shift report communicated to [facility] shift RN.     Maryjane Acevedo RN  10/20/2019  2:39 PM          10/20/2019 1014 by Maryjane Acevedo RN  Outcome: No Change    Problem: Substance Withdrawal  Goal: Substance Withdrawal  Description  Pt will safely withdraw from alcohol during hospitalization.     Patient does exhibit withdrawal symptoms, has been safe.  10/20/2019 1014 by Maryjane Acevedo, RN  Outcome: No Change            "

## 2019-10-21 PROCEDURE — 12400000 ZZH R&B MH

## 2019-10-21 PROCEDURE — 25000128 H RX IP 250 OP 636: Performed by: NURSE PRACTITIONER

## 2019-10-21 PROCEDURE — 99232 SBSQ HOSP IP/OBS MODERATE 35: CPT | Performed by: NURSE PRACTITIONER

## 2019-10-21 PROCEDURE — 25000132 ZZH RX MED GY IP 250 OP 250 PS 637: Performed by: NURSE PRACTITIONER

## 2019-10-21 RX ORDER — LAMOTRIGINE 25 MG/1
25 TABLET ORAL DAILY
Status: DISCONTINUED | OUTPATIENT
Start: 2019-10-21 | End: 2019-10-30 | Stop reason: HOSPADM

## 2019-10-21 RX ADMIN — MULTIPLE VITAMINS W/ MINERALS TAB 1 TABLET: TAB at 08:56

## 2019-10-21 RX ADMIN — DIAZEPAM 10 MG: 5 TABLET ORAL at 09:16

## 2019-10-21 RX ADMIN — LAMOTRIGINE 25 MG: 25 TABLET ORAL at 12:20

## 2019-10-21 RX ADMIN — FOLIC ACID 1 MG: 1 TABLET ORAL at 08:56

## 2019-10-21 RX ADMIN — DIAZEPAM 10 MG: 5 TABLET ORAL at 06:31

## 2019-10-21 RX ADMIN — CLONIDINE HYDROCHLORIDE 0.1 MG: 0.1 TABLET ORAL at 08:56

## 2019-10-21 RX ADMIN — ONDANSETRON HYDROCHLORIDE 4 MG: 4 TABLET, FILM COATED ORAL at 06:30

## 2019-10-21 RX ADMIN — FLUOXETINE HYDROCHLORIDE 40 MG: 40 CAPSULE ORAL at 08:56

## 2019-10-21 RX ADMIN — DIAZEPAM 10 MG: 5 TABLET ORAL at 14:39

## 2019-10-21 RX ADMIN — DIAZEPAM 10 MG: 5 TABLET ORAL at 19:04

## 2019-10-21 RX ADMIN — CLONIDINE HYDROCHLORIDE 0.1 MG: 0.1 TABLET ORAL at 21:04

## 2019-10-21 ASSESSMENT — ACTIVITIES OF DAILY LIVING (ADL)
LAUNDRY: UNABLE TO COMPLETE
ORAL_HYGIENE: INDEPENDENT
ORAL_HYGIENE: INDEPENDENT
DRESS: SCRUBS (BEHAVIORAL HEALTH);INDEPENDENT
HYGIENE/GROOMING: INDEPENDENT
HYGIENE/GROOMING: INDEPENDENT

## 2019-10-21 NOTE — PLAN OF CARE
"Spoke with pt this afternoon- She was in the lounge working on a puzzle. Pt states she isn't doing very good today and her SI is very high- she contracts for safety. Informed pt's nurse. Says she is forcing herself to stay out of her room and work on the puzzle to distract herself. Pt says she is still in alcohol withdrawal as well. Pt states she plans on staying with her brother on discharge- he lives nearby and her family spent this past weekend working on building her an apartment in her brother's house- she describes them as \"extra.\" Pt says she has a hard time with this as she is very independent. Pt says they are starting a new med because they think she has bipolar disorder which she says \"sucks, but makes sense.\" Pt denies any other questions or concerns at this time.   "

## 2019-10-21 NOTE — PROGRESS NOTES
[facility]  Psychiatric Progress Note      Impression:   Abimbola continues to score on the CIWA. She feels a bit surprised by this but understands withdrawal symptoms can be present for up to two weeks. Abimbola is open to discuss ing her mood related symptoms. She reports mood lability. She endorses periods of time lasting up to two weeks of increased energy, decreased need for sleep, able to complete several tasks, hyper-focused. This is followed by a low mood, difficulty getting out of bed and maintaining daily activity. She drinks during both of these times, as a reward for accomplishing so much and as a sedative to numb the feeling of emptiness. She is unsure if this was a predicate for alcohol use or subsequent to the alcohol use. Abimbola is open to trying Lamictal as a mood stabilizer. She does verbalize an understanding of the risk of SJS.            DIagnoses:   Major Depressive Disorder, recurrent, severe without psychosis  Alcohol Abuse Disorder             Plan:   Start lamictal 25 mg daily  Continue CIWA    ELOS:  1-3 days for stabilization and diminish withdrawals      Attestation:  Patient has been seen and evaluated by me,  Bernie Hernandez NP          Interim History:   The patient's care was discussed with the treatment team and chart notes were reviewed.          Medications:     Current Facility-Administered Medications Ordered in Epic   Medication Dose Route Frequency Last Rate Last Dose     acetaminophen (TYLENOL) tablet 650 mg  650 mg Oral Q4H PRN   650 mg at 10/17/19 2209     cloNIDine (CATAPRES) tablet 0.1 mg  0.1 mg Oral BID   0.1 mg at 10/21/19 0856     diazepam (VALIUM) tablet 10 mg  10 mg Oral Q30 Min PRN   10 mg at 10/21/19 0916     FLUoxetine (PROzac) capsule 40 mg  40 mg Oral Daily   40 mg at 10/21/19 0856     folic acid (FOLVITE) tablet 1 mg  1 mg Oral Daily   1 mg at 10/21/19 0856     hydrOXYzine (ATARAX) tablet 25-50 mg  25-50 mg Oral Q4H PRN   50 mg at 10/20/19 1200      "lamoTRIgine (LaMICtal) tablet 25 mg  25 mg Oral Daily         magnesium hydroxide (MILK OF MAGNESIA) suspension 30 mL  30 mL Oral At Bedtime PRN         melatonin 3 mg (with vit B6 10 mg) extended release tablet 1 tablet  1 tablet Oral At Bedtime PRN         multivitamin w/minerals (THERA-VIT-M) tablet 1 tablet  1 tablet Oral Daily   1 tablet at 10/21/19 0856     nicotine (NICORETTE) gum 2-4 mg  2-4 mg Buccal Q1H PRN         OLANZapine (zyPREXA) tablet 10 mg  10 mg Oral Q8H PRN        Or     OLANZapine (zyPREXA) injection 10 mg  10 mg Intramuscular Q8H PRN         ondansetron (ZOFRAN) tablet 4 mg  4 mg Oral Q6H PRN   4 mg at 10/21/19 0630     traZODone (DESYREL) tablet 50 mg  50 mg Oral At Bedtime PRN   50 mg at 10/17/19 9502     No current Epic-ordered outpatient medications on file.          10 point ROS negative        Allergies:     Allergies   Allergen Reactions     Codeine             Psychiatric Examination:   /81   Pulse 88   Temp 97.6  F (36.4  C) (Tympanic)   Resp 14   Ht 1.753 m (5' 9\")   Wt 86.6 kg (191 lb)   SpO2 98%   BMI 28.21 kg/m    Weight is 191 lbs 0 oz  Body mass index is 28.21 kg/m .    Appearance:  awake, alert  Attitude:  cooperative  Eye Contact:  good  Mood:  depressed  Affect:  mood congruent  Speech:  clear, coherent  Psychomotor Behavior:  no evidence of tardive dyskinesia, dystonia, or tics  Thought Process:  logical and linear  Associations:  no loose associations  Thought Content:  no evidence of suicidal ideation or homicidal ideation and no evidence of psychotic thought  Insight:  good  Judgment:  good  Oriented to:  time, person, and place  Attention Span and Concentration:  intact  Recent and Remote Memory:  intact  Fund of Knowledge: appropriate  Muscle Strength and Tone: normal  Gait and Station: Normal           Labs:     Results for orders placed or performed during the hospital encounter of 10/14/19   UA reflex to Microscopic and Culture   Result Value Ref Range "    Color Urine Yellow     Appearance Urine Clear     Glucose Urine Negative NEG^Negative mg/dL    Bilirubin Urine Negative NEG^Negative    Ketones Urine Negative NEG^Negative mg/dL    Specific Gravity Urine 1.015 1.003 - 1.035    Blood Urine Trace (A) NEG^Negative    pH Urine 6.0 4.7 - 8.0 pH    Protein Albumin Urine Negative NEG^Negative mg/dL    Urobilinogen mg/dL Normal 0.0 - 2.0 mg/dL    Nitrite Urine Negative NEG^Negative    Leukocyte Esterase Urine Negative NEG^Negative    Source Midstream Urine     RBC Urine <1 0 - 2 /HPF    WBC Urine 2 0 - 5 /HPF    Bacteria Urine Few (A) NEG^Negative /HPF    Mucous Urine Present (A) NEG^Negative /LPF

## 2019-10-21 NOTE — PLAN OF CARE
Face to face end of shift report obtained from KIM Rowland. Pt observed sleeping in bed.     CHAD DRISCOLL RN  10/21/2019  12:09 AM

## 2019-10-21 NOTE — PLAN OF CARE
BEHAVIORAL TEAM DISCUSSION    Participants:  Bernie Hernandez NP, Kathy Quevedo LSW,  Odalis Ray LSW, Gatito Noguera LSW, Mirlande Cormier OT, Sania Hand OT, Regine Jones OT, Charles Molina RN, Gordon Moscoso RN   Progress: fair  Continued Stay Criteria/Rationale: still having anxiety and depression - still scoring on CIWA   Medical/Physical: withdrawal symptoms - CIWA  Precautions:   Falls precaution?: No       Behavioral Orders   Procedures    Code 1 - Restrict to Unit    Routine Programming       As clinically indicated    Status 15       Every 15 minutes.      Plan: NP to discuss starting lamictal today - work on discharge plan   Rationale for change in precautions or plan: None     Active Problems:    Suicidal ideation        Current Facility-Administered Medications:     acetaminophen (TYLENOL) tablet 650 mg, 650 mg, Oral, Q4H PRN, Sania Pinto APRN CNP, 650 mg at 10/17/19 2209    cloNIDine (CATAPRES) tablet 0.1 mg, 0.1 mg, Oral, BID, Sania Pinto APRN CNP, 0.1 mg at 10/18/19 0850    diazepam (VALIUM) tablet 10 mg, 10 mg, Oral, Q30 Min PRN, Sania Pinto APRN CNP, 10 mg at 10/18/19 0753    FLUoxetine (PROzac) capsule 40 mg, 40 mg, Oral, Daily, Sania Pinto APRN CNP, 40 mg at 10/18/19 0851    folic acid (FOLVITE) tablet 1 mg, 1 mg, Oral, Daily, Sania Pinto APRN CNP, 1 mg at 10/18/19 0851    hydrOXYzine (ATARAX) tablet 25-50 mg, 25-50 mg, Oral, Q4H PRN, Sania Pinto APRN CNP, 50 mg at 10/17/19 1816    magnesium hydroxide (MILK OF MAGNESIA) suspension 30 mL, 30 mL, Oral, At Bedtime PRN, Sania Pinto APRN CNP    melatonin 3 mg (with vit B6 10 mg) extended release tablet 1 tablet, 1 tablet, Oral, At Bedtime PRN, Sania Pinto APRN CNP    multivitamin w/minerals (THERA-VIT-M) tablet 1 tablet, 1 tablet, Oral, Daily, Sania Pinto, OBIE CNP, 1 tablet at 10/18/19 0851    nicotine (NICORETTE) gum 2-4 mg, 2-4 mg, Buccal, Q1H PRN, Blair,  OBIE Pérez CNP    OLANZapine (zyPREXA) tablet 10 mg, 10 mg, Oral, Q8H PRN **OR** OLANZapine (zyPREXA) injection 10 mg, 10 mg, Intramuscular, Q8H PRN, Sania Pinto APRN CNP    ondansetron (ZOFRAN) tablet 4 mg, 4 mg, Oral, Q6H PRN, Sania Pinto APRN CNP, 4 mg at 10/18/19 6396    traZODone (DESYREL) tablet 50 mg, 50 mg, Oral, At Bedtime PRN, Sania Pinto APRN CNP, 50 mg at 10/17/19 3373

## 2019-10-21 NOTE — PLAN OF CARE
Problem: Adult Behavioral Health Plan of Care  Goal: Patient-Specific Goal (Individualization)  Description  Pt will follow recommendations of treatment team.  Pt will be compliant with medications.  Pt will sleep 6-8 hours each nights.  Pt will attend 50 % of groups.     10/21/2019 1708 by Claudia Kendrick RN  Outcome: No Change  Note: Face to face end of shift report communicated to KIM Kendrick RN  10/21/2019  11:31 PM                     Problem: Adult Behavioral Health Plan of Care  Goal: Patient-Specific Goal (Individualization)  Description  Pt will follow recommendations of treatment team.  Pt will be compliant with medications.  Pt will sleep 6-8 hours each nights.  Pt will attend 50 % of groups.     10/21/2019 1708 by Claudia Kendrick RN  Outcome: No Change  Note: pt states she feels better going to group and prior valium. Headache 5/10 with no Tylenol request. States chronic suicidal thoughts with no intention to act. States depression and anxiety present but tolerable at this time.                Problem: Substance Withdrawal  Goal: Substance Withdrawal  Description  Pt will safely withdraw from alcohol during hospitalization.   10/21/2019 1708 by Claudia Kendrick, RN  Outcome: Improving   CIWA score 3. Minimal tremors with no sweating noticed. Will notify nurse if feelings are escalating.  1904- requested and received Valium 10 mg for CIWA score of 12. Pt vented her feelings with writer at length.   2100- pt states she feels real good. CIWA score 2

## 2019-10-21 NOTE — PLAN OF CARE
Face to face end of shift report will be communicated to oncoming RN.     Problem: Adult Behavioral Health Plan of Care  Goal: Patient-Specific Goal (Individualization)  Description  Pt will follow recommendations of treatment team.  Pt will be compliant with medications.  Pt will sleep 6-8 hours each nights.  Pt will attend 50 % of groups.     10/21/2019 0006 by Nadine Sage RN  Outcome: No Change     Problem: Substance Withdrawal  Goal: Substance Withdrawal  Description  Pt will safely withdraw from alcohol during hospitalization.   10/21/2019 0006 by Nadine Sage RN  Outcome: No Change   0000- Pt sleeping with no s/s of withdrawal. No tremors or visible sweating noted. Respirations regular. Will continue to monitor.   0400-Pt sleeping with no s/s of withdrawal. No tremors or visible sweating noted. Respirations regular. Will continue to monitor.   0628- Pt score 11 on CIWA score and nausea. Zofran 4 mg and Valium 10 mg given. Will continue to monitor.   0700- Pt reports nausea is improved. Will continue to monitor.

## 2019-10-22 PROCEDURE — 25000132 ZZH RX MED GY IP 250 OP 250 PS 637: Performed by: NURSE PRACTITIONER

## 2019-10-22 PROCEDURE — 12400000 ZZH R&B MH

## 2019-10-22 PROCEDURE — 99233 SBSQ HOSP IP/OBS HIGH 50: CPT | Performed by: NURSE PRACTITIONER

## 2019-10-22 PROCEDURE — 25000128 H RX IP 250 OP 636: Performed by: NURSE PRACTITIONER

## 2019-10-22 RX ORDER — ONDANSETRON 4 MG/1
4 TABLET, ORALLY DISINTEGRATING ORAL DAILY
Status: DISCONTINUED | OUTPATIENT
Start: 2019-10-23 | End: 2019-10-27

## 2019-10-22 RX ADMIN — CLONIDINE HYDROCHLORIDE 0.1 MG: 0.1 TABLET ORAL at 20:12

## 2019-10-22 RX ADMIN — CLONIDINE HYDROCHLORIDE 0.1 MG: 0.1 TABLET ORAL at 09:29

## 2019-10-22 RX ADMIN — MULTIPLE VITAMINS W/ MINERALS TAB 1 TABLET: TAB at 09:29

## 2019-10-22 RX ADMIN — FOLIC ACID 1 MG: 1 TABLET ORAL at 09:29

## 2019-10-22 RX ADMIN — DIAZEPAM 10 MG: 5 TABLET ORAL at 06:44

## 2019-10-22 RX ADMIN — DIAZEPAM 10 MG: 5 TABLET ORAL at 16:21

## 2019-10-22 RX ADMIN — ONDANSETRON HYDROCHLORIDE 4 MG: 4 TABLET, FILM COATED ORAL at 06:44

## 2019-10-22 RX ADMIN — LAMOTRIGINE 25 MG: 25 TABLET ORAL at 09:28

## 2019-10-22 RX ADMIN — DIAZEPAM 10 MG: 5 TABLET ORAL at 09:29

## 2019-10-22 RX ADMIN — DIAZEPAM 10 MG: 5 TABLET ORAL at 07:19

## 2019-10-22 RX ADMIN — FLUOXETINE HYDROCHLORIDE 40 MG: 40 CAPSULE ORAL at 09:28

## 2019-10-22 ASSESSMENT — ACTIVITIES OF DAILY LIVING (ADL)
HYGIENE/GROOMING: INDEPENDENT
ORAL_HYGIENE: INDEPENDENT
LAUNDRY: UNABLE TO COMPLETE
DRESS: SCRUBS (BEHAVIORAL HEALTH);INDEPENDENT
ORAL_HYGIENE: INDEPENDENT
HYGIENE/GROOMING: INDEPENDENT
DRESS: SCRUBS (BEHAVIORAL HEALTH)

## 2019-10-22 NOTE — PLAN OF CARE
Problem: Adult Behavioral Health Plan of Care  Goal: Patient-Specific Goal (Individualization)  Description  Pt will follow recommendations of treatment team.  Pt will be compliant with medications.  Pt will sleep 6-8 hours each nights.  Pt will attend 50 % of groups.     10/22/2019 1150 by Aleksandra Paez, RN  Outcome: No Change  Note:   Shift Summery:  Patient continues to have s/s of ETOH withdrawal this shift including tremors and high anxiety.  Patient received Valium 10 mg x2 prior to this shift today. Patient had no continued complaints of nausea/vomiting since this AMs episode.  Patient's affect is flat and at times tearful.  Patient admits to high depression and SI with no stated plan or intent.  Patient states she will not harm self.  Patient is upset r/t a phone call received from her mother.  Her dogs will need to be  because they are fighting.  She is angry with her  because she thinks he is not following the dog's training regimen.  Patient is cooperative with assessment and medications this shift.    0929:  PRN Valium 10 mg po administered for CIWA score 12.  Patient states this dose of Valium greatly decreased her anxiety but is now tired.  Patient is resting in bed.    1030:  CIWA score 7.  1130: CIWA score 5.  Patient awake and in lounge for lunch.  Patient was in groups this afternoon.           Problem: Substance Withdrawal  Goal: Substance Withdrawal  Description  Pt will safely withdraw from alcohol during hospitalization.   10/22/2019 1150 by Aleksandra Paez, RN  Outcome: No Change  Flowsheets (Taken 10/22/2019 1150)  Substance Withdrawal Present: mood; anxiety; suicidality; affect  Note:   Patient continues to score on CIWA scale.  Patient has hand tremors and anxiety this shift.      Will continue to monitor.

## 2019-10-22 NOTE — PLAN OF CARE
Face to face end of shift report will be communicated to oncoming RN.     Problem: Adult Behavioral Health Plan of Care  Goal: Patient-Specific Goal (Individualization)  Description  Pt will follow recommendations of treatment team.  Pt will be compliant with medications.  Pt will sleep 6-8 hours each nights.  Pt will attend 50 % of groups.     10/22/2019 0051 by Nadine Sage RN  Outcome: No Change     Problem: Substance Withdrawal  Goal: Substance Withdrawal  Description  Pt will safely withdraw from alcohol during hospitalization.   10/22/2019 0051 by Nadine Sage RN  Outcome: No Change   0000- Pt sleeping. No tremors or sweating noted. Will continue to monitor.   0400- Pt sleeping. No tremors or sweating noted. Will continue to monitor.   0644- Pt score 17 on CIWA scale. Pt vomited, is sweaty and tremelous. Pt c/o mild headache. Zofran 4 mg and Valium 10 mg given. Pt states she vomited bile sprinkled with bright red blood. No bleeding noted on oral cavity. VSS and within parameters. Will continue to monitor.   0719- CIWA score 9. Valium 10 mg given. Will continue to monitor.

## 2019-10-22 NOTE — PLAN OF CARE
Face to face end of shift report obtained from KIM Taylor. Pt observed resting in bed.    CHAD DRISCOLL RN  10/22/2019  12:24 AM

## 2019-10-22 NOTE — PLAN OF CARE
Spoke with pt's mom Mary this morning- SORIN signed- She states she want to get some things in place when pt gets home. Mary states she is going to need to take a couple weeks off of work and will be faxing over something from [employer] for the NP to sign so she is able to care for pt during those two weeks. Mary states she still thinks she needs inpt treatment and has been trying to pursue this with pt but has been unsuccessful. Mary states they built pt a room in her brother's house over the weekend- Got her a weighted blanket, made an art room, and got a mood lamp. Mary states she had to tell pt some bad news last night since her 2 dogs have been aggressive with each other- when she is discharged and staying with her brother she will only be able to keep one of the dogs and pt didn't handle the news well. Mary requests to have staff or pt contact her one day prior to discharge so she is able to give her job notice.

## 2019-10-22 NOTE — PLAN OF CARE
Problem: Adult Behavioral Health Plan of Care  Goal: Patient-Specific Goal (Individualization)  Description  Pt will follow recommendations of treatment team.  Pt will be compliant with medications.  Pt will sleep 6-8 hours each nights.  Pt will attend 50 % of groups.     10/22/2019 1635 by Gladys Nuñez, RN  Outcome: No Change  Note:   Pt. Willing to follow treatment team recommendations, compliant with taking prescribed medications, slept 6 hours last night, has attended at least 50% of group therapy, social with peers and staff. Endorses increased suicidal ideation today, denies feeling to act on this, verbally contracts for safety, able to let needs be known, talking at length with roommate, getting along well.         Problem: Substance Withdrawal  Goal: Substance Withdrawal  Description  Pt will safely withdraw from alcohol during hospitalization.   10/22/2019 1635 by Gladys Nuñez, RN  Outcome: No Change   Pt. Continues to score on the CIWA scale for alcohol withdrawal, medicated with valium 10 mg po at 1621 for scoring a 12 on the CIWA scale.  Face to face end of shift report will be communicated to oncoming night shift RN.     Gladys Nuñez, RN  10/22/2019  6:39 PM

## 2019-10-22 NOTE — PLAN OF CARE
BEHAVIORAL TEAM DISCUSSION     Participants:  Bernie Hernandez NP, Kathy Quevedo LSW,  Odalis Ray LSW, Gatito Noguera LSW, Mirlande Cormier OT, Sania Hand OT, Regine Jones OT, Amanda Herrera RN, Gordon oMscoso RN  Progress: fair  Continued Stay Criteria/Rationale: still having anxiety, depression and SI  - still scoring on CIWA   Medical/Physical: withdrawal symptoms - CIWA- scheduled Zofran   Precautions:   Falls precaution?: No          Behavioral Orders   Procedures    Code 1 - Restrict to Unit    Routine Programming       As clinically indicated    Status 15       Every 15 minutes.      Plan: Started Lamictal yesterday, discharge home with family and resources   Rationale for change in precautions or plan: None     Active Problems:    Suicidal ideation        Current Facility-Administered Medications:     acetaminophen (TYLENOL) tablet 650 mg, 650 mg, Oral, Q4H PRN, Sania Pinto APRN CNP, 650 mg at 10/17/19 2209    cloNIDine (CATAPRES) tablet 0.1 mg, 0.1 mg, Oral, BID, WoSania robles APRN CNP, 0.1 mg at 10/18/19 0850    diazepam (VALIUM) tablet 10 mg, 10 mg, Oral, Q30 Min PRN, Sania Pinto APRTOMI CNP, 10 mg at 10/18/19 0753    FLUoxetine (PROzac) capsule 40 mg, 40 mg, Oral, Daily, WoynespernSania APRN CNP, 40 mg at 10/18/19 0851    folic acid (FOLVITE) tablet 1 mg, 1 mg, Oral, Daily, WoSania robles APRN CNP, 1 mg at 10/18/19 0851    hydrOXYzine (ATARAX) tablet 25-50 mg, 25-50 mg, Oral, Q4H PRN, Sania Pinto APRTOMI CNP, 50 mg at 10/17/19 1816    magnesium hydroxide (MILK OF MAGNESIA) suspension 30 mL, 30 mL, Oral, At Bedtime PRN, Sania Pinto APRN CNP    melatonin 3 mg (with vit B6 10 mg) extended release tablet 1 tablet, 1 tablet, Oral, At Bedtime PRN, Sania Pinto APRN CNP    multivitamin w/minerals (THERA-VIT-M) tablet 1 tablet, 1 tablet, Oral, Daily, Sania Pinto APRN CNP, 1 tablet at 10/18/19 0851    nicotine (NICORETTE) gum 2-4  mg, 2-4 mg, Buccal, Q1H PRN, Sania Pinto APRN CNP    OLANZapine (zyPREXA) tablet 10 mg, 10 mg, Oral, Q8H PRN **OR** OLANZapine (zyPREXA) injection 10 mg, 10 mg, Intramuscular, Q8H PRN, Sania Pinto APRN CNP    ondansetron (ZOFRAN) tablet 4 mg, 4 mg, Oral, Q6H PRN, Sania Pinto APRN CNP, 4 mg at 10/18/19 8427    traZODone (DESYREL) tablet 50 mg, 50 mg, Oral, At Bedtime PRN, Sania Pinto APRTOMI CNP, 50 mg at 10/17/19 9592

## 2019-10-22 NOTE — PROGRESS NOTES
[facility]  Psychiatric Progress Note      Impression:   Abimbola continues to score on the CIWA, she has emesis each morning and recently reported some bright red blood in her emesis. Will have staff continue to monitor. Abimbola was feeling more suicidal yesterday than she has been previously. However discussed the possibility of a bipolar diagnosis and started treatment for this yesterday and she was not very happy about this information. She still has some suicidal ideations with no plan. She would like scheduled morning zofran as she has overwhelming nausea in the morning.             DIagnoses:   Major Depressive Disorder, recurrent, severe without psychosis  Alcohol Abuse Disorder             Plan:   Schedule Zofran in the morning  Continue CIWA    ELOS:  1-3 days for stabilization and diminish withdrawals      Attestation:  Patient has been seen and evaluated by me,  Bernie Hernandez NP          Interim History:   The patient's care was discussed with the treatment team and chart notes were reviewed.          Medications:     Current Facility-Administered Medications Ordered in Epic   Medication Dose Route Frequency Last Rate Last Dose     acetaminophen (TYLENOL) tablet 650 mg  650 mg Oral Q4H PRN   650 mg at 10/17/19 2209     cloNIDine (CATAPRES) tablet 0.1 mg  0.1 mg Oral BID   0.1 mg at 10/21/19 2104     diazepam (VALIUM) tablet 10 mg  10 mg Oral Q30 Min PRN   10 mg at 10/22/19 0719     FLUoxetine (PROzac) capsule 40 mg  40 mg Oral Daily   40 mg at 10/21/19 0856     folic acid (FOLVITE) tablet 1 mg  1 mg Oral Daily   1 mg at 10/21/19 0856     hydrOXYzine (ATARAX) tablet 25-50 mg  25-50 mg Oral Q4H PRN   50 mg at 10/20/19 1200     lamoTRIgine (LaMICtal) tablet 25 mg  25 mg Oral Daily   25 mg at 10/21/19 1220     magnesium hydroxide (MILK OF MAGNESIA) suspension 30 mL  30 mL Oral At Bedtime PRN         melatonin 3 mg (with vit B6 10 mg) extended release tablet 1 tablet  1 tablet Oral At Bedtime  "PRN         multivitamin w/minerals (THERA-VIT-M) tablet 1 tablet  1 tablet Oral Daily   1 tablet at 10/21/19 0856     nicotine (NICORETTE) gum 2-4 mg  2-4 mg Buccal Q1H PRN         OLANZapine (zyPREXA) tablet 10 mg  10 mg Oral Q8H PRN        Or     OLANZapine (zyPREXA) injection 10 mg  10 mg Intramuscular Q8H PRN         ondansetron (ZOFRAN) tablet 4 mg  4 mg Oral Q6H PRN   4 mg at 10/22/19 0644     traZODone (DESYREL) tablet 50 mg  50 mg Oral At Bedtime PRN   50 mg at 10/17/19 2215     No current Epic-ordered outpatient medications on file.          10 point ROS negative        Allergies:     Allergies   Allergen Reactions     Codeine             Psychiatric Examination:   /70   Pulse 63   Temp 98.4  F (36.9  C) (Tympanic)   Resp 14   Ht 1.753 m (5' 9\")   Wt 86.6 kg (191 lb)   SpO2 97%   BMI 28.21 kg/m    Weight is 191 lbs 0 oz  Body mass index is 28.21 kg/m .    Appearance:  awake, alert  Attitude:  cooperative  Eye Contact:  good  Mood:  depressed  Affect:  mood congruent  Speech:  clear, coherent  Psychomotor Behavior:  no evidence of tardive dyskinesia, dystonia, or tics  Thought Process:  logical and linear  Associations:  no loose associations  Thought Content:  no evidence of suicidal ideation or homicidal ideation and no evidence of psychotic thought  Insight:  good  Judgment:  good  Oriented to:  time, person, and place  Attention Span and Concentration:  intact  Recent and Remote Memory:  intact  Fund of Knowledge: appropriate  Muscle Strength and Tone: normal  Gait and Station: Normal           Labs:     Results for orders placed or performed during the hospital encounter of 10/14/19   UA reflex to Microscopic and Culture   Result Value Ref Range    Color Urine Yellow     Appearance Urine Clear     Glucose Urine Negative NEG^Negative mg/dL    Bilirubin Urine Negative NEG^Negative    Ketones Urine Negative NEG^Negative mg/dL    Specific Gravity Urine 1.015 1.003 - 1.035    Blood Urine Trace " (A) NEG^Negative    pH Urine 6.0 4.7 - 8.0 pH    Protein Albumin Urine Negative NEG^Negative mg/dL    Urobilinogen mg/dL Normal 0.0 - 2.0 mg/dL    Nitrite Urine Negative NEG^Negative    Leukocyte Esterase Urine Negative NEG^Negative    Source Midstream Urine     RBC Urine <1 0 - 2 /HPF    WBC Urine 2 0 - 5 /HPF    Bacteria Urine Few (A) NEG^Negative /HPF    Mucous Urine Present (A) NEG^Negative /LPF

## 2019-10-23 LAB
ALBUMIN SERPL-MCNC: 3.8 G/DL (ref 3.4–5)
ALP SERPL-CCNC: 72 U/L (ref 40–150)
ALT SERPL W P-5'-P-CCNC: 40 U/L (ref 0–50)
AMMONIA PLAS-SCNC: 26 UMOL/L (ref 10–50)
AST SERPL W P-5'-P-CCNC: 18 U/L (ref 0–45)
BASOPHILS # BLD AUTO: 0 10E9/L (ref 0–0.2)
BASOPHILS NFR BLD AUTO: 0.6 %
BILIRUB DIRECT SERPL-MCNC: 0.2 MG/DL (ref 0–0.2)
BILIRUB SERPL-MCNC: 0.5 MG/DL (ref 0.2–1.3)
DIFFERENTIAL METHOD BLD: NORMAL
EOSINOPHIL # BLD AUTO: 0.2 10E9/L (ref 0–0.7)
EOSINOPHIL NFR BLD AUTO: 2.6 %
ERYTHROCYTE [DISTWIDTH] IN BLOOD BY AUTOMATED COUNT: 12.6 % (ref 10–15)
HCT VFR BLD AUTO: 41.1 % (ref 35–47)
HGB BLD-MCNC: 13.5 G/DL (ref 11.7–15.7)
IMM GRANULOCYTES # BLD: 0 10E9/L (ref 0–0.4)
IMM GRANULOCYTES NFR BLD: 0.2 %
LYMPHOCYTES # BLD AUTO: 2.3 10E9/L (ref 0.8–5.3)
LYMPHOCYTES NFR BLD AUTO: 35.9 %
MCH RBC QN AUTO: 31.2 PG (ref 26.5–33)
MCHC RBC AUTO-ENTMCNC: 32.8 G/DL (ref 31.5–36.5)
MCV RBC AUTO: 95 FL (ref 78–100)
MONOCYTES # BLD AUTO: 0.5 10E9/L (ref 0–1.3)
MONOCYTES NFR BLD AUTO: 7.7 %
NEUTROPHILS # BLD AUTO: 3.3 10E9/L (ref 1.6–8.3)
NEUTROPHILS NFR BLD AUTO: 53 %
NRBC # BLD AUTO: 0 10*3/UL
NRBC BLD AUTO-RTO: 0 /100
PLATELET # BLD AUTO: 250 10E9/L (ref 150–450)
PROT SERPL-MCNC: 7.3 G/DL (ref 6.8–8.8)
RBC # BLD AUTO: 4.33 10E12/L (ref 3.8–5.2)
WBC # BLD AUTO: 6.3 10E9/L (ref 4–11)

## 2019-10-23 PROCEDURE — 99232 SBSQ HOSP IP/OBS MODERATE 35: CPT | Performed by: NURSE PRACTITIONER

## 2019-10-23 PROCEDURE — 12400000 ZZH R&B MH

## 2019-10-23 PROCEDURE — 25000128 H RX IP 250 OP 636: Performed by: NURSE PRACTITIONER

## 2019-10-23 PROCEDURE — 36415 COLL VENOUS BLD VENIPUNCTURE: CPT | Performed by: NURSE PRACTITIONER

## 2019-10-23 PROCEDURE — 80076 HEPATIC FUNCTION PANEL: CPT | Performed by: NURSE PRACTITIONER

## 2019-10-23 PROCEDURE — 85025 COMPLETE CBC W/AUTO DIFF WBC: CPT | Performed by: NURSE PRACTITIONER

## 2019-10-23 PROCEDURE — 25000132 ZZH RX MED GY IP 250 OP 250 PS 637: Performed by: NURSE PRACTITIONER

## 2019-10-23 PROCEDURE — 82140 ASSAY OF AMMONIA: CPT | Performed by: NURSE PRACTITIONER

## 2019-10-23 RX ADMIN — DIAZEPAM 10 MG: 5 TABLET ORAL at 16:44

## 2019-10-23 RX ADMIN — CLONIDINE HYDROCHLORIDE 0.1 MG: 0.1 TABLET ORAL at 09:17

## 2019-10-23 RX ADMIN — FLUOXETINE HYDROCHLORIDE 40 MG: 40 CAPSULE ORAL at 09:17

## 2019-10-23 RX ADMIN — LAMOTRIGINE 25 MG: 25 TABLET ORAL at 09:17

## 2019-10-23 RX ADMIN — DIAZEPAM 10 MG: 5 TABLET ORAL at 20:03

## 2019-10-23 RX ADMIN — DIAZEPAM 10 MG: 5 TABLET ORAL at 07:25

## 2019-10-23 RX ADMIN — DIAZEPAM 10 MG: 5 TABLET ORAL at 09:17

## 2019-10-23 RX ADMIN — MULTIPLE VITAMINS W/ MINERALS TAB 1 TABLET: TAB at 09:17

## 2019-10-23 RX ADMIN — ONDANSETRON HYDROCHLORIDE 4 MG: 4 TABLET, FILM COATED ORAL at 11:53

## 2019-10-23 RX ADMIN — FOLIC ACID 1 MG: 1 TABLET ORAL at 09:17

## 2019-10-23 RX ADMIN — DIAZEPAM 10 MG: 5 TABLET ORAL at 05:25

## 2019-10-23 RX ADMIN — ONDANSETRON 4 MG: 4 TABLET, ORALLY DISINTEGRATING ORAL at 05:20

## 2019-10-23 RX ADMIN — CLONIDINE HYDROCHLORIDE 0.1 MG: 0.1 TABLET ORAL at 20:03

## 2019-10-23 RX ADMIN — DIAZEPAM 10 MG: 5 TABLET ORAL at 12:06

## 2019-10-23 ASSESSMENT — ACTIVITIES OF DAILY LIVING (ADL)
ORAL_HYGIENE: INDEPENDENT
HYGIENE/GROOMING: INDEPENDENT
DRESS: SCRUBS (BEHAVIORAL HEALTH)

## 2019-10-23 NOTE — PROGRESS NOTES
"[facility]  Psychiatric Progress Note      Impression:     Patient has had significant alcohol withdrawal, continues to score on CIWA.  Zofran is now scheduled for mornings as well as prn, patient took dose around 5 am this morning as she was up early and did not vomit today.  She reports however, not feeling very energetic and states she seems to fluctuate between having motivation and going to groups, to apathy and \"not wanting to exist\".  She endorses suicidal ideation, however these thoughts appear to be more like hopeless and passive, she denies having plan or intent.  Patient reports having dreams in which she is drinking and when waking, she states \"I can even taste it\".  We explore medication such as Naltrexone, minipress - patient declines wanting anything like that as she feels it would make her feel more nauseous.  Patient has not made a decision regarding going to CD treatment yet, she states \"as soon as I have one good day free from withdrawal and can think straight, I will decide\".             DIagnoses:   Major Depressive Disorder, recurrent, severe without psychosis  Alcohol Abuse Disorder             Plan:   Schedule Zofran in the morning  Continue CIWA    ELOS:  >5 days for stabilization and diminish withdrawals      Attestation:  Patient has been seen and evaluated by me,  Sania Pinto, OBIE PA          Interim History:   The patient's care was discussed with the treatment team and chart notes were reviewed.          Medications:     Current Facility-Administered Medications Ordered in Epic   Medication Dose Route Frequency Last Rate Last Dose     acetaminophen (TYLENOL) tablet 650 mg  650 mg Oral Q4H PRN   650 mg at 10/17/19 2209     cloNIDine (CATAPRES) tablet 0.1 mg  0.1 mg Oral BID   0.1 mg at 10/23/19 0917     diazepam (VALIUM) tablet 10 mg  10 mg Oral Q30 Min PRN   10 mg at 10/23/19 0917     FLUoxetine (PROzac) capsule 40 mg  40 mg Oral Daily   40 mg at 10/23/19 0917 " "    folic acid (FOLVITE) tablet 1 mg  1 mg Oral Daily   1 mg at 10/23/19 0917     hydrOXYzine (ATARAX) tablet 25-50 mg  25-50 mg Oral Q4H PRN   50 mg at 10/20/19 1200     lamoTRIgine (LaMICtal) tablet 25 mg  25 mg Oral Daily   25 mg at 10/23/19 0917     magnesium hydroxide (MILK OF MAGNESIA) suspension 30 mL  30 mL Oral At Bedtime PRN         melatonin 3 mg (with vit B6 10 mg) extended release tablet 1 tablet  1 tablet Oral At Bedtime PRN         multivitamin w/minerals (THERA-VIT-M) tablet 1 tablet  1 tablet Oral Daily   1 tablet at 10/23/19 0917     nicotine (NICORETTE) gum 2-4 mg  2-4 mg Buccal Q1H PRN         OLANZapine (zyPREXA) tablet 10 mg  10 mg Oral Q8H PRN        Or     OLANZapine (zyPREXA) injection 10 mg  10 mg Intramuscular Q8H PRN         ondansetron (ZOFRAN) tablet 4 mg  4 mg Oral Q6H PRN   4 mg at 10/22/19 0644     ondansetron (ZOFRAN-ODT) ODT tab 4 mg  4 mg Oral Daily   4 mg at 10/23/19 0520     traZODone (DESYREL) tablet 50 mg  50 mg Oral At Bedtime PRN   50 mg at 10/17/19 2884     No current Epic-ordered outpatient medications on file.          10 point ROS negative        Allergies:     Allergies   Allergen Reactions     Codeine             Psychiatric Examination:   /77   Pulse 106   Temp 97.2  F (36.2  C) (Tympanic)   Resp 16   Ht 1.753 m (5' 9\")   Wt 86.6 kg (191 lb)   SpO2 98%   BMI 28.21 kg/m    Weight is 191 lbs 0 oz  Body mass index is 28.21 kg/m .    Appearance:  awake, alert  Attitude:  cooperative  Eye Contact:  good  Mood:  depressed  Affect:  mood congruent  Speech:  clear, coherent  Psychomotor Behavior:  no evidence of tardive dyskinesia, dystonia, or tics  Thought Process:  logical and linear  Associations:  no loose associations  Thought Content:  no evidence of suicidal ideation or homicidal ideation and no evidence of psychotic thought  Insight:  good  Judgment:  good  Oriented to:  time, person, and place  Attention Span and Concentration:  intact  Recent and " Remote Memory:  intact  Fund of Knowledge: appropriate  Muscle Strength and Tone: normal  Gait and Station: Normal           Labs:     Results for orders placed or performed during the hospital encounter of 10/14/19   UA reflex to Microscopic and Culture   Result Value Ref Range    Color Urine Yellow     Appearance Urine Clear     Glucose Urine Negative NEG^Negative mg/dL    Bilirubin Urine Negative NEG^Negative    Ketones Urine Negative NEG^Negative mg/dL    Specific Gravity Urine 1.015 1.003 - 1.035    Blood Urine Trace (A) NEG^Negative    pH Urine 6.0 4.7 - 8.0 pH    Protein Albumin Urine Negative NEG^Negative mg/dL    Urobilinogen mg/dL Normal 0.0 - 2.0 mg/dL    Nitrite Urine Negative NEG^Negative    Leukocyte Esterase Urine Negative NEG^Negative    Source Midstream Urine     RBC Urine <1 0 - 2 /HPF    WBC Urine 2 0 - 5 /HPF    Bacteria Urine Few (A) NEG^Negative /HPF    Mucous Urine Present (A) NEG^Negative /LPF   Ammonia   Result Value Ref Range    Ammonia 26 10 - 50 umol/L   Hepatic panel   Result Value Ref Range    Bilirubin Direct 0.2 0.0 - 0.2 mg/dL    Bilirubin Total 0.5 0.2 - 1.3 mg/dL    Albumin 3.8 3.4 - 5.0 g/dL    Protein Total 7.3 6.8 - 8.8 g/dL    Alkaline Phosphatase 72 40 - 150 U/L    ALT 40 0 - 50 U/L    AST 18 0 - 45 U/L   CBC with platelets differential   Result Value Ref Range    WBC 6.3 4.0 - 11.0 10e9/L    RBC Count 4.33 3.8 - 5.2 10e12/L    Hemoglobin 13.5 11.7 - 15.7 g/dL    Hematocrit 41.1 35.0 - 47.0 %    MCV 95 78 - 100 fl    MCH 31.2 26.5 - 33.0 pg    MCHC 32.8 31.5 - 36.5 g/dL    RDW 12.6 10.0 - 15.0 %    Platelet Count 250 150 - 450 10e9/L    Diff Method Automated Method     % Neutrophils 53.0 %    % Lymphocytes 35.9 %    % Monocytes 7.7 %    % Eosinophils 2.6 %    % Basophils 0.6 %    % Immature Granulocytes 0.2 %    Nucleated RBCs 0 0 /100    Absolute Neutrophil 3.3 1.6 - 8.3 10e9/L    Absolute Lymphocytes 2.3 0.8 - 5.3 10e9/L    Absolute Monocytes 0.5 0.0 - 1.3 10e9/L    Absolute  Eosinophils 0.2 0.0 - 0.7 10e9/L    Absolute Basophils 0.0 0.0 - 0.2 10e9/L    Abs Immature Granulocytes 0.0 0 - 0.4 10e9/L    Absolute Nucleated RBC 0.0

## 2019-10-23 NOTE — PLAN OF CARE
Face to face end of shift report obtained from KIM Graham. Pt observed resting in bed.    CHAD DRISCOLL RN  10/23/2019  12:33 AM

## 2019-10-23 NOTE — PLAN OF CARE
BEHAVIORAL TEAM DISCUSSION     Participants:  Sania Pinto NP, Kathy Quevedo LSW,  Odalis Ray LSW, Gatito Noguera LSW, Mirlande Cormier OT, Sania Hand OT, Regine Jones OT  Progress: fair, still withdrawing  Continued Stay Criteria/Rationale: still having anxiety, depression and SI  - still scoring on CIWA   Medical/Physical: withdrawal symptoms - CIWA- scheduled Zofran   Precautions:   Falls precaution?: No          Behavioral Orders   Procedures     Code 1 - Restrict to Unit     Routine Programming       As clinically indicated     Status 15       Every 15 minutes.      Plan: discharge home with family and resources when stabilized. Labs ordered  Rationale for change in precautions or plan: None     Active Problems:    Suicidal ideation        Current Facility-Administered Medications:      acetaminophen (TYLENOL) tablet 650 mg, 650 mg, Oral, Q4H PRN, Sania Pinto APRN CNP, 650 mg at 10/17/19 2209     cloNIDine (CATAPRES) tablet 0.1 mg, 0.1 mg, Oral, BID, Sania Pinto APRN CNP, 0.1 mg at 10/23/19 0917     diazepam (VALIUM) tablet 10 mg, 10 mg, Oral, Q30 Min PRN, Sania Pinto APRN CNP, 10 mg at 10/23/19 0917     FLUoxetine (PROzac) capsule 40 mg, 40 mg, Oral, Daily, Sania Pinto APRN CNP, 40 mg at 10/23/19 0917     folic acid (FOLVITE) tablet 1 mg, 1 mg, Oral, Daily, Sania Pinto APRN CNP, 1 mg at 10/23/19 0917     hydrOXYzine (ATARAX) tablet 25-50 mg, 25-50 mg, Oral, Q4H PRN, Sania Pinto APRN CNP, 50 mg at 10/20/19 1200     lamoTRIgine (LaMICtal) tablet 25 mg, 25 mg, Oral, Daily, Bernie Hernandez, NP, 25 mg at 10/23/19 0917     magnesium hydroxide (MILK OF MAGNESIA) suspension 30 mL, 30 mL, Oral, At Bedtime PRN, Sania Pinto APRN CNP     melatonin 3 mg (with vit B6 10 mg) extended release tablet 1 tablet, 1 tablet, Oral, At Bedtime PRN, Sania Pinto, OBIE PA     multivitamin w/minerals (THERA-VIT-M) tablet 1 tablet, 1 tablet,  Oral, Daily, Sania Pinto APRN CNP, 1 tablet at 10/23/19 0917     nicotine (NICORETTE) gum 2-4 mg, 2-4 mg, Buccal, Q1H PRN, Sania Pinto APRN CNP     OLANZapine (zyPREXA) tablet 10 mg, 10 mg, Oral, Q8H PRN **OR** OLANZapine (zyPREXA) injection 10 mg, 10 mg, Intramuscular, Q8H PRN, Sania Pinto APRN CNP     ondansetron (ZOFRAN) tablet 4 mg, 4 mg, Oral, Q6H PRN, Sania Pinto APRN CNP, 4 mg at 10/22/19 0644     ondansetron (ZOFRAN-ODT) ODT tab 4 mg, 4 mg, Oral, Daily, Bernie Hernandez, NP, 4 mg at 10/23/19 0520     traZODone (DESYREL) tablet 50 mg, 50 mg, Oral, At Bedtime PRN, Sania Pinto APRN CNP, 50 mg at 10/17/19 7024

## 2019-10-23 NOTE — PLAN OF CARE
"  Problem: Adult Behavioral Health Plan of Care  Goal: Patient-Specific Goal (Individualization)  Description  Pt will follow recommendations of treatment team.  Pt will be compliant with medications.  Pt will sleep 6-8 hours each nights.  Pt will attend 50 % of groups.     10/23/2019 1124 by Aleksandra Paez RN  Note:   Patient is isolative, withdrawn, and depressed this AM.  She stated today is not a good day in regard to her mood.  She was in bed at start of shift. Patient did not attend community group as she usually does.  She state she can feel herself \"shutting down\" regarding emotions and depression.  When depression is high patient knows she isolates.  She continues to have s/s of ETOH withdrawal.  Reports poor appetite for breakfast and requested PRN Zofran prior to lunch.  She has continued c/o high anxiety.        0917:  PRN Valium 10 mg po administered for CIWA score of 9.      Problem: Substance Withdrawal  Goal: Substance Withdrawal  Description  Pt will safely withdraw from alcohol during hospitalization.   Patient continues to score for CIWA, has tremors, and has nausea.       "

## 2019-10-23 NOTE — PLAN OF CARE
Face to face end of shift report will be communicated to oncoming RN.    Problem: Adult Behavioral Health Plan of Care  Goal: Patient-Specific Goal (Individualization)  Description  Pt will follow recommendations of treatment team.  Pt will be compliant with medications.  Pt will sleep 6-8 hours each nights.  Pt will attend 50 % of groups.     10/23/2019 0424 by Nadine Sage RN  Outcome: No Change     Problem: Substance Withdrawal  Goal: Substance Withdrawal  Description  Pt will safely withdraw from alcohol during hospitalization.   10/23/2019 0424 by Nadine Sage RN  Outcome: Change based on patient need/priority   0000- Pt sleeping with no noted tremors or sweat. Will continue to monitor.  0415- Pt sleeping with no noted tremors or sweat.   0522- Pt awake, stated she had a nightmare about her drinking out of here. Pt depressed and feels discouraged.  Requested to have 1:1 with [facility] Edward to talk about crabings. Scheduled Zofran given as order. CIWA score of 11.  Valium 10 mg given will continue to monitor.   0725- CIWA score 10.  Pt continues to report nausea, anxiety. Pt is tremulous and slightly sweaty. Valium 10 mg given. Will continue to monitor.
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
## 2019-10-23 NOTE — PLAN OF CARE
"  Problem: Adult Behavioral Health Plan of Care  Goal: Patient-Specific Goal (Individualization)  Description  Pt will follow recommendations of treatment team.  Pt will be compliant with medications.  Pt will sleep 6-8 hours each nights.  Pt will attend 50 % of groups.     10/23/2019 1725 by Gladys Nuñez RN  Outcome: No Change  Note:   Pt. Is willing to follow treatment team recommendations, compliant with taking prescribed medications, slept 6 hours last night, has not attended any group therapy sessions yet this shift, states \"This is the worst day yet, I feel terrible\", pt. Has been scoring on the CIWA scale for alcohol withdrawal, scored a 10 at 1640, medicated with valium 10 mg po per protocol. 2003-  Pt. Scored a 9 on the CIWA scale, medicated with valium 10 mg po for alcohol withdrawal symptoms.       Problem: Adult Behavioral Health Plan of Care  Goal: Optimized Coping Skills in Response to Life Stressors  Outcome: No Change   Pt. Unable to state any coping skills at this time, states \"I'll think about it later when I feel better\"  Problem: Adult Behavioral Health Plan of Care  Goal: Develops/Participates in Therapeutic [place] to Support Successful Transition  Outcome: No Change   Pt. Willing to take prescribed medications, does not want to go to inpatient treatment program.  Face to face end of shift report will be communicated to oncoming night shift RN.     Gladys Nuñez RN  10/23/2019  8:27 PM        "

## 2019-10-24 PROCEDURE — 25000132 ZZH RX MED GY IP 250 OP 250 PS 637: Performed by: NURSE PRACTITIONER

## 2019-10-24 PROCEDURE — 25000128 H RX IP 250 OP 636: Performed by: NURSE PRACTITIONER

## 2019-10-24 PROCEDURE — 12400000 ZZH R&B MH

## 2019-10-24 PROCEDURE — 99232 SBSQ HOSP IP/OBS MODERATE 35: CPT | Performed by: NURSE PRACTITIONER

## 2019-10-24 RX ADMIN — FOLIC ACID 1 MG: 1 TABLET ORAL at 08:50

## 2019-10-24 RX ADMIN — LAMOTRIGINE 25 MG: 25 TABLET ORAL at 08:50

## 2019-10-24 RX ADMIN — DIAZEPAM 10 MG: 5 TABLET ORAL at 01:55

## 2019-10-24 RX ADMIN — ONDANSETRON 4 MG: 4 TABLET, ORALLY DISINTEGRATING ORAL at 05:06

## 2019-10-24 RX ADMIN — FLUOXETINE HYDROCHLORIDE 40 MG: 40 CAPSULE ORAL at 08:50

## 2019-10-24 RX ADMIN — HYDROXYZINE HYDROCHLORIDE 50 MG: 25 TABLET ORAL at 12:37

## 2019-10-24 RX ADMIN — CLONIDINE HYDROCHLORIDE 0.1 MG: 0.1 TABLET ORAL at 08:54

## 2019-10-24 RX ADMIN — CLONIDINE HYDROCHLORIDE 0.1 MG: 0.1 TABLET ORAL at 21:07

## 2019-10-24 RX ADMIN — HYDROXYZINE HYDROCHLORIDE 50 MG: 25 TABLET ORAL at 05:24

## 2019-10-24 RX ADMIN — MULTIPLE VITAMINS W/ MINERALS TAB 1 TABLET: TAB at 08:50

## 2019-10-24 ASSESSMENT — ACTIVITIES OF DAILY LIVING (ADL)
HYGIENE/GROOMING: INDEPENDENT
HYGIENE/GROOMING: INDEPENDENT
DRESS: SCRUBS (BEHAVIORAL HEALTH)
LAUNDRY: UNABLE TO COMPLETE
ORAL_HYGIENE: INDEPENDENT

## 2019-10-24 NOTE — PLAN OF CARE
BEHAVIORAL TEAM DISCUSSION     Participants:  Sania Pinto NP, Kathy Quevedo LSW,  Odalis Ray LSW, Gatito Noguera LSW, Mirlande Cormier OT, Sania Hand OT, Regine Jones OT  Progress: fair, still withdrawing, not puking today  Continued Stay Criteria/Rationale: still having anxiety, depression and SI  - still scoring on CIWA   Medical/Physical: withdrawal symptoms - CIWA- scheduled Zofran   Precautions:   Falls precaution?: No          Behavioral Orders   Procedures    Code 1 - Restrict to Unit    Routine Programming       As clinically indicated    Status 15       Every 15 minutes.      Plan: discharge home with family and resources when stabilized. Labs ordered  Rationale for change in precautions or plan: None     Active Problems:    Suicidal ideation    Current Facility-Administered Medications:     acetaminophen (TYLENOL) tablet 650 mg, 650 mg, Oral, Q4H PRN, Sania Pinto APRN CNP, 650 mg at 10/17/19 2209    cloNIDine (CATAPRES) tablet 0.1 mg, 0.1 mg, Oral, BID, Sania Pinto APRN CNP, 0.1 mg at 10/24/19 0854    diazepam (VALIUM) tablet 10 mg, 10 mg, Oral, Q30 Min PRN, Sania Pinto APRN CNP, 10 mg at 10/24/19 0155    FLUoxetine (PROzac) capsule 40 mg, 40 mg, Oral, Daily, Sania Pinto APRN CNP, 40 mg at 10/24/19 0850    folic acid (FOLVITE) tablet 1 mg, 1 mg, Oral, Daily, Sania Pinto APRN CNP, 1 mg at 10/24/19 0850    hydrOXYzine (ATARAX) tablet 25-50 mg, 25-50 mg, Oral, Q4H PRN, Sania Pinto APRN CNP, 50 mg at 10/24/19 0524    lamoTRIgine (LaMICtal) tablet 25 mg, 25 mg, Oral, Daily, Bernie Hernandez L, NP, 25 mg at 10/24/19 0850    magnesium hydroxide (MILK OF MAGNESIA) suspension 30 mL, 30 mL, Oral, At Bedtime PRN, Sania Pinto APRN CNP    melatonin 3 mg (with vit B6 10 mg) extended release tablet 1 tablet, 1 tablet, Oral, At Bedtime PRN, Sania Pinto, OBIE PA    multivitamin w/minerals (THERA-VIT-M) tablet 1 tablet, 1 tablet,  Oral, Daily, Sania Pinto APRN CNP, 1 tablet at 10/24/19 0850    nicotine (NICORETTE) gum 2-4 mg, 2-4 mg, Buccal, Q1H PRN, Sania Pinto APRN CNP    OLANZapine (zyPREXA) tablet 10 mg, 10 mg, Oral, Q8H PRN **OR** OLANZapine (zyPREXA) injection 10 mg, 10 mg, Intramuscular, Q8H PRN, Sania Pinto APRN CNP    ondansetron (ZOFRAN) tablet 4 mg, 4 mg, Oral, Q6H PRN, Sania Pinto APRN CNP, 4 mg at 10/23/19 1153    ondansetron (ZOFRAN-ODT) ODT tab 4 mg, 4 mg, Oral, Daily, Bernie Hernandez, NP, 4 mg at 10/24/19 0506    traZODone (DESYREL) tablet 50 mg, 50 mg, Oral, At Bedtime PRN, Sania Pinto APRN CNP, 50 mg at 10/17/19 8352

## 2019-10-24 NOTE — PLAN OF CARE
Problem: Adult Behavioral Health Plan of Care  Goal: Patient-Specific Goal (Individualization)  Description  Pt will follow recommendations of treatment team.  Pt will be compliant with medications.  Pt will sleep 6-8 hours each nights.  Pt will attend 50 % of groups.     10/24/2019 0314 by Anisa Birmingham, RN  Outcome: No Change    Pt has been in bed with eyes closed and regular respirations x 5 hours total. 15 minute and PRN checks all night. Will continue to monitor.    0150 Pt. Scoring 8 on CIWA administered 10 mg Valium per scale.     0350 Pt. scoring 1 on CIWA     0500 Pt. Score 6 on CIWA    Pt. Administered PRN Hydroxyzine 100 mg po @ 0524 Per Pt. Request for increasing anxiety with somewhat effective results.        Face to face end of shift report to be communicated to oncoming RN.     Anisa Birmingham, RN  10/24/2019

## 2019-10-24 NOTE — PLAN OF CARE
"Spoke with pt this morning- She states she is \"not great but getting better.\" Pt denies SI or intentions on hurting herself but states she when she gets into a low depression she wishes that she never existed. Pt talks about being \"stubborn and a closet drinker for 5 years.\" Says she is worried about when she gets home and home she will handle things if she sinks into a depression again. Pt says she is feeling \"very low today.\" Pt says she is coming to terms with her diagnosis but it \"sucks.\" She says that her family is \"too much.\" Informs staff that she has been avoiding calls from her mom because her mom said that when she stays with her brother she can only have one of her dogs so now she just wants to take all of her camping stuff, dogs, and go live in the woods. She says she knows that she shouldn't make any decisions while in the hospital but she thinks she wants to get a divorce- pt says he is not there for her emotionally and this hurts her. Pt was encouraged to spend time out of her room and in group programming.    "

## 2019-10-24 NOTE — PROGRESS NOTES
"[facility]  Psychiatric Progress Note      Impression:     Patient reports \"low day\", is lying in bed and continues to score CIWA - although lightening up and she has had no emesis for 2 days now.  She denies outright suicidal thoughts, although continues with depressed mood and passive ideation, and reports not sleeping well last night.  We discuss while she may still be going through withdrawal, her activity level has been low, she has not gone to groups now for past couple days and laying in bed most of the day, will have significant impact on her sleep at night.  We discuss increasing fluoxetine, which she agrees to, and that it appears she is now more in the state of \"escaping\" things she will need to deal with - she agrees.  Encourage patient to get up more, attend some group programming and increase activity level.  Also encourage reaching out to her family which she has been avoiding as well.             DIagnoses:   Major Depressive Disorder, recurrent, severe without psychosis  Alcohol Abuse Disorder             Plan:   Schedule Zofran in the morning  Continue CIWA  Increase Prozac to 60mg daily    Encourage increased activity level and group programming    ELOS:  >5 days for stabilization and diminish withdrawals      Attestation:  Patient has been seen and evaluated by me,  OBIE Marquez CNP          Interim History:   The patient's care was discussed with the treatment team and chart notes were reviewed.          Medications:     Current Facility-Administered Medications Ordered in Epic   Medication Dose Route Frequency Last Rate Last Dose     acetaminophen (TYLENOL) tablet 650 mg  650 mg Oral Q4H PRN   650 mg at 10/17/19 2209     cloNIDine (CATAPRES) tablet 0.1 mg  0.1 mg Oral BID   0.1 mg at 10/24/19 0854     diazepam (VALIUM) tablet 10 mg  10 mg Oral Q30 Min PRN   10 mg at 10/24/19 0155     [START ON 10/25/2019] FLUoxetine (PROzac) capsule 60 mg  60 mg Oral Daily         " "folic acid (FOLVITE) tablet 1 mg  1 mg Oral Daily   1 mg at 10/24/19 0850     hydrOXYzine (ATARAX) tablet 25-50 mg  25-50 mg Oral Q4H PRN   50 mg at 10/24/19 0524     lamoTRIgine (LaMICtal) tablet 25 mg  25 mg Oral Daily   25 mg at 10/24/19 0850     magnesium hydroxide (MILK OF MAGNESIA) suspension 30 mL  30 mL Oral At Bedtime PRN         melatonin 3 mg (with vit B6 10 mg) extended release tablet 1 tablet  1 tablet Oral At Bedtime PRN         multivitamin w/minerals (THERA-VIT-M) tablet 1 tablet  1 tablet Oral Daily   1 tablet at 10/24/19 0850     nicotine (NICORETTE) gum 2-4 mg  2-4 mg Buccal Q1H PRN         OLANZapine (zyPREXA) tablet 10 mg  10 mg Oral Q8H PRN        Or     OLANZapine (zyPREXA) injection 10 mg  10 mg Intramuscular Q8H PRN         ondansetron (ZOFRAN) tablet 4 mg  4 mg Oral Q6H PRN   4 mg at 10/23/19 1153     ondansetron (ZOFRAN-ODT) ODT tab 4 mg  4 mg Oral Daily   4 mg at 10/24/19 0506     traZODone (DESYREL) tablet 50 mg  50 mg Oral At Bedtime PRN   50 mg at 10/17/19 2215     No current Epic-ordered outpatient medications on file.          10 point ROS negative        Allergies:     Allergies   Allergen Reactions     Codeine             Psychiatric Examination:   /65   Pulse 85   Temp 97.4  F (36.3  C) (Tympanic)   Resp 16   Ht 1.753 m (5' 9\")   Wt 86.6 kg (191 lb)   SpO2 99%   BMI 28.21 kg/m    Weight is 191 lbs 0 oz  Body mass index is 28.21 kg/m .    Appearance:  awake, alert  Attitude:  cooperative  Eye Contact:  good  Mood:  depressed  Affect:  mood congruent  Speech:  clear, coherent  Psychomotor Behavior:  no evidence of tardive dyskinesia, dystonia, or tics  Thought Process:  logical and linear  Associations:  no loose associations  Thought Content:  no evidence of suicidal ideation or homicidal ideation and no evidence of psychotic thought  Insight:  good  Judgment:  good  Oriented to:  time, person, and place  Attention Span and Concentration:  intact  Recent and Remote " Memory:  intact  Fund of Knowledge: appropriate  Muscle Strength and Tone: normal  Gait and Station: Normal           Labs:     Results for orders placed or performed during the hospital encounter of 10/14/19   UA reflex to Microscopic and Culture   Result Value Ref Range    Color Urine Yellow     Appearance Urine Clear     Glucose Urine Negative NEG^Negative mg/dL    Bilirubin Urine Negative NEG^Negative    Ketones Urine Negative NEG^Negative mg/dL    Specific Gravity Urine 1.015 1.003 - 1.035    Blood Urine Trace (A) NEG^Negative    pH Urine 6.0 4.7 - 8.0 pH    Protein Albumin Urine Negative NEG^Negative mg/dL    Urobilinogen mg/dL Normal 0.0 - 2.0 mg/dL    Nitrite Urine Negative NEG^Negative    Leukocyte Esterase Urine Negative NEG^Negative    Source Midstream Urine     RBC Urine <1 0 - 2 /HPF    WBC Urine 2 0 - 5 /HPF    Bacteria Urine Few (A) NEG^Negative /HPF    Mucous Urine Present (A) NEG^Negative /LPF   Ammonia   Result Value Ref Range    Ammonia 26 10 - 50 umol/L   Hepatic panel   Result Value Ref Range    Bilirubin Direct 0.2 0.0 - 0.2 mg/dL    Bilirubin Total 0.5 0.2 - 1.3 mg/dL    Albumin 3.8 3.4 - 5.0 g/dL    Protein Total 7.3 6.8 - 8.8 g/dL    Alkaline Phosphatase 72 40 - 150 U/L    ALT 40 0 - 50 U/L    AST 18 0 - 45 U/L   CBC with platelets differential   Result Value Ref Range    WBC 6.3 4.0 - 11.0 10e9/L    RBC Count 4.33 3.8 - 5.2 10e12/L    Hemoglobin 13.5 11.7 - 15.7 g/dL    Hematocrit 41.1 35.0 - 47.0 %    MCV 95 78 - 100 fl    MCH 31.2 26.5 - 33.0 pg    MCHC 32.8 31.5 - 36.5 g/dL    RDW 12.6 10.0 - 15.0 %    Platelet Count 250 150 - 450 10e9/L    Diff Method Automated Method     % Neutrophils 53.0 %    % Lymphocytes 35.9 %    % Monocytes 7.7 %    % Eosinophils 2.6 %    % Basophils 0.6 %    % Immature Granulocytes 0.2 %    Nucleated RBCs 0 0 /100    Absolute Neutrophil 3.3 1.6 - 8.3 10e9/L    Absolute Lymphocytes 2.3 0.8 - 5.3 10e9/L    Absolute Monocytes 0.5 0.0 - 1.3 10e9/L    Absolute  Eosinophils 0.2 0.0 - 0.7 10e9/L    Absolute Basophils 0.0 0.0 - 0.2 10e9/L    Abs Immature Granulocytes 0.0 0 - 0.4 10e9/L    Absolute Nucleated RBC 0.0

## 2019-10-24 NOTE — PLAN OF CARE
"Problem: Adult Behavioral Health Plan of Care  Goal: Patient-Specific Goal (Individualization)  Description  Pt will follow recommendations of treatment team.  Pt will be compliant with medications.  Pt will sleep 6-8 hours each nights.  Pt will attend 50 % of groups.    Pt in bed at start of shift talking with roommate. According to charting, pt slept approx. 5 hours last night. Pt endorses anxiety and states that the prn atarax received on nightshift did not help much. Pt does have a slight hand tremor and HA. Pt declined prn for HA. Pt denies nausea at this time stating that the zofran has been helping. Pt ate 25% of breakfast; pt ate a muffin and stated that she had no nausea from that. Pt hopeful the lamictal will be effective as pt feels she may be bipolar because none of her antidepressants have worked in the past.    0846 Pt scored a 6 on the CIWA for anxiety, slight hand tremor and HA. After med administration, pt rested in bed. Pt stated she doesn't feel up for groups this am. Will rescore CIWA according to protocol.   1030 Pt continues to rest in bed. Pt states that this usually happens at home where she becomes \"bedbound\" and that is when she would use her \"coping skill\" of alcohol. Asked about other coping skills pt reported yoga, exercise, her two dogs as effective. Offered a yoga mat, pt stated would think about it. Encouraged pt to attend some groups. Pt stated will probably come out for the therapy dogs and the CD group at 1500.   1200 Pt ate 100% of lunch.  1229 CIWA 4. Pt c/o anxiety, HA and hand tremor has decreased since this am. Pt states does feel slightly sweaty. Not perceptible. Prn atarax administered per pt request at 1237 for anxiety.  1245 Pt came out to visit with therapy dog. Mood brighter while with dog.     Outcome: Improving    Face to face end of shift report communicated to oncoming RN.     Latha Vance RN  10/24/2019  1:08 PM               "

## 2019-10-25 PROCEDURE — 25000128 H RX IP 250 OP 636: Performed by: NURSE PRACTITIONER

## 2019-10-25 PROCEDURE — 25000132 ZZH RX MED GY IP 250 OP 250 PS 637: Performed by: NURSE PRACTITIONER

## 2019-10-25 PROCEDURE — 12400000 ZZH R&B MH

## 2019-10-25 PROCEDURE — 99233 SBSQ HOSP IP/OBS HIGH 50: CPT | Performed by: NURSE PRACTITIONER

## 2019-10-25 RX ORDER — DIAZEPAM 5 MG
10 TABLET ORAL EVERY 6 HOURS PRN
Status: DISCONTINUED | OUTPATIENT
Start: 2019-10-25 | End: 2019-10-30 | Stop reason: HOSPADM

## 2019-10-25 RX ORDER — CLONIDINE HYDROCHLORIDE 0.1 MG/1
0.1 TABLET ORAL 2 TIMES DAILY PRN
Status: DISCONTINUED | OUTPATIENT
Start: 2019-10-25 | End: 2019-10-30 | Stop reason: HOSPADM

## 2019-10-25 RX ADMIN — FLUOXETINE HYDROCHLORIDE 60 MG: 40 CAPSULE ORAL at 08:30

## 2019-10-25 RX ADMIN — TRAZODONE HYDROCHLORIDE 50 MG: 50 TABLET ORAL at 21:41

## 2019-10-25 RX ADMIN — DIAZEPAM 10 MG: 5 TABLET ORAL at 10:37

## 2019-10-25 RX ADMIN — Medication 1 TABLET: at 21:41

## 2019-10-25 RX ADMIN — MULTIPLE VITAMINS W/ MINERALS TAB 1 TABLET: TAB at 08:30

## 2019-10-25 RX ADMIN — ONDANSETRON 4 MG: 4 TABLET, ORALLY DISINTEGRATING ORAL at 06:17

## 2019-10-25 RX ADMIN — FOLIC ACID 1 MG: 1 TABLET ORAL at 08:30

## 2019-10-25 RX ADMIN — DIAZEPAM 10 MG: 5 TABLET ORAL at 16:41

## 2019-10-25 RX ADMIN — LAMOTRIGINE 25 MG: 25 TABLET ORAL at 08:30

## 2019-10-25 RX ADMIN — ACETAMINOPHEN 650 MG: 325 TABLET, FILM COATED ORAL at 16:41

## 2019-10-25 RX ADMIN — CLONIDINE HYDROCHLORIDE 0.1 MG: 0.1 TABLET ORAL at 15:03

## 2019-10-25 RX ADMIN — DIAZEPAM 10 MG: 5 TABLET ORAL at 02:48

## 2019-10-25 ASSESSMENT — ACTIVITIES OF DAILY LIVING (ADL)
HYGIENE/GROOMING: INDEPENDENT
ORAL_HYGIENE: INDEPENDENT
DRESS: SCRUBS (BEHAVIORAL HEALTH);INDEPENDENT
HYGIENE/GROOMING: INDEPENDENT

## 2019-10-25 NOTE — PLAN OF CARE
"  Problem: Adult Behavioral Health Plan of Care  Goal: Patient-Specific Goal (Individualization)  Description  Pt will follow recommendations of treatment team.  Pt will be compliant with medications.  Pt will sleep 6-8 hours each nights.  Pt will attend 50 % of groups.     10/25/2019 1724 by Meera Brewer, RN  Outcome: No change  Note:   Pt c/o mild nausea (no vomiting), sweating, anxiety, and a headache rated \"4/10\". Pt continues to have moderate tremors with arms extended. She was given 10 mg of PRN Valium at 1641 along with 650 mg of PRN Tylenol. Pt reported decrease in pain and withdrawal symptoms upon reassessment.    Pt endorsed \"passive suicidal ideation\" - she stated \"you know how tragic things happen to good people? I'd volunteer.\" Pt discussed how her \"best friend killed herself when she was [age].\" Pt stated \"she hung herself in a closet. I've felt the other side of that pain and would never put my family through that.\" Pt contracts for safety on the unit. Verbalized that her CD issues are r/t \"trauma Hx.\" Pt did not want to discuss Hx at this time. Pt discussed how she plans on staying with her brother and his family upon discharge. She stated \"out of my seven brothers, six are chemically dependent. The brother who I will be staying with has been able to maintain sobriety. He was addicted to narcotics.\" Pt expressed concern about \"loss of independence\" with discharge plan, but accepts that it is a safe plan. Pt did participate in group programming tonight.     2141 - Pt was given PRN Melatonin and 50 mg of PRN Trazodone for sleep.     Face to face end of shift report communicated to oncoming RN.        Problem: Substance Withdrawal  Goal: Substance Withdrawal  Description  Pt will safely withdraw from alcohol during hospitalization.    10/25/2019 1724 by Meera Brewer, RN  Outcome: No Change  Note:   Pt continues to have withdrawal symptoms. She was given 10 mg of PRN Valium, which decreased symptoms.    "   Problem: Depression  Goal: Improved Mood  Description  Patient will report that her depression is manageable by discharge.   Patient will report 2 new coping skills learned while inpatient to deal with depression and SI.      10/25/2019 1724 by Meera Brewer RN  Outcome: No Change  Note:   Pt continues to have a depressed mood. Pt verbalized that groups have been beneficial to her and that she would like to be set-up with therapy upon discharge.

## 2019-10-25 NOTE — PLAN OF CARE
"Met with pt to check in. Pt gave writer an update of the last few days. States trying to be more open with everyone about SI and symptoms. Pt states that symptoms are still present and compared it to the flu. \" It is like when you have the real flu and you get to that peak of it being the worse and than it is over and you do not have it anymore but you still feel like crap.That is how I feel right now\". Pt talked about groups and trying to get out of bed more. States having difficulties with this still but today got up and made bed and went out for breakfast, but still laid in bed a lot. Pt mentioned med changes and new diagnosis. Pt states having difficulties processing it and has concerns about cost of new med. Pt also had concerns about insurance running out on this stay here. Writer explained that there would be a notification from insurance before this happened. Pt also mentioned being concerned about being discharged and what life will be like.   "

## 2019-10-25 NOTE — PLAN OF CARE
"Problem: Adult Behavioral Health Plan of Care  Goal: Patient-Specific Goal (Individualization)  Description  Pt will follow recommendations of treatment team.  Pt will be compliant with medications.  Pt will sleep 6-8 hours each nights.  Pt will attend 50 % of groups.    Pt slept approx. 3 hours last night. Pt reported poor sleep, mostly laying in bed all night. Will continue to monitor.    0820 Pt up in lounge for breakfast this am. Pt ate 25% of breakfast. Pt did report nausea, unable to administer zofran at this time as pt had just received a dose. No vomiting. Pt endorses high anxiety, moderate hand tremor, appears sweaty, c/o mild HA. Pt declined prns for HA. Pt scored 10 on CIWA at 0825. BP this am after breakfast 96/69. Updated provider. Scheduled catapres held and no prn valium administered per provider due to low BP. Will recheck BP in about an hour. Pt reports that she is still feeling down, depressed and some suicidal thoughts. Pt able to contract for safety. Pt states that she feels she rotates between feeling like she can do anything to feeling like she can't do anything at all. Pt did take increased dose of prozac this am. Pt worried it may cause increase in suicidal thoughts/anxiety. Pt will notify staff if this is the case.  1037 Scored 8 on CIWA. Prn valium 10mg administered at this time. Pt continues to have tremors, high anxiety, HA. Talked with pt about family support and how they are setting everything up for her. Pt appreciative but wishes they weren't quite as invasive/involved. Pt states she hasn't called them the past couple days because she \"spirals\" on the phone. Pt hoping to get over withdrawal here and then be able to focus more on decreasing her suicidal thoughts which are still present. Pt talking about how she has 6 brothers who went through treatment for chemical dependency and feels guilty that she then also became dependent. Pt states she is going to have to completely change her " CHIEF COMPLAINT:     Patient presents with:  Physical: Annual physical       HPI:   Meenakshi Hernandez is a 50year old male who presents for a general physical. He recently had a work physical- 2 weeks ago.   During PFT testing he \"blackout\" LOC for co "daily routine as it was focused on alcohol. Pt also with questions on where she would be in life now if she hadn't been a \"closet drinker\" the past 5 years. Mom called for update; attempted to call back with no answer. Pt's goal for the day is to stay out of room and in lounge.  1200 Pt ate 25% of lunch.  1503 Pt c/o anxiety, nausea, HA. /64 Pulse 90. Prn clonidine administered at this time. Pt declined prn tylenol and zofran. Pt stating will take zofran closer to dinner time. Pt aware of being cautious standing if feeling dizzy etc.     Outcome: No Change    Problem: Substance Withdrawal  Goal: Substance Withdrawal  Description  Pt will safely withdraw from alcohol during hospitalization.      See above.    Outcome: No Change     Problem: Depression  Goal: Improved Mood  Description  Patient will report that her depression is manageable by discharge.   Patient will report 2 new coping skills learned while inpatient to deal with depression and SI.     See above.     Outcome: No Change    Face to face end of shift report communicated to oncoming RN.     Latha Vance RN  10/25/2019  1:32 PM           " abdominal pain, N/V/C/D.   MUSCULOSKELETAL: no arthralgia or swollen joints  LYMPH:  Denies lymphadenopathy  NEURO: Denies headaches or lightheadedness      EXAM:   /82   Pulse 90   Temp 97.8 °F (36.6 °C) (Oral)   Resp 16   Ht 71\"   Wt 259 lb   SpO2 - Followed by pain management         - Do not take oxycodone while working/driving

## 2019-10-25 NOTE — PLAN OF CARE
Face to face end of shift report received from Kashif HERBERT RN. Rounding completed and patient observed lying in bed with eyes closed, breathing regular and unlabored.     02:40 Update: Patient observed sitting in the lounge. This writer checked on her and she reported she was unable to sleep and that her anxiety was very high. Patient scored a 13 on the CIWA scale and was given 10mg Valium. She reported she would rather not take it. She was offered atarax and she stated this had not worked for her so she agreed to take the Valium. Patient sat in the lounge for about another 30 minutes and then went to lay down.     3:40 Update: Patient observed lying in bed with eyes closed. CIWA score is 1.     06:30am Update: Patient appeared to sleep off and on throughout the night with position changes noted. Patient slept approximately 3 hours. Patient had no complaints of pain.    07:30 Update: Face to face end of shift report communicated to oncoming RN.      Problem: Adult Behavioral Health Plan of Care  Goal: Patient-Specific Goal (Individualization)  Description  Pt will follow recommendations of treatment team.  Pt will be compliant with medications.  Pt will sleep 6-8 hours each nights.  Pt will attend 50 % of groups.     10/25/2019 0431 by Eda Bales, RN  Outcome: No Change     Problem: Depression  Goal: Improved Mood  Description  Patient will report that her depression is manageable by discharge.   Patient will report 2 new coping skills learned while inpatient to deal with depression and SI.      Outcome: No Change     Problem: Substance Withdrawal  Goal: Substance Withdrawal  Description  Pt will safely withdraw from alcohol during hospitalization.    10/25/2019 0431 by Eda Bales, RN  Outcome: Improving

## 2019-10-25 NOTE — PROGRESS NOTES
[facility]  Psychiatric Progress Note      Impression:     Patient just getting out of the shower, reports still not feeling well - clammy with slight tremors.  We discuss her blood pressure being low past several days and agree to change clonidine to prn.  We also discuss Valium as she only took one tablet yesterday.  We agree to try putting prn and utilize other prns for some of her symptoms such as Atarax and Tylenol for headache.  Patient continues to voice depressed mood, adding feeling shame and guilt for being in this position, endorses passive suicidal thoughts, and did not sleep well.  Encourage patient again to continue to go to groups and socialize in [facility] area as much as possible, she does say she got out of bed this morning and made her bed, showered.  Encourage patient to utilize Melatonin tonight if needed.           DIagnoses:   Major Depressive Disorder, recurrent, severe without psychosis  Alcohol Abuse Disorder             Plan:   Schedule Zofran in the morning  Valium 10mg prn - will consider decrease to 5mg  Prozac to 60mg daily  Lamictal 25mg      Encourage increased activity level and group programming    ELOS:  >5 days for stabilization and diminish withdrawals      Attestation:  Patient has been seen and evaluated by me,  Sania Pinto, OBIE CNP          Interim History:   The patient's care was discussed with the treatment team and chart notes were reviewed.          Medications:     Current Facility-Administered Medications Ordered in Epic   Medication Dose Route Frequency Last Rate Last Dose     acetaminophen (TYLENOL) tablet 650 mg  650 mg Oral Q4H PRN   650 mg at 10/17/19 2209     cloNIDine (CATAPRES) tablet 0.1 mg  0.1 mg Oral BID PRN         diazepam (VALIUM) tablet 10 mg  10 mg Oral Q30 Min PRN   10 mg at 10/25/19 1037     FLUoxetine (PROzac) capsule 60 mg  60 mg Oral Daily   60 mg at 10/25/19 0830     folic acid (FOLVITE) tablet 1 mg  1 mg Oral Daily   1 mg at  "10/25/19 0830     hydrOXYzine (ATARAX) tablet 25-50 mg  25-50 mg Oral Q4H PRN   50 mg at 10/24/19 1237     lamoTRIgine (LaMICtal) tablet 25 mg  25 mg Oral Daily   25 mg at 10/25/19 0830     magnesium hydroxide (MILK OF MAGNESIA) suspension 30 mL  30 mL Oral At Bedtime PRN         melatonin 3 mg (with vit B6 10 mg) extended release tablet 1 tablet  1 tablet Oral At Bedtime PRN         multivitamin w/minerals (THERA-VIT-M) tablet 1 tablet  1 tablet Oral Daily   1 tablet at 10/25/19 0830     nicotine (NICORETTE) gum 2-4 mg  2-4 mg Buccal Q1H PRN         OLANZapine (zyPREXA) tablet 10 mg  10 mg Oral Q8H PRN        Or     OLANZapine (zyPREXA) injection 10 mg  10 mg Intramuscular Q8H PRN         ondansetron (ZOFRAN) tablet 4 mg  4 mg Oral Q6H PRN   4 mg at 10/23/19 1153     ondansetron (ZOFRAN-ODT) ODT tab 4 mg  4 mg Oral Daily   4 mg at 10/25/19 0617     traZODone (DESYREL) tablet 50 mg  50 mg Oral At Bedtime PRN   50 mg at 10/17/19 2215     No current Epic-ordered outpatient medications on file.          10 point ROS negative        Allergies:     Allergies   Allergen Reactions     Codeine             Psychiatric Examination:   /63   Pulse 81   Temp 96.9  F (36.1  C) (Tympanic)   Resp 14   Ht 1.753 m (5' 9\")   Wt 86.6 kg (191 lb)   SpO2 97%   BMI 28.21 kg/m    Weight is 191 lbs 0 oz  Body mass index is 28.21 kg/m .    Appearance:  awake, alert  Attitude:  cooperative  Eye Contact:  good  Mood:  depressed  Affect:  mood congruent  Speech:  clear, coherent  Psychomotor Behavior:  no evidence of tardive dyskinesia, dystonia, or tics  Thought Process:  logical and linear  Associations:  no loose associations  Thought Content:  no evidence of suicidal ideation or homicidal ideation and no evidence of psychotic thought  Insight:  good  Judgment:  good  Oriented to:  time, person, and place  Attention Span and Concentration:  intact  Recent and Remote Memory:  intact  Fund of Knowledge: appropriate  Muscle " Strength and Tone: normal  Gait and Station: Normal           Labs:     Results for orders placed or performed during the hospital encounter of 10/14/19   UA reflex to Microscopic and Culture   Result Value Ref Range    Color Urine Yellow     Appearance Urine Clear     Glucose Urine Negative NEG^Negative mg/dL    Bilirubin Urine Negative NEG^Negative    Ketones Urine Negative NEG^Negative mg/dL    Specific Gravity Urine 1.015 1.003 - 1.035    Blood Urine Trace (A) NEG^Negative    pH Urine 6.0 4.7 - 8.0 pH    Protein Albumin Urine Negative NEG^Negative mg/dL    Urobilinogen mg/dL Normal 0.0 - 2.0 mg/dL    Nitrite Urine Negative NEG^Negative    Leukocyte Esterase Urine Negative NEG^Negative    Source Midstream Urine     RBC Urine <1 0 - 2 /HPF    WBC Urine 2 0 - 5 /HPF    Bacteria Urine Few (A) NEG^Negative /HPF    Mucous Urine Present (A) NEG^Negative /LPF   Ammonia   Result Value Ref Range    Ammonia 26 10 - 50 umol/L   Hepatic panel   Result Value Ref Range    Bilirubin Direct 0.2 0.0 - 0.2 mg/dL    Bilirubin Total 0.5 0.2 - 1.3 mg/dL    Albumin 3.8 3.4 - 5.0 g/dL    Protein Total 7.3 6.8 - 8.8 g/dL    Alkaline Phosphatase 72 40 - 150 U/L    ALT 40 0 - 50 U/L    AST 18 0 - 45 U/L   CBC with platelets differential   Result Value Ref Range    WBC 6.3 4.0 - 11.0 10e9/L    RBC Count 4.33 3.8 - 5.2 10e12/L    Hemoglobin 13.5 11.7 - 15.7 g/dL    Hematocrit 41.1 35.0 - 47.0 %    MCV 95 78 - 100 fl    MCH 31.2 26.5 - 33.0 pg    MCHC 32.8 31.5 - 36.5 g/dL    RDW 12.6 10.0 - 15.0 %    Platelet Count 250 150 - 450 10e9/L    Diff Method Automated Method     % Neutrophils 53.0 %    % Lymphocytes 35.9 %    % Monocytes 7.7 %    % Eosinophils 2.6 %    % Basophils 0.6 %    % Immature Granulocytes 0.2 %    Nucleated RBCs 0 0 /100    Absolute Neutrophil 3.3 1.6 - 8.3 10e9/L    Absolute Lymphocytes 2.3 0.8 - 5.3 10e9/L    Absolute Monocytes 0.5 0.0 - 1.3 10e9/L    Absolute Eosinophils 0.2 0.0 - 0.7 10e9/L    Absolute Basophils 0.0 0.0  - 0.2 10e9/L    Abs Immature Granulocytes 0.0 0 - 0.4 10e9/L    Absolute Nucleated RBC 0.0

## 2019-10-25 NOTE — PLAN OF CARE
"Pt isolative  Encouraged to attend group Pt stated will attend last group of evening   Pt ate well for supper without nausea or vomiting  Pt has very mild tremors  at 1700  Pt states hasn't been taking phone calls from family stating \"they are getting my brothers place ready for me and they bought me a recliner and a weighted blanket \"  and are making my brothers like a little ap't for me' \" I would much rather go to my home but I know I can't \"  "

## 2019-10-25 NOTE — PLAN OF CARE
BEHAVIORAL TEAM DISCUSSION     Participants:  Sania Pinto NP, Kathy Quevedo LSW,  Odalis Ray LSW, Gatito Noguera LSW, Mirlande Cormier OT, Sania Hand OT, Regine Jones OT, Alex Tijerina [facility]  Progress: fair, still withdrawing, not puking today  Continued Stay Criteria/Rationale: still having anxiety, depression and SI  - still scoring on CIWA   Medical/Physical: withdrawal symptoms - CIWA- scheduled Zofran   Precautions:   Falls precaution?: No          Behavioral Orders   Procedures    Code 1 - Restrict to Unit    Routine Programming       As clinically indicated    Status 15       Every 15 minutes.      Plan: discharge home with family and resources when stabilized. Labs ordered  Rationale for change in precautions or plan: None     Active Problems:    Suicidal ideation      Current Facility-Administered Medications:     acetaminophen (TYLENOL) tablet 650 mg, 650 mg, Oral, Q4H PRN, Sania Pinto APRN CNP, 650 mg at 10/17/19 2209    cloNIDine (CATAPRES) tablet 0.1 mg, 0.1 mg, Oral, BID PRN, Sania Pinto APRTOMI CNP    diazepam (VALIUM) tablet 10 mg, 10 mg, Oral, Q30 Min PRN, Sania Pinto APRN CNP, 10 mg at 10/25/19 0248    FLUoxetine (PROzac) capsule 60 mg, 60 mg, Oral, Daily, Sania Pinto APRN CNP, 60 mg at 10/25/19 0830    folic acid (FOLVITE) tablet 1 mg, 1 mg, Oral, Daily, Sania Pinto APRN CNP, 1 mg at 10/25/19 0830    hydrOXYzine (ATARAX) tablet 25-50 mg, 25-50 mg, Oral, Q4H PRN, Sania Pinto APRN CNP, 50 mg at 10/24/19 1237    lamoTRIgine (LaMICtal) tablet 25 mg, 25 mg, Oral, Daily, Bernie Hernandez L, NP, 25 mg at 10/25/19 0830    magnesium hydroxide (MILK OF MAGNESIA) suspension 30 mL, 30 mL, Oral, At Bedtime PRN, Saina Pinto APRN CNP    melatonin 3 mg (with vit B6 10 mg) extended release tablet 1 tablet, 1 tablet, Oral, At Bedtime PRN, Sania Pinto, OBIE PA    multivitamin w/minerals (THERA-VIT-M) tablet 1 tablet, 1 tablet,  Oral, Daily, Sania Pinto APRN CNP, 1 tablet at 10/25/19 0830    nicotine (NICORETTE) gum 2-4 mg, 2-4 mg, Buccal, Q1H PRN, Sania Pinto APRN CNP    OLANZapine (zyPREXA) tablet 10 mg, 10 mg, Oral, Q8H PRN **OR** OLANZapine (zyPREXA) injection 10 mg, 10 mg, Intramuscular, Q8H PRN, Sania Pinto APRN CNP    ondansetron (ZOFRAN) tablet 4 mg, 4 mg, Oral, Q6H PRN, Sania Pinto APRN CNP, 4 mg at 10/23/19 1153    ondansetron (ZOFRAN-ODT) ODT tab 4 mg, 4 mg, Oral, Daily, Bernie Hernandez, NP, 4 mg at 10/25/19 0617    traZODone (DESYREL) tablet 50 mg, 50 mg, Oral, At Bedtime PRN, Sania Pinto APRN CNP, 50 mg at 10/17/19 4276

## 2019-10-26 PROCEDURE — 25000128 H RX IP 250 OP 636: Performed by: NURSE PRACTITIONER

## 2019-10-26 PROCEDURE — 25000132 ZZH RX MED GY IP 250 OP 250 PS 637: Performed by: NURSE PRACTITIONER

## 2019-10-26 PROCEDURE — 12400000 ZZH R&B MH

## 2019-10-26 RX ADMIN — DIAZEPAM 10 MG: 5 TABLET ORAL at 16:22

## 2019-10-26 RX ADMIN — HYDROXYZINE HYDROCHLORIDE 50 MG: 25 TABLET ORAL at 08:46

## 2019-10-26 RX ADMIN — HYDROXYZINE HYDROCHLORIDE 50 MG: 25 TABLET ORAL at 19:28

## 2019-10-26 RX ADMIN — LAMOTRIGINE 25 MG: 25 TABLET ORAL at 08:37

## 2019-10-26 RX ADMIN — ONDANSETRON 4 MG: 4 TABLET, ORALLY DISINTEGRATING ORAL at 06:41

## 2019-10-26 RX ADMIN — Medication 1 TABLET: at 21:35

## 2019-10-26 RX ADMIN — MULTIPLE VITAMINS W/ MINERALS TAB 1 TABLET: TAB at 08:37

## 2019-10-26 RX ADMIN — FOLIC ACID 1 MG: 1 TABLET ORAL at 08:38

## 2019-10-26 RX ADMIN — FLUOXETINE HYDROCHLORIDE 60 MG: 40 CAPSULE ORAL at 08:37

## 2019-10-26 RX ADMIN — TRAZODONE HYDROCHLORIDE 50 MG: 50 TABLET ORAL at 21:35

## 2019-10-26 ASSESSMENT — ACTIVITIES OF DAILY LIVING (ADL)
DRESS: SCRUBS (BEHAVIORAL HEALTH);INDEPENDENT
DRESS: SCRUBS (BEHAVIORAL HEALTH);INDEPENDENT
HYGIENE/GROOMING: INDEPENDENT
HYGIENE/GROOMING: INDEPENDENT
ORAL_HYGIENE: INDEPENDENT
ORAL_HYGIENE: INDEPENDENT
LAUNDRY: UNABLE TO COMPLETE

## 2019-10-26 NOTE — PLAN OF CARE
Problem: Depression  Goal: Improved Mood  Description  Patient will report that her depression is manageable by discharge.   Patient will report 2 new coping skills learned while inpatient to deal with depression and SI.      Outcome: No Change     Problem: Adult Behavioral Health Plan of Care  Goal: Patient-Specific Goal (Individualization)  Description  Pt will follow recommendations of treatment team.  Pt will be compliant with medications.  Pt will sleep 6-8 hours each nights.  Pt will attend 50 % of groups.     Outcome: Improving  Note:          Problem: Adult Behavioral Health Plan of Care  Goal: Adheres to Safety Considerations for Self and Others  Outcome: Improving     Problem: Adult Behavioral Health Plan of Care  Goal: Optimized Coping Skills in Response to Life Stressors  Outcome: Improving    Patient is seen in the lounge at the start of the shift, she is interacting with other patients and pleasant and cooperative with nurse. Patient does report some anxiety at start of the shift, she states it is not bad but wants to take a prn to keep anxiety under control. Atarax is given, with good results. Patient eats her meals this shift, she states that the zofran she takes has done wonders for her, in controlling her nausea. Patient denies SI, HI, hallucinations, pain, reports some anxiety with depression. Patient did attend group this AM, but does go to lie down this afternoon. Patient does not report or display any withdrawal s/s.     End of shift hand off report to be given to the oncoming RN.
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
## 2019-10-26 NOTE — PLAN OF CARE
Observed pt lying on left side - eyes closed - non-labored breathing noted.  Slept all noc without issue - Face to face end of shift report communicated to oncoming RN.     Zara Tai RN  10/26/2019  7:09 AM

## 2019-10-26 NOTE — PROGRESS NOTES
Pt. particpated actively in group, she shared more stories from her childhood and her marriage.  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  She reports that she is considering divorce.  This writer cautioned her about making big decisions at this point in her recovery as this is a big decision and might be better considered when she is feeling healthier.

## 2019-10-27 PROCEDURE — 25000132 ZZH RX MED GY IP 250 OP 250 PS 637: Performed by: NURSE PRACTITIONER

## 2019-10-27 PROCEDURE — 99232 SBSQ HOSP IP/OBS MODERATE 35: CPT | Performed by: NURSE PRACTITIONER

## 2019-10-27 PROCEDURE — 25000128 H RX IP 250 OP 636: Performed by: PSYCHIATRY & NEUROLOGY

## 2019-10-27 PROCEDURE — 12400000 ZZH R&B MH

## 2019-10-27 RX ORDER — ONDANSETRON 4 MG/1
4 TABLET, ORALLY DISINTEGRATING ORAL DAILY
Status: DISCONTINUED | OUTPATIENT
Start: 2019-10-27 | End: 2019-10-28

## 2019-10-27 RX ADMIN — LAMOTRIGINE 25 MG: 25 TABLET ORAL at 08:15

## 2019-10-27 RX ADMIN — ONDANSETRON 4 MG: 4 TABLET, ORALLY DISINTEGRATING ORAL at 06:46

## 2019-10-27 RX ADMIN — CLONIDINE HYDROCHLORIDE 0.1 MG: 0.1 TABLET ORAL at 08:15

## 2019-10-27 RX ADMIN — FLUOXETINE HYDROCHLORIDE 60 MG: 40 CAPSULE ORAL at 08:15

## 2019-10-27 RX ADMIN — FOLIC ACID 1 MG: 1 TABLET ORAL at 08:16

## 2019-10-27 RX ADMIN — MULTIPLE VITAMINS W/ MINERALS TAB 1 TABLET: TAB at 08:15

## 2019-10-27 RX ADMIN — Medication 1 TABLET: at 22:27

## 2019-10-27 RX ADMIN — HYDROXYZINE HYDROCHLORIDE 50 MG: 25 TABLET ORAL at 06:45

## 2019-10-27 ASSESSMENT — ACTIVITIES OF DAILY LIVING (ADL)
DRESS: SCRUBS (BEHAVIORAL HEALTH);INDEPENDENT
HYGIENE/GROOMING: INDEPENDENT
HYGIENE/GROOMING: INDEPENDENT
DRESS: SCRUBS (BEHAVIORAL HEALTH);INDEPENDENT
ORAL_HYGIENE: INDEPENDENT
ORAL_HYGIENE: INDEPENDENT

## 2019-10-27 ASSESSMENT — MIFFLIN-ST. JEOR: SCORE: 1638.97

## 2019-10-27 NOTE — PLAN OF CARE
"Observed pt lying on her left side - eyes closed - non-labored breathing noted. Slept all noc without issue.Face to face end of shift report communicated to oncoming RN. Administered atarax 50 mg po per pt request for c/o ongoing anxiety - relays \"I don't need the valium anymore\"  Is in good spirits - in the [facility] area visiting with peers.    Zara Tai RN  10/27/2019  5:59 AM        "

## 2019-10-27 NOTE — PLAN OF CARE
Problem: Adult Behavioral Health Plan of Care  Goal: Patient-Specific Goal (Individualization)  Description  Pt will follow recommendations of treatment team.  Pt will be compliant with medications.  Pt will sleep 6-8 hours each nights.  Pt will attend 50 % of groups.       Outcome: No Change  Pt up on the unit for breakfast this morning. Presented as extremely anxious, shaking. PRN Clonidine 0.1 mg PO given at approximately 0815 with fair effect. Pt ate 75% of breakfast meal. Pt rested in bed after breakfast and did attend group later in the morning. Pt reported that she slept well--approximately 7 hours last night. Compliant with medications as prescribed.     Problem: Substance Withdrawal  Goal: Substance Withdrawal  Description  Pt will safely withdraw from alcohol during hospitalization.    Outcome: No Change    Problem: Depression  Goal: Improved Mood  Description  Patient will report that her depression is manageable by discharge.   Patient will report 2 new coping skills learned while inpatient to deal with depression and SI.        Outcome: No Change  Pt reports continued depression and passive SI. Denied active plan and/or intent.

## 2019-10-27 NOTE — PROGRESS NOTES
"[facility]  Psychiatric Progress Note      Impression:     Abimbola is in her room journaling when I go to see her today. She reports that she is feeling better today. She does report some passive thoughts of not wanting to continue living like this, though states that she is focusing on being more positive today. She does talk about her plan for staying sober. She states that she was a \"closet drinker\" and would drink everyday. \"During the week that I work I would set an alarm to know when to stop drinking, so that I would be sober enough to go to work. When I wasn't working I would drink whenever\". She states that she has needed minimal PRNs today and that her plan for tomorrow is to not require any extra medication besides what is scheduled. She states that she will probably be ready for discharge on Tuesday, at least that is her goal. She does tell me that she plans to go live with her brother, who is sober though had struggled with addiction issues in the past, \"so he kind of knows what I am going through\". She does state that she has not returned her mother's phone calls, calling her \"suffocating\". Does feel that the medications are helping.           DIagnoses:   Major Depressive Disorder, recurrent, severe without psychosis  Alcohol Abuse Disorder, severe             Plan:   Continue current medications  Monitor for alcohol withdrawal    ELOS:  2-3 days for stabilization and diminish withdrawals, safe discharge plan in place      Attestation:  Patient has been seen and evaluated by me,  Danielle Burks NP          Interim History:   The patient's care was discussed with the treatment team and chart notes were reviewed.          Medications:     Current Facility-Administered Medications Ordered in Epic   Medication Dose Route Frequency Last Rate Last Dose     acetaminophen (TYLENOL) tablet 650 mg  650 mg Oral Q4H PRN   650 mg at 10/25/19 1641     cloNIDine (CATAPRES) tablet 0.1 mg  0.1 mg Oral BID " "PRN   0.1 mg at 10/27/19 0815     diazepam (VALIUM) tablet 10 mg  10 mg Oral Q6H PRN   10 mg at 10/26/19 1622     FLUoxetine (PROzac) capsule 60 mg  60 mg Oral Daily   60 mg at 10/27/19 0815     folic acid (FOLVITE) tablet 1 mg  1 mg Oral Daily   1 mg at 10/27/19 0816     hydrOXYzine (ATARAX) tablet 25-50 mg  25-50 mg Oral Q4H PRN   50 mg at 10/27/19 0645     [START ON 10/28/2019] influenza quadrivalent (PF) vacc (FLUZONE) injection 0.5 mL  0.5 mL Intramuscular Prior to discharge         lamoTRIgine (LaMICtal) tablet 25 mg  25 mg Oral Daily   25 mg at 10/27/19 0815     magnesium hydroxide (MILK OF MAGNESIA) suspension 30 mL  30 mL Oral At Bedtime PRN         melatonin 3 mg (with vit B6 10 mg) extended release tablet 1 tablet  1 tablet Oral At Bedtime PRN   1 tablet at 10/26/19 2135     multivitamin w/minerals (THERA-VIT-M) tablet 1 tablet  1 tablet Oral Daily   1 tablet at 10/27/19 0815     nicotine (NICORETTE) gum 2-4 mg  2-4 mg Buccal Q1H PRN         OLANZapine (zyPREXA) tablet 10 mg  10 mg Oral Q8H PRN        Or     OLANZapine (zyPREXA) injection 10 mg  10 mg Intramuscular Q8H PRN         ondansetron (ZOFRAN) tablet 4 mg  4 mg Oral Q6H PRN   4 mg at 10/23/19 1153     ondansetron (ZOFRAN-ODT) ODT tab 4 mg  4 mg Oral Daily   4 mg at 10/27/19 0646     traZODone (DESYREL) tablet 50 mg  50 mg Oral At Bedtime PRN   50 mg at 10/26/19 2135     No current Epic-ordered outpatient medications on file.          10 point ROS- nausea improving        Allergies:     Allergies   Allergen Reactions     Codeine             Psychiatric Examination:   /65   Pulse 70   Temp 96.9  F (36.1  C) (Tympanic)   Resp 16   Ht 1.753 m (5' 9\")   Wt 85 kg (187 lb 4.8 oz)   SpO2 98%   BMI 27.66 kg/m    Weight is 187 lbs 4.8 oz  Body mass index is 27.66 kg/m .    Appearance: awake, alert, dressed in hospital scrubs, casually groomed  Attitude: pleasant and cooperative  Eye Contact:  good  Mood: less depressed and anxious " today  Affect: full range  Speech:  clear, coherent  Psychomotor Behavior:  no evidence of tardive dyskinesia, dystonia, or tics  Thought Process:  logical and linear, more goal oriented  Associations:  no loose associations  Thought Content: denies any suicidal or homicidal ideation, denies hallucinations or paranoia  Insight:  good  Judgment:  good  Oriented to:  time, person, and place  Attention Span and Concentration:  intact  Recent and Remote Memory:  intact  Fund of Knowledge: appropriate for education  Muscle Strength and Tone: normal  Gait and Station: Normal           Labs:     No results found for this or any previous visit (from the past 24 hour(s)).

## 2019-10-28 PROCEDURE — 12400000 ZZH R&B MH

## 2019-10-28 PROCEDURE — 25000132 ZZH RX MED GY IP 250 OP 250 PS 637: Performed by: NURSE PRACTITIONER

## 2019-10-28 PROCEDURE — 99232 SBSQ HOSP IP/OBS MODERATE 35: CPT | Performed by: NURSE PRACTITIONER

## 2019-10-28 RX ADMIN — MULTIPLE VITAMINS W/ MINERALS TAB 1 TABLET: TAB at 08:38

## 2019-10-28 RX ADMIN — FLUOXETINE HYDROCHLORIDE 60 MG: 40 CAPSULE ORAL at 08:39

## 2019-10-28 RX ADMIN — FOLIC ACID 1 MG: 1 TABLET ORAL at 08:38

## 2019-10-28 RX ADMIN — Medication 1 TABLET: at 22:36

## 2019-10-28 RX ADMIN — LAMOTRIGINE 25 MG: 25 TABLET ORAL at 08:39

## 2019-10-28 ASSESSMENT — ACTIVITIES OF DAILY LIVING (ADL)
ORAL_HYGIENE: INDEPENDENT
HYGIENE/GROOMING: INDEPENDENT
DRESS: SCRUBS (BEHAVIORAL HEALTH);INDEPENDENT

## 2019-10-28 NOTE — PLAN OF CARE
"Problem: Adult Behavioral Health Plan of Care  Goal: Patient-Specific Goal (Individualization)  Description  Pt will follow recommendations of treatment team.  Pt will be compliant with medications.  Pt will sleep 6-8 hours each nights.  Pt will attend 50 % of groups.      10/28/2019 1345 by Chelo Herrera RN  Outcome: No Change  Pt has been up on the unit all shift. Social with peers. Attending groups. Pleasant and cooperative on assessment. Denied pain. Reported improved anxiety and depression, but continued passive SI--\"like seeing something that is available on a menu and knowing it's an option, but having no desire for it.\" Pt slept approximately 7 hours last night. Compliant with medications as prescribed. Ate 50% of breakfast meal.    Problem: Substance Withdrawal  Goal: Substance Withdrawal  Description  Pt will safely withdraw from alcohol during hospitalization.    Outcome: No Change  Pt denied signs/symptoms of withdrawal.    Problem: Depression  Goal: Improved Mood  Description  Patient will report that her depression is manageable by discharge.   Patient will report 2 new coping skills learned while inpatient to deal with depression and SI.        Outcome: No Change        "

## 2019-10-28 NOTE — PLAN OF CARE
Problem: Adult Behavioral Health Plan of Care  Goal: Patient-Specific Goal (Individualization)  Description  Pt will follow recommendations of treatment team.  Pt will be compliant with medications.  Pt will sleep 6-8 hours each nights.  Pt will attend 50 % of groups.     10/28/2019 0044 by Anisa Birmingham, RN  Outcome: No Change  Note:        Pt has been in bed with eyes closed and regular respirations x 7 hours this noc shift. 15 minute and PRN checks all night. No complaints offered. Will continue to monitor.    Pt. requesting to not be woke for scheduled Zofran 4 mg ODT medication. Refused med once awake.Requesting Zofran be PRN. Provider sticky noted.      Face to face end of shift report to be communicated to oncoming RN.     Anisa Birmingham, KIM  10/28/2019

## 2019-10-28 NOTE — PLAN OF CARE
BEHAVIORAL TEAM DISCUSSION    Participants: Kathy Quevedo LSW,  Odalis Ray LSW, Gatito Noguera LSW, Ros[facility], Mirlande Cormier OT, Sania Hand OT, Regine Jones OT, Alex Tijerina [facility], Edna Trujillo RN, Gladys Nuñez RN   Progress: fair  Continued Stay Criteria/Rationale: depression   Medical/Physical: none known at this time   Precautions:   Falls precaution?: No          Behavioral Orders   Procedures    Code 1 - Restrict to Unit    Routine Programming       As clinically indicated    Status 15       Every 15 minutes.      Plan: discharge home with family and resources when stabilized.   Rationale for change in precautions or plan: None     Active Problems:    Suicidal ideation        Current Facility-Administered Medications:     acetaminophen (TYLENOL) tablet 650 mg, 650 mg, Oral, Q4H PRN, Sania Pinto APRN CNP, 650 mg at 10/17/19 2209    cloNIDine (CATAPRES) tablet 0.1 mg, 0.1 mg, Oral, BID PRN, Sania Pinto APRN CNP    diazepam (VALIUM) tablet 10 mg, 10 mg, Oral, Q30 Min PRN, Sania Pinto APRN CNP, 10 mg at 10/25/19 0248    FLUoxetine (PROzac) capsule 60 mg, 60 mg, Oral, Daily, WoJose L roblesa Yumi, APRN CNP, 60 mg at 10/25/19 0830    folic acid (FOLVITE) tablet 1 mg, 1 mg, Oral, Daily, WoJose L roblesa Yumi, APRN CNP, 1 mg at 10/25/19 0830    hydrOXYzine (ATARAX) tablet 25-50 mg, 25-50 mg, Oral, Q4H PRN, Sania Pinto APRN CNP, 50 mg at 10/24/19 1237    lamoTRIgine (LaMICtal) tablet 25 mg, 25 mg, Oral, Daily, Bernie Hernandez L, NP, 25 mg at 10/25/19 0830    magnesium hydroxide (MILK OF MAGNESIA) suspension 30 mL, 30 mL, Oral, At Bedtime PRN, Sania Pinto APRN CNP    melatonin 3 mg (with vit B6 10 mg) extended release tablet 1 tablet, 1 tablet, Oral, At Bedtime PRN, Sania Pinto APRN CNP    multivitamin w/minerals (THERA-VIT-M) tablet 1 tablet, 1 tablet, Oral, Daily, Sania Pinto APRN CNP, 1 tablet at 10/25/19  0830    nicotine (NICORETTE) gum 2-4 mg, 2-4 mg, Buccal, Q1H PRN, Sania Pinto APRN CNP    OLANZapine (zyPREXA) tablet 10 mg, 10 mg, Oral, Q8H PRN **OR** OLANZapine (zyPREXA) injection 10 mg, 10 mg, Intramuscular, Q8H PRN, Woelperchio, Sania Yumi, APRN CNP    ondansetron (ZOFRAN) tablet 4 mg, 4 mg, Oral, Q6H PRN, Jose L Pintoa Yumi APRN CNP, 4 mg at 10/23/19 1153    ondansetron (ZOFRAN-ODT) ODT tab 4 mg, 4 mg, Oral, Daily, Bernie Hernandez, NP, 4 mg at 10/25/19 0617    traZODone (DESYREL) tablet 50 mg, 50 mg, Oral, At Bedtime PRN, Jose L Pintoa Yumi, APRN CNP, 50 mg at 10/17/19 4405

## 2019-10-28 NOTE — PLAN OF CARE
"  Problem: Adult Behavioral Health Plan of Care  Goal: Patient-Specific Goal (Individualization)  Description  Pt will follow recommendations of treatment team.  Pt will be compliant with medications.  Pt will sleep 6-8 hours each nights.  Pt will attend 50 % of groups.     10/27/2019 2244 by Meera Brewer, RN  Outcome: Improving  Note:   Patient has been up on the unit and social with peers. She participated in group programming. Pt stated \"today is the best I've felt since I've been here. The withdrawals are gone.\" Pt stated \"I've been writing to-do lists and rules and expectations for living with my brother to have appropriate boundaries.\" She stated \"I think I have enough distractions, plans, and goals to help me maintain sobriety.\" Pt discussed how cooking is a relaxation outlet for her. She stated \"I plan on pairing food with tea instead of wine.\" She discussed how she wants to get back into yoga, learn the keyboard, and spend time with her brother's farm animals as they are therapeutic. Pt informed this writer that she talked to her mom today and that conversation went well aside from a few tears. She is feeling more optimistic about her  wanting to do counseling. Pt stated that her goal for tomorrow is to get through the day without a PRN medication aside from melatonin for sleep. She is hopeful that she will be able to discharge on Tuesday or Wednesday.     2227 - Pt was given PRN Melatonin for sleep.     Face to face end of shift report communicated to oncoming RN.        Problem: Substance Withdrawal  Goal: Substance Withdrawal  Description  Pt will safely withdraw from alcohol during hospitalization.    10/27/2019 2244 by Meera Brewer, RN  Outcome: Improving  Note:   Pt stated \"the withdrawals are gone.\"      Problem: Depression  Goal: Improved Mood  Description  Patient will report that her depression is manageable by discharge.   Patient will report 2 new coping skills learned while inpatient to " deal with depression and SI.      10/27/2019 2244 by Meera Brewer, RN  Outcome: Improving  Note:   Pt endorsed mild depression. She was more hopeful about her future today.

## 2019-10-28 NOTE — PROGRESS NOTES
"[facility]  Psychiatric Progress Note      Impression:     Abimbola is in group when I go to see her today. She states that today is the best that she has felt, both physically and mentally. Reports no nausea today, feels she is \"through the worst of it\". Does state that she would like to discharge this week, likely Wednesday. She has family that will come to get her and take her to her brother's house. Denies any concerns with medications, no reported side effects. Does feel that her mood has improved, denies any active suicidal thoughts. Has been sleeping well at night. Has been attending groups, is social and supportive of others.            DIagnoses:   Major Depressive Disorder, recurrent, severe without psychosis  Alcohol Abuse Disorder, severe             Plan:   Continue current medications  Monitor for alcohol withdrawal  Likely discharge home with family Wednesday    ELOS:  2-3 days for stabilization and diminish withdrawals, safe discharge plan in place      Attestation:  Patient has been seen and evaluated by me,  Danielle Burks NP          Interim History:   The patient's care was discussed with the treatment team and chart notes were reviewed.          Medications:     Current Facility-Administered Medications Ordered in Epic   Medication Dose Route Frequency Last Rate Last Dose     acetaminophen (TYLENOL) tablet 650 mg  650 mg Oral Q4H PRN   650 mg at 10/25/19 1641     cloNIDine (CATAPRES) tablet 0.1 mg  0.1 mg Oral BID PRN   0.1 mg at 10/27/19 0815     diazepam (VALIUM) tablet 10 mg  10 mg Oral Q6H PRN   10 mg at 10/26/19 1622     FLUoxetine (PROzac) capsule 60 mg  60 mg Oral Daily   60 mg at 10/28/19 0839     folic acid (FOLVITE) tablet 1 mg  1 mg Oral Daily   1 mg at 10/28/19 0838     hydrOXYzine (ATARAX) tablet 25-50 mg  25-50 mg Oral Q4H PRN   50 mg at 10/27/19 0645     influenza quadrivalent (PF) vacc (FLUZONE) injection 0.5 mL  0.5 mL Intramuscular Prior to discharge         " "lamoTRIgine (LaMICtal) tablet 25 mg  25 mg Oral Daily   25 mg at 10/28/19 0839     magnesium hydroxide (MILK OF MAGNESIA) suspension 30 mL  30 mL Oral At Bedtime PRN         melatonin 3 mg (with vit B6 10 mg) extended release tablet 1 tablet  1 tablet Oral At Bedtime PRN   1 tablet at 10/27/19 2227     multivitamin w/minerals (THERA-VIT-M) tablet 1 tablet  1 tablet Oral Daily   1 tablet at 10/28/19 0838     nicotine (NICORETTE) gum 2-4 mg  2-4 mg Buccal Q1H PRN         OLANZapine (zyPREXA) tablet 10 mg  10 mg Oral Q8H PRN        Or     OLANZapine (zyPREXA) injection 10 mg  10 mg Intramuscular Q8H PRN         ondansetron (ZOFRAN) tablet 4 mg  4 mg Oral Q6H PRN   4 mg at 10/23/19 1153     traZODone (DESYREL) tablet 50 mg  50 mg Oral At Bedtime PRN   50 mg at 10/26/19 5467     No current [facility]-ordered outpatient medications on file.          10 point ROS- denies any current concerns       Allergies:     Allergies   Allergen Reactions     Codeine             Psychiatric Examination:   /73   Pulse 69   Temp 97.6  F (36.4  C) (Tympanic)   Resp 16   Ht 1.753 m (5' 9\")   Wt 85 kg (187 lb 4.8 oz)   SpO2 95%   BMI 27.66 kg/m    Weight is 187 lbs 4.8 oz  Body mass index is 27.66 kg/m .    Appearance: awake, alert, dressed in hospital scrubs, casually groomed  Attitude: cooperative, pleasant  Eye Contact:  good  Mood: \"better\", less anxiety today  Affect: full range  Speech:  clear, coherent  Psychomotor Behavior:  no evidence of tardive dyskinesia, dystonia, or tics  Thought Process: logical, linear, goal oriented  Associations:  no loose associations  Thought Content: denies any suicidal or homicidal ideation, denies hallucinations or paranoia  Insight:  good  Judgment:  good  Oriented to:  time, person, and place  Attention Span and Concentration:  intact  Recent and Remote Memory:  intact  Fund of Knowledge: appropriate for education  Muscle Strength and Tone: normal  Gait and Station: Normal           Labs: "     No results found for this or any previous visit (from the past 24 hour(s)).

## 2019-10-29 PROCEDURE — 25000128 H RX IP 250 OP 636: Performed by: NURSE PRACTITIONER

## 2019-10-29 PROCEDURE — 90686 IIV4 VACC NO PRSV 0.5 ML IM: CPT | Performed by: NURSE PRACTITIONER

## 2019-10-29 PROCEDURE — 25000132 ZZH RX MED GY IP 250 OP 250 PS 637: Performed by: NURSE PRACTITIONER

## 2019-10-29 PROCEDURE — 12400000 ZZH R&B MH

## 2019-10-29 RX ORDER — LAMOTRIGINE 25 MG/1
25 TABLET ORAL DAILY
Qty: 60 TABLET | Refills: 0 | Status: SHIPPED | OUTPATIENT
Start: 2019-10-30 | End: 2021-08-10

## 2019-10-29 RX ADMIN — Medication 1 TABLET: at 20:56

## 2019-10-29 RX ADMIN — FOLIC ACID 1 MG: 1 TABLET ORAL at 08:25

## 2019-10-29 RX ADMIN — MULTIPLE VITAMINS W/ MINERALS TAB 1 TABLET: TAB at 08:26

## 2019-10-29 RX ADMIN — FLUOXETINE HYDROCHLORIDE 60 MG: 40 CAPSULE ORAL at 08:25

## 2019-10-29 RX ADMIN — INFLUENZA A VIRUS A/BRISBANE/02/2018 IVR-190 (H1N1) ANTIGEN (FORMALDEHYDE INACTIVATED), INFLUENZA A VIRUS A/KANSAS/14/2017 X-327 (H3N2) ANTIGEN (FORMALDEHYDE INACTIVATED), INFLUENZA B VIRUS B/PHUKET/3073/2013 ANTIGEN (FORMALDEHYDE INACTIVATED), AND INFLUENZA B VIRUS B/MARYLAND/15/2016 BX-69A ANTIGEN (FORMALDEHYDE INACTIVATED) 0.5 ML: 15; 15; 15; 15 INJECTION, SUSPENSION INTRAMUSCULAR at 10:07

## 2019-10-29 RX ADMIN — LAMOTRIGINE 25 MG: 25 TABLET ORAL at 08:26

## 2019-10-29 ASSESSMENT — ACTIVITIES OF DAILY LIVING (ADL)
DRESS: SCRUBS (BEHAVIORAL HEALTH);INDEPENDENT
ORAL_HYGIENE: INDEPENDENT
DRESS: SCRUBS (BEHAVIORAL HEALTH);INDEPENDENT
HYGIENE/GROOMING: INDEPENDENT
HYGIENE/GROOMING: INDEPENDENT
ORAL_HYGIENE: INDEPENDENT

## 2019-10-29 NOTE — PLAN OF CARE
Spoke with pt this afternoon- She states she is doing much better now that she is through the withdrawal. Says she has been up and is attending all of the group programming. Pt shares her discharge check list of things that she wants taken care of before she leaves- getting her medications filled here, a flu shot and a note for work that she got it, a note for work stating she was hospitalized here, her mom's FMLA paperwork filled out, and follow up apt with her PCP, along with resources for therapy- discussed all of this with pt. Pt denies SI today. Says she is feeling ready for discharge tomorrow and says her mom will either be driving up in the morning from the [employer] or taking a half day from work, but she is not sure yet but will keep staff updated. Informed NP of notes needed for pt's work.

## 2019-10-29 NOTE — PLAN OF CARE
Problem: Adult Behavioral Health Plan of Care  Goal: Patient-Specific Goal (Individualization)  Description  Pt will follow recommendations of treatment team.  Pt will be compliant with medications.  Pt will sleep 6-8 hours each nights.  Pt will attend 50 % of groups.  Outcome: No Change  Note:          Pt has been in bed with eyes closed and regular respirations x 7 hours this noc shift. 15 minute and PRN checks all night. No complaints offered. Will continue to monitor.        Face to face end of shift report to be communicated to oncoming RN.      Anisa Birmingham RN  10/29/2019

## 2019-10-29 NOTE — PLAN OF CARE
"Face to face end of shift report communicated to oncoming shift.     Nara Carmen, RN  10/28/2019  7:26 PM    Patient compliant with treatment team recommendations.  Patient compliant with all medication admin.  Patient reports no difficulties with sleep.  Patient attends all available groups.   Patient reports \"I feel pretty good today\"    Patient denies SI , HI , AH and VH    Will continue to monitor.     Problem: Adult Behavioral Health Plan of Care  Goal: Patient-Specific Goal (Individualization)  Description  Pt will follow recommendations of treatment team.  Pt will be compliant with medications.  Pt will sleep 6-8 hours each nights.  Pt will attend 50 % of groups.     10/28/2019 1921 by Nara Carmen, RN  Outcome: No Change  Note:   Problem: Substance Withdrawal  Goal: Substance Withdrawal  Description  Pt will safely withdraw from alcohol during hospitalization.    10/28/2019 1921 by Nara Carmen, RN  Outcome: Improving     "

## 2019-10-29 NOTE — PLAN OF CARE
"Problem: Adult Behavioral Health Plan of Care  Goal: Patient-Specific Goal (Individualization)  Description  Pt will follow recommendations of treatment team.  Pt will be compliant with medications.  Pt will sleep 6-8 hours each nights.  Pt will attend 50 % of groups.     10/29/2019 1231 by Chelo Herrera RN  Outcome: No Change  Pt has been up on the unit all shift. Social with peers and attending groups. Denied pain. Told this nurse \"I feel good.\" Ate 100% of breakfast meal. Pt slept approximately 7 hours last night. Compliant with medications as prescribed. Remains independent with ADLs and presents as neat and well-groomed.     Problem: Substance Withdrawal  Goal: Substance Withdrawal  Description  Pt will safely withdraw from alcohol during hospitalization.    Outcome: No Change  Pt denied any signs/symptoms of alcohol withdrawal. Reports continued poor appetite, but tells this nurse that \"I force myself to eat so that I don't take pills on an empty stomach.\"    Problem: Depression  Goal: Improved Mood  Description  Patient will report that her depression is manageable by discharge.   Patient will report 2 new coping skills learned while inpatient to deal with depression and SI.        Outcome: No Change  Pt reports depression as manageable.         "

## 2019-10-30 ENCOUNTER — RECORDS - HEALTHEAST (OUTPATIENT)
Dept: ADMINISTRATIVE | Facility: OTHER | Age: 29
End: 2019-10-30

## 2019-10-30 VITALS
BODY MASS INDEX: 27.74 KG/M2 | RESPIRATION RATE: 14 BRPM | HEART RATE: 74 BPM | SYSTOLIC BLOOD PRESSURE: 115 MMHG | WEIGHT: 187.3 LBS | DIASTOLIC BLOOD PRESSURE: 78 MMHG | HEIGHT: 69 IN | TEMPERATURE: 98 F | OXYGEN SATURATION: 98 %

## 2019-10-30 PROCEDURE — 25000132 ZZH RX MED GY IP 250 OP 250 PS 637: Performed by: NURSE PRACTITIONER

## 2019-10-30 PROCEDURE — 99239 HOSP IP/OBS DSCHRG MGMT >30: CPT | Performed by: NURSE PRACTITIONER

## 2019-10-30 RX ADMIN — MULTIPLE VITAMINS W/ MINERALS TAB 1 TABLET: TAB at 08:19

## 2019-10-30 RX ADMIN — FLUOXETINE HYDROCHLORIDE 60 MG: 40 CAPSULE ORAL at 08:19

## 2019-10-30 RX ADMIN — FOLIC ACID 1 MG: 1 TABLET ORAL at 08:19

## 2019-10-30 RX ADMIN — LAMOTRIGINE 25 MG: 25 TABLET ORAL at 08:19

## 2019-10-30 ASSESSMENT — ACTIVITIES OF DAILY LIVING (ADL)
DRESS: SCRUBS (BEHAVIORAL HEALTH)
HYGIENE/GROOMING: INDEPENDENT
ORAL_HYGIENE: INDEPENDENT

## 2019-10-30 NOTE — PLAN OF CARE
"  Problem: Adult Behavioral Health Plan of Care  Goal: Patient-Specific Goal (Individualization)  Description  Pt will follow recommendations of treatment team.  Pt will be compliant with medications.  Pt will sleep 6-8 hours each nights.  Pt will attend 50 % of groups.     10/29/2019 1958 by Meera Brewer, RN  Outcome: Improving  Note:   Pt had a full range affect and calm mood. Pt hopeful about the future. She verbalized that she is feeling \"very confident\" about discharging tomorrow and that her mom will pick her up between 1 and 2 tomorrow. She stated \"I'm overwhelmed with love and feeling grateful. My family has everything set-up for me in my brother's basement.\" Thought process was logical, linear and goal oriented.     Face to face end of shift report communicated to oncoming RN.      Problem: Substance Withdrawal  Goal: Substance Withdrawal  Description  Pt will safely withdraw from alcohol during hospitalization.    10/29/2019 1958 by Meera Brewer, RN  Outcome: Improving  Note:   Pt did not exhibit any signs or symptoms of withdrawal.      Problem: Depression  Goal: Improved Mood  Description  Patient will report that her depression is manageable by discharge.   Patient will report 2 new coping skills learned while inpatient to deal with depression and SI.      10/29/2019 1958 by Meera Brewer, RN  Outcome: Improving  Note:   Pt denied depression and suicidal ideation.      "

## 2019-10-30 NOTE — PLAN OF CARE
Problem: Adult Behavioral Health Plan of Care  Goal: Patient-Specific Goal (Individualization)  Description  Pt will follow recommendations of treatment team.  Pt will be compliant with medications.  Pt will sleep 6-8 hours each nights.  Pt will attend 50 % of groups.     10/30/2019 0306 by Anisa Birmingham, RN  Outcome: No Change  Note:     Pt has been in bed with eyes closed and regular respirations x 7 hours this noc shift. 15 minute and PRN checks all night. No complaints offered. Will continue to monitor.    Face to face end of shift report to be communicated to oncoming RN.     Anisa Birmingham, RN  10/30/2019

## 2019-10-30 NOTE — PLAN OF CARE
"  Problem: Adult Behavioral Health Plan of Care  Goal: Patient-Specific Goal (Individualization)  Description  Pt will follow recommendations of treatment team.  Pt will be compliant with medications.  Pt will sleep 6-8 hours each nights.  Pt will attend 50 % of groups.     10/30/2019 1011 by Meera Laguerre RN  Outcome: Improving  Note:   Shift Summery:  Patient has been up on the unit and behavior is appropriate. Patient states she is not depressed or having thoughts of self harm. Patient is preparing for discharge and planning for going home. Patient states that her mother will pick her up this afternoon between 1 and 2 pm. Patient sleep and appetite is good, denies physical problems.     Patient's Stated Goal for Shift:  \"discharge\"    Goal Status:  In Process       Problem: Substance Withdrawal  Goal: Substance Withdrawal  Description  Pt will safely withdraw from alcohol during hospitalization.    Outcome: Improving     Problem: Depression  Goal: Improved Mood  Description  Patient will report that her depression is manageable by discharge.   Patient will report 2 new coping skills learned while inpatient to deal with depression and SI.      Outcome: Improving     "

## 2019-10-30 NOTE — PLAN OF CARE
Discharge Note    Patient Discharged to home on 10/30/2019 at 5:15 PM via private car accompanied by her mom.     Patient and her mom informed of discharge instructions in AVS. Patient and her mom verbalizes understanding and denies having any questions pertaining to AVS. Patient stable at time of discharge. Patient denies SI, HI, and thoughts of self harm at time of discharge. All personal belongings returned to patient. Discharge prescriptions sent to [facility] and picked up by pt prior to leaving hospital. Psych evaluation, history and physical, AVS, and discharge summary faxed to next level of care by  Kathy Brewer RN  10/30/2019  5:33 PM

## 2019-10-30 NOTE — DISCHARGE INSTRUCTIONS
Behavioral Discharge Planning and Instructions    Summary: Patient was admitted with SI and a plan to overdose     Main Diagnosis: Major Depressive Disorder, recurrent, severe without psychosis, Alcohol Abuse Disorder    Major Treatments, Procedures and Findings: Stabilize with medications, connect with community programs.    Symptoms to Report: feeling more aggressive, increased confusion, losing more sleep, mood getting worse or thoughts of suicide    Lifestyle Adjustment: Take all medications as prescribed, meet with doctor/ medication provider, out patient therapist, , and [facility] worker as scheduled. Abstain from alcohol or any unprescribed drugs.    Psychiatry Follow-up:     [facility]   PCP- Mary Dias- November 7th @ 2pm - check in 1:45pm   [address] [place] [address]   Phone: [phone] Fax: [phone]    Therapy and CD Resources:     [facility]  [facility] [place]- also works with couples  [address] [address]  Phone: [phone]    [facility]  [facility] [place]  [address] [place] [address]  Phone: [phone]    [facility]  Nathaly Garcia, [facility], [facility]  [address], [place] [address]  Phone: [phone]    [facility], [facility]  [address], [address]  [place], [place] [address]  Call Odalis Blank [phone]    [facility]  [address] [place] [address]  [address]  [place], [place] [address]  Phone: [phone]    Resources:   Crisis Intervention: 904.229.1474 or 690-432-3276 (TTY: 367.363.9932).  Call anytime for help.  National [place] on Mental Illness (www.[place].william.org): 356.187.5999 or 163-475-4350.  Alcoholics Anonymous (www.alcoholics-anonymous.org): Check your phone book for your local chapter.  Suicide Awareness Voices of Education (SAVE) (www.save.org): 813-688-TEOU (2253)  National  "Suicide Prevention Line (www.mentalhealthmn.org): 028-873-TCBV (8488)  Mental Health Consumer/Survivor Network of [place] (www.mhcsn.net): 933.803.8053 or 318-999-3318  Mental Health Association of [place] (www.mentalhealth.org): 369.834.3570 or 521-479-4251    General Medication Instructions:   See your medication sheet(s) for instructions.   Take all medicines as directed.  Make no changes unless your doctor suggests them.   Go to all your doctor visits.  Be sure to have all your required lab tests. This way, your medicines can be refilled on time.  Do not use any drugs not prescribed by your doctor.  Avoid alcohol.    [place] Area:  [facility], Crisis stabilization Lists of hospitals in the [place]- 999.704.2524  [facility] Crisis Line: 1-360.453.8658  Advocates For Family Peace: 062-5063  Sexual Assault Program of [facility]: 937.735.6483 or 1-831.214.9222  [facility]: 3-677-472-4393 Ext: 279       Calls answered Mon-Fri-8:00 am--4:30 pm    [place]:  Advocates for Family Peace: 8-691-092-8942  [facility] - 4-618-923-4973  [facility] first call for help: 5-426-558-2849  [facility]:  (607) 620-7616      [place] Area:  Warm Line: 1-752.910.5487       Calls answered Tuesday--Saturday 4:00 pm--10:00 pm  [facility] Crisis Line - 516.631.3976  [facility] 232-309-8280    [place] Statewide:  [place] Crisis and Referral Services: 1-465-162-8794  National Suicide Prevention Lifeline: 6-722-813-TALK (7886)   - mrs7hnur- Text \"Life\" to 26836  First Call for Help: 2-1-1  [facility] Helpline- 0-680-OHRE-HELP      "

## 2019-10-30 NOTE — PLAN OF CARE
Spoke with pt this morning- Went over follow up apt and informed her that it would be rescheduled prior to her discharge. Pt goes over her discharge check list with staff and says she is waiting for the fax for her mom, her medications from our pharmacy, and she doesn't want to forget art projects from a peer. Pt says her  has now admitted to having depression and he has given his guns to a friend to lock up and says her brother has children so his hunting rifles are locked up in a safe. Pt says she would like her mom present when the nurse goes over the AVS with her- informed pt's nurse of this. She says her and her mom plan on spending some time in [place]. 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 Talks about her house renovations and how they will be demoing the home and building new. Says she is still not sure what will happen with her  but says they will work on things. Pt denies any other questions or concerns at this time.       Pt is discharging at the recommendation of the treatment team. Pt is discharging to home transported by mother. Pt denies having any thoughts of hurting themself or anyone else. Pt denies anxiety or depression. Pt has follow up with [facility] clinic. Discharge instructions, including; demographic sheet, psychiatric evaluation, discharge summary, and AVS were faxed to these next level of care providers.

## 2019-11-07 ENCOUNTER — COMMUNICATION - HEALTHEAST (OUTPATIENT)
Dept: FAMILY MEDICINE | Facility: CLINIC | Age: 29
End: 2019-11-07

## 2019-11-07 ENCOUNTER — OFFICE VISIT - HEALTHEAST (OUTPATIENT)
Dept: FAMILY MEDICINE | Facility: CLINIC | Age: 29
End: 2019-11-07

## 2019-11-07 DIAGNOSIS — Z09 HOSPITAL DISCHARGE FOLLOW-UP: ICD-10-CM

## 2019-11-07 DIAGNOSIS — F32.1 MAJOR DEPRESSIVE DISORDER, SINGLE EPISODE, MODERATE (H): ICD-10-CM

## 2019-11-07 DIAGNOSIS — F41.9 ANXIETY: ICD-10-CM

## 2019-11-07 DIAGNOSIS — F10.10 ALCOHOL ABUSE: ICD-10-CM

## 2019-11-07 ASSESSMENT — MIFFLIN-ST. JEOR: SCORE: 1617.39

## 2019-11-21 ENCOUNTER — OFFICE VISIT - HEALTHEAST (OUTPATIENT)
Dept: FAMILY MEDICINE | Facility: CLINIC | Age: 29
End: 2019-11-21

## 2019-11-21 DIAGNOSIS — F10.21 ALCOHOL DEPENDENCE IN REMISSION (H): ICD-10-CM

## 2019-11-21 DIAGNOSIS — F32.1 MAJOR DEPRESSIVE DISORDER, SINGLE EPISODE, MODERATE (H): ICD-10-CM

## 2019-11-21 DIAGNOSIS — F41.9 ANXIETY: ICD-10-CM

## 2019-11-21 ASSESSMENT — PATIENT HEALTH QUESTIONNAIRE - PHQ9: SUM OF ALL RESPONSES TO PHQ QUESTIONS 1-9: 9

## 2019-11-21 ASSESSMENT — MIFFLIN-ST. JEOR: SCORE: 1640.07

## 2019-12-12 ENCOUNTER — COMMUNICATION - HEALTHEAST (OUTPATIENT)
Dept: FAMILY MEDICINE | Facility: CLINIC | Age: 29
End: 2019-12-12

## 2019-12-12 DIAGNOSIS — Z30.09 GENERAL COUNSELING AND ADVICE FOR CONTRACEPTIVE MANAGEMENT: ICD-10-CM

## 2020-01-28 ENCOUNTER — COMMUNICATION - HEALTHEAST (OUTPATIENT)
Dept: FAMILY MEDICINE | Facility: CLINIC | Age: 30
End: 2020-01-28

## 2020-01-28 DIAGNOSIS — F41.9 ANXIETY: ICD-10-CM

## 2020-02-10 ENCOUNTER — HEALTH MAINTENANCE LETTER (OUTPATIENT)
Age: 30
End: 2020-02-10

## 2020-02-27 ENCOUNTER — COMMUNICATION - HEALTHEAST (OUTPATIENT)
Dept: FAMILY MEDICINE | Facility: CLINIC | Age: 30
End: 2020-02-27

## 2020-02-27 DIAGNOSIS — F32.1 MAJOR DEPRESSIVE DISORDER, SINGLE EPISODE, MODERATE (H): ICD-10-CM

## 2020-03-17 ENCOUNTER — OFFICE VISIT - HEALTHEAST (OUTPATIENT)
Dept: FAMILY MEDICINE | Facility: CLINIC | Age: 30
End: 2020-03-17

## 2020-03-17 ENCOUNTER — COMMUNICATION - HEALTHEAST (OUTPATIENT)
Dept: FAMILY MEDICINE | Facility: CLINIC | Age: 30
End: 2020-03-17

## 2020-03-17 ASSESSMENT — ANXIETY QUESTIONNAIRES
3. WORRYING TOO MUCH ABOUT DIFFERENT THINGS: MORE THAN HALF THE DAYS
4. TROUBLE RELAXING: NEARLY EVERY DAY
7. FEELING AFRAID AS IF SOMETHING AWFUL MIGHT HAPPEN: SEVERAL DAYS
2. NOT BEING ABLE TO STOP OR CONTROL WORRYING: MORE THAN HALF THE DAYS
1. FEELING NERVOUS, ANXIOUS, OR ON EDGE: MORE THAN HALF THE DAYS
GAD7 TOTAL SCORE: 10
6. BECOMING EASILY ANNOYED OR IRRITABLE: NOT AT ALL
5. BEING SO RESTLESS THAT IT IS HARD TO SIT STILL: NOT AT ALL

## 2020-03-17 ASSESSMENT — PATIENT HEALTH QUESTIONNAIRE - PHQ9: SUM OF ALL RESPONSES TO PHQ QUESTIONS 1-9: 18

## 2020-03-19 ENCOUNTER — OFFICE VISIT - HEALTHEAST (OUTPATIENT)
Dept: FAMILY MEDICINE | Facility: CLINIC | Age: 30
End: 2020-03-19

## 2020-03-19 DIAGNOSIS — G47.00 INSOMNIA, UNSPECIFIED TYPE: ICD-10-CM

## 2020-03-19 DIAGNOSIS — F41.9 ANXIETY: ICD-10-CM

## 2020-03-19 DIAGNOSIS — F10.21 ALCOHOL DEPENDENCE IN REMISSION (H): ICD-10-CM

## 2020-03-19 DIAGNOSIS — F32.1 MAJOR DEPRESSIVE DISORDER, SINGLE EPISODE, MODERATE (H): ICD-10-CM

## 2020-03-29 ENCOUNTER — COMMUNICATION - HEALTHEAST (OUTPATIENT)
Dept: FAMILY MEDICINE | Facility: CLINIC | Age: 30
End: 2020-03-29

## 2020-06-30 ENCOUNTER — COMMUNICATION - HEALTHEAST (OUTPATIENT)
Dept: FAMILY MEDICINE | Facility: CLINIC | Age: 30
End: 2020-06-30

## 2020-10-09 ENCOUNTER — COMMUNICATION - HEALTHEAST (OUTPATIENT)
Dept: FAMILY MEDICINE | Facility: CLINIC | Age: 30
End: 2020-10-09

## 2020-10-09 DIAGNOSIS — Z30.09 GENERAL COUNSELING AND ADVICE FOR CONTRACEPTIVE MANAGEMENT: ICD-10-CM

## 2020-11-16 ENCOUNTER — HEALTH MAINTENANCE LETTER (OUTPATIENT)
Age: 30
End: 2020-11-16

## 2021-01-11 ENCOUNTER — COMMUNICATION - HEALTHEAST (OUTPATIENT)
Dept: FAMILY MEDICINE | Facility: CLINIC | Age: 31
End: 2021-01-11

## 2021-01-11 DIAGNOSIS — Z30.09 GENERAL COUNSELING AND ADVICE FOR CONTRACEPTIVE MANAGEMENT: ICD-10-CM

## 2021-03-01 ENCOUNTER — OFFICE VISIT - HEALTHEAST (OUTPATIENT)
Dept: FAMILY MEDICINE | Facility: CLINIC | Age: 31
End: 2021-03-01

## 2021-03-01 DIAGNOSIS — R11.0 NAUSEA: ICD-10-CM

## 2021-03-01 DIAGNOSIS — F10.21 ALCOHOL DEPENDENCE IN REMISSION (H): ICD-10-CM

## 2021-03-01 DIAGNOSIS — F32.1 MAJOR DEPRESSIVE DISORDER, SINGLE EPISODE, MODERATE (H): ICD-10-CM

## 2021-03-01 DIAGNOSIS — R03.0 ELEVATED BP WITHOUT DIAGNOSIS OF HYPERTENSION: ICD-10-CM

## 2021-03-01 DIAGNOSIS — Z00.00 ROUTINE GENERAL MEDICAL EXAMINATION AT A HEALTH CARE FACILITY: ICD-10-CM

## 2021-03-01 DIAGNOSIS — F41.9 ANXIETY: ICD-10-CM

## 2021-03-01 LAB
ALBUMIN SERPL-MCNC: 4.4 G/DL (ref 3.5–5)
ALP SERPL-CCNC: 83 U/L (ref 45–120)
ALT SERPL W P-5'-P-CCNC: 29 U/L (ref 0–45)
ANION GAP SERPL CALCULATED.3IONS-SCNC: 10 MMOL/L (ref 5–18)
AST SERPL W P-5'-P-CCNC: 33 U/L (ref 0–40)
BILIRUB SERPL-MCNC: 0.8 MG/DL (ref 0–1)
BUN SERPL-MCNC: 9 MG/DL (ref 8–22)
CALCIUM SERPL-MCNC: 8.8 MG/DL (ref 8.5–10.5)
CHLORIDE BLD-SCNC: 106 MMOL/L (ref 98–107)
CHOLEST SERPL-MCNC: 187 MG/DL
CO2 SERPL-SCNC: 22 MMOL/L (ref 22–31)
CREAT SERPL-MCNC: 0.67 MG/DL (ref 0.6–1.1)
ERYTHROCYTE [DISTWIDTH] IN BLOOD BY AUTOMATED COUNT: 12.7 % (ref 11–14.5)
FASTING STATUS PATIENT QL REPORTED: YES
GFR SERPL CREATININE-BSD FRML MDRD: >60 ML/MIN/1.73M2
GLUCOSE BLD-MCNC: 97 MG/DL (ref 70–125)
HCT VFR BLD AUTO: 42.3 % (ref 35–47)
HDLC SERPL-MCNC: 76 MG/DL
HGB BLD-MCNC: 13.9 G/DL (ref 12–16)
LDLC SERPL CALC-MCNC: 88 MG/DL
MCH RBC QN AUTO: 31.9 PG (ref 27–34)
MCHC RBC AUTO-ENTMCNC: 32.9 G/DL (ref 32–36)
MCV RBC AUTO: 97 FL (ref 80–100)
PLATELET # BLD AUTO: 410 THOU/UL (ref 140–440)
PMV BLD AUTO: 9.2 FL (ref 7–10)
POTASSIUM BLD-SCNC: 3.9 MMOL/L (ref 3.5–5)
PROT SERPL-MCNC: 7.6 G/DL (ref 6–8)
RBC # BLD AUTO: 4.36 MILL/UL (ref 3.8–5.4)
SODIUM SERPL-SCNC: 138 MMOL/L (ref 136–145)
TRIGL SERPL-MCNC: 117 MG/DL
TSH SERPL DL<=0.005 MIU/L-ACNC: 2.2 UIU/ML (ref 0.3–5)
WBC: 8.1 THOU/UL (ref 4–11)

## 2021-03-01 ASSESSMENT — MIFFLIN-ST. JEOR: SCORE: 1675.69

## 2021-03-01 ASSESSMENT — PATIENT HEALTH QUESTIONNAIRE - PHQ9: SUM OF ALL RESPONSES TO PHQ QUESTIONS 1-9: 9

## 2021-03-02 LAB — 25(OH)D3 SERPL-MCNC: 33 NG/ML (ref 30–80)

## 2021-03-17 ENCOUNTER — COMMUNICATION - HEALTHEAST (OUTPATIENT)
Dept: FAMILY MEDICINE | Facility: CLINIC | Age: 31
End: 2021-03-17

## 2021-03-17 DIAGNOSIS — F51.01 PRIMARY INSOMNIA: ICD-10-CM

## 2021-03-22 ENCOUNTER — COMMUNICATION - HEALTHEAST (OUTPATIENT)
Dept: SCHEDULING | Facility: CLINIC | Age: 31
End: 2021-03-22

## 2021-03-23 ENCOUNTER — AMBULATORY - HEALTHEAST (OUTPATIENT)
Dept: LAB | Facility: CLINIC | Age: 31
End: 2021-03-23

## 2021-03-23 DIAGNOSIS — R00.0 TACHYCARDIA: ICD-10-CM

## 2021-03-27 LAB
COLLECT DURATION TIME SPEC: 24 HR
CREAT 24H UR-MRATE: 1062 MG/D (ref 700–1600)
CREAT UR-MCNC: 59 MG/DL
METANEPH 24H UR-MCNC: 47 UG/L
METANEPH 24H UR-MRATE: 85 UG/D (ref 36–229)
METANEPH+NORMETANEPH UR-IMP: NORMAL
METANEPH/CREAT 24H UR: 80 UG/G CRT (ref 0–300)
NORMETANEPHRINE 24H UR-MCNC: 103 UG/L
NORMETANEPHRINE 24H UR-MRATE: 185 UG/D (ref 95–650)
NORMETANEPHRINE/CREAT 24H UR: 175 UG/G CRT (ref 0–400)
SPECIMEN VOL ?TM UR: 1800 ML

## 2021-03-28 ENCOUNTER — COMMUNICATION - HEALTHEAST (OUTPATIENT)
Dept: CARDIOLOGY | Facility: CLINIC | Age: 31
End: 2021-03-28

## 2021-04-03 ENCOUNTER — HEALTH MAINTENANCE LETTER (OUTPATIENT)
Age: 31
End: 2021-04-03

## 2021-04-14 ENCOUNTER — COMMUNICATION - HEALTHEAST (OUTPATIENT)
Dept: FAMILY MEDICINE | Facility: CLINIC | Age: 31
End: 2021-04-14

## 2021-04-14 DIAGNOSIS — Z30.09 GENERAL COUNSELING AND ADVICE FOR CONTRACEPTIVE MANAGEMENT: ICD-10-CM

## 2021-05-26 ASSESSMENT — PATIENT HEALTH QUESTIONNAIRE - PHQ9
SUM OF ALL RESPONSES TO PHQ QUESTIONS 1-9: 11
SUM OF ALL RESPONSES TO PHQ QUESTIONS 1-9: 9

## 2021-05-27 ASSESSMENT — PATIENT HEALTH QUESTIONNAIRE - PHQ9
SUM OF ALL RESPONSES TO PHQ QUESTIONS 1-9: 18
SUM OF ALL RESPONSES TO PHQ QUESTIONS 1-9: 9

## 2021-05-28 ASSESSMENT — ANXIETY QUESTIONNAIRES
GAD7 TOTAL SCORE: 10
GAD7 TOTAL SCORE: 8
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
## 2021-05-28 NOTE — TELEPHONE ENCOUNTER
Refill Approved    Rx renewed per Medication Renewal Policy. Medication was last renewed on 4/11/18.    Jackelyn Katz, [facility] Triage/Med Refill 4/24/2019     Requested Prescriptions   Pending Prescriptions Disp Refills     JOLESSA 0.15 mg-30 mcg (91) per tablet [Pharmacy Med Name: JOLESSA             TAB] 91 tablet 3     Sig: TAKE 1 TABLET BY MOUTH ONCE DAILY       Oral Contraceptives Protocol Passed - 4/22/2019  1:18 PM        Passed - Visit with PCP or prescribing provider visit in last 12 months      Last office visit with prescriber/PCP: 4/11/2018 Billy Fowler MD OR same dept: 9/4/2018 Mary Dias MD OR same specialty: 9/4/2018 Mary Dias MD  Last physical: Visit date not found Last MTM visit: Visit date not found   Next visit within 3 mo: Visit date not found  Next physical within 3 mo: Visit date not found  Prescriber OR PCP: Billy Fowler MD  Last diagnosis associated with med order: 1. General counseling and advice for contraceptive management  - JOLESSA 0.15 mg-30 mcg (91) per tablet [Pharmacy Med Name: JOLESSA             TAB]; TAKE 1 TABLET BY MOUTH ONCE DAILY  Dispense: 91 tablet; Refill: 3    If protocol passes may refill for 12 months if within 3 months of last provider visit (or a total of 15 months).

## 2021-05-30 NOTE — PROGRESS NOTES
Assessment/ Plan     1. Major depressive disorder, single episode, moderate (H)  Patient has had significant worsening of depression and anxiety over the last couple of months.  She would like to restart medication.  As fluoxetine has worked well for her in the past, recommend we restart that.  He said in 20 mg tablets to take half a tab for the first week and then bump it up to full tablet.  Would then like to see her back in 3 weeks for recheck.  I did provide a work note to allow intermittent absences while we are trying to get this under control.  She would like me to fill out [facility] paperwork which they will fax to me at some point for some intermittent absences.  She denies suicidal thoughts at this point.  PHQ 9 equals 13, ROCKY 7 equals 11.  - FLUoxetine (PROZAC) 20 MG tablet; Take 1 tablet (20 mg total) by mouth daily.  Dispense: 30 tablet; Refill: 1    2. Anxiety  As discussed above.  - FLUoxetine (PROZAC) 20 MG tablet; Take 1 tablet (20 mg total) by mouth daily.  Dispense: 30 tablet; Refill: 1      Subjective:       Abimbola Monreal is a 29 y.o. female who presents for discussion of worsening depression and anxiety.  Patient states that things have been spiraling out of control over the last couple of months.  She had been taking fluoxetine over the winter and things have been under good control.  She weaned herself off it as she thought she did not need it anymore.  When I saw her in May for her physical, she felt that things were fairly stable.  She states that since then she has been missing a lot of work.  She has dropped some classes.  She feels like she will just shut down every other week and withdrawal.  She will then feel very anxious.  She is been feeling very shameful and guilty.  She has been using alcohol to self medicate.  She states she had friends come over this weekend and she would not even talk to them.  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  She has had so many absences that this  "has been an issue.  She has a strong family history of addiction and knows that she was purposely misusing alcohol.  She has been working with a therapist through work and met someone that just did not click with her.  She is trying to find another therapist.  She is not feeling suicidal.  She is wondering if I will fill out some [facility] paperwork for her as she has had some intermittent absences.  She states when things are really bad she just cannot function.  She states she is not feeling suicidal and does not have a plan.  She would like to restart medication.  As fluoxetine worked well for her in the past and she did not have any side effects, she would like to restart that.    Relevant past medical, family, surgical, and social history reviewed with patient, unless noted in HPI, not pertinent for this visit.  Medications were discussed and reconciled.   Review of Systems   A 12 point comprehensive review of systems was negative except as noted.      Current Outpatient Medications   Medication Sig Dispense Refill     cetirizine (ZYRTEC) 10 MG tablet Take 10 mg by mouth.       levonorgestrel-ethinyl estradiol (JOLESSA) 0.15 mg-30 mcg (91) per tablet Take 1 tablet by mouth daily. 91 tablet 3     FLUoxetine (PROZAC) 20 MG tablet Take 1 tablet (20 mg total) by mouth daily. 30 tablet 1     No current facility-administered medications for this visit.        Objective:      /84   Pulse (!) 120   Temp 98.7  F (37.1  C)   Resp 24   Ht 5' 9.5\" (1.765 m)   LMP  (LMP Unknown)   BMI 28.24 kg/m        General appearance: alert, appears stated age and cooperative  Good eye contact, very tearful and somewhat shaky  Lungs: clear to auscultation bilaterally  Heart: regular rate and rhythm, S1, S2 normal, no murmur, click, rub or gallop      No results found for this or any previous visit (from the past 168 hour(s)).       This note has been dictated using voice recognition software. Any grammatical or context " distortions are unintentional and inherent to the software

## 2021-05-30 NOTE — TELEPHONE ENCOUNTER
LMTCB    [employer] paperwork is done - she needs to sign SORIN before we can fax in. How would she like to handle this? Stop in the clinic? Fax?     Please gather info upon return call. Thanks!

## 2021-06-01 VITALS — BODY MASS INDEX: 27.25 KG/M2 | HEIGHT: 69 IN | WEIGHT: 184 LBS

## 2021-06-01 VITALS — HEIGHT: 69 IN | WEIGHT: 179.38 LBS | BODY MASS INDEX: 26.57 KG/M2

## 2021-06-01 VITALS — HEIGHT: 69 IN | WEIGHT: 186 LBS | BODY MASS INDEX: 27.55 KG/M2

## 2021-06-01 VITALS — WEIGHT: 181.5 LBS | BODY MASS INDEX: 26.88 KG/M2 | HEIGHT: 69 IN

## 2021-06-01 NOTE — PROGRESS NOTES
Assessment/ Plan     1. Major depressive disorder, single episode, moderate (H)  Patient comes in today to follow-up of depression and anxiety.  She was quite severe when she came in about 2 months ago.  She has been taking the fluoxetine 20 mg and feels like things have improved.  She still having some rougher days, mainly related to depression.  We discussed options and decided to increase the dose to 40 mg as that is what she had been on prior.  Then, after 3 to 4 weeks, if she still struggling would recommend adding Wellbutrin.  She is still struggling with sleep and doing some shift work.  Melatonin makes her very fatigued so we will have her try a half of Benadryl.  She is still occasionally missing days of work here and there.  She has her FMLA paper work filled out from her previous visit.  PHQ 9 equals 11 (down from 13).  ROCKY 7 equals 8 (down from 11).  - FLUoxetine (PROZAC) 40 MG capsule; Take 1 capsule (40 mg total) by mouth daily.  Dispense: 30 capsule; Refill: 6    2. Anxiety  As discussed above.  - FLUoxetine (PROZAC) 40 MG capsule; Take 1 capsule (40 mg total) by mouth daily.  Dispense: 30 capsule; Refill: 6      Subjective:       Abimbola Monreal is a 29 y.o. female who presents for follow-up depression and anxiety.  When I saw her in July she is having very severe symptoms.  She was missing work and self-medicating with alcohol.  I did fill out some FMLA paperwork for her for intermittent absences.  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  She states that things are definitely better.  Her irritability is much improved.  She is now used to her job and is loving it.  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  She is doing some shift work and this is been hard for sleep.  She tried melatonin, even at the 3 mg dose made her feel groggy the next day.  She still is struggling with depression however.  She states she will have 3 or 4 really good days and then have a bad day  "where she cannot get out of bed.  The depression then fuels her anxiety.  Where she will feel anxious about things that she should be doing.  She did look into therapy, but has high deductible insurance and is quite expensive.  She has been talking to her friends more and using them as a support system.  We discussed options today including increasing the dose.  She had previously been up to 40 mg when she stopped taking her medication and was doing quite well.  We also discussed adding Wellbutrin at some point if things are not improving.    Relevant past medical, family, surgical, and social history reviewed with patient, unless noted in HPI, not pertinent for this visit.  Medications were discussed and reconciled.   Review of Systems   A 12 point comprehensive review of systems was negative except as noted.      Current Outpatient Medications   Medication Sig Dispense Refill     cetirizine (ZYRTEC) 10 MG tablet Take 10 mg by mouth.       levonorgestrel-ethinyl estradiol (JOLESSA) 0.15 mg-30 mcg (91) per tablet Take 1 tablet by mouth daily. 91 tablet 3     FLUoxetine (PROZAC) 40 MG capsule Take 1 capsule (40 mg total) by mouth daily. 30 capsule 6     No current facility-administered medications for this visit.        Objective:      /78   Pulse 70   Temp 98.5  F (36.9  C) (Oral)   Resp 18   Ht 5' 9.5\" (1.765 m)   Wt 196 lb (88.9 kg)   BMI 28.53 kg/m        General appearance: alert, appears stated age and cooperative  Good eye contact and social smile, speech is normal in fluency and content, no tears today  Lungs: clear to auscultation bilaterally  Heart: regular rate and rhythm, S1, S2 normal, no murmur, click, rub or gallop      No results found for this or any previous visit (from the past 168 hour(s)).       This note has been dictated using voice recognition software. Any grammatical or context distortions are unintentional and inherent to the software  "

## 2021-06-02 VITALS — BODY MASS INDEX: 27.77 KG/M2 | HEIGHT: 70 IN | WEIGHT: 194 LBS

## 2021-06-03 VITALS
BODY MASS INDEX: 28.06 KG/M2 | TEMPERATURE: 98.5 F | RESPIRATION RATE: 18 BRPM | DIASTOLIC BLOOD PRESSURE: 78 MMHG | HEIGHT: 70 IN | HEART RATE: 70 BPM | WEIGHT: 196 LBS | SYSTOLIC BLOOD PRESSURE: 134 MMHG

## 2021-06-03 VITALS
HEIGHT: 70 IN | WEIGHT: 183 LBS | SYSTOLIC BLOOD PRESSURE: 114 MMHG | TEMPERATURE: 97.8 F | DIASTOLIC BLOOD PRESSURE: 62 MMHG | HEART RATE: 92 BPM | BODY MASS INDEX: 26.2 KG/M2

## 2021-06-03 VITALS — HEIGHT: 70 IN | BODY MASS INDEX: 28.24 KG/M2

## 2021-06-03 VITALS
BODY MASS INDEX: 26.92 KG/M2 | SYSTOLIC BLOOD PRESSURE: 104 MMHG | DIASTOLIC BLOOD PRESSURE: 62 MMHG | HEART RATE: 66 BPM | WEIGHT: 188 LBS | TEMPERATURE: 98.5 F | RESPIRATION RATE: 16 BRPM | HEIGHT: 70 IN

## 2021-06-03 NOTE — PROGRESS NOTES
Assessment/ Plan     1. Hospital discharge follow-up  Patient was hospitalized at [facility] from 10/13 through 10/30 for treatment of depression with suicidal ideation and alcoholism.  They did adjust some of her medications and increase her fluoxetine to 60 mg.  They added Lamictal.  She states she has been stable since discharge.  She has not made any follow-up appointments for counselors or groups.  She is currently living with her brother.  She works as an EMT.  I recommended that she call [facility] professional service program and gave her the intake number.  She is reluctant to do this, but I encouraged her.  I provided a work note to be off until reevaluated in 2 weeks.  In the meantime, recommend that she get her follow-up appointment scheduled.    2. Major depressive disorder, single episode, moderate (H)  Stable now on 60 mg of fluoxetine and 25 mg of Lamictal.  She will be increasing the dose to 50 mg this week per discharge instructions.  Recommend getting set up with therapy.    3. Anxiety  Under good control.    4. Alcohol abuse  Patient has remained stable at discharge, encouraged her to set up follow-up appointments.  As she is an EMT,  I recommend that she contact the [place] [facility] professional service program.      Subjective:       Abimbola Monreal is a 29 y.o. female who presents for hospital follow-up.  I had been seeing and treating the patient for depression and anxiety and we have been making some medication adjustments.  She did admit to doing some drinking, but did not admit to the full extent of what was happening.  She was admitted to the [facility] after her family essentially did an intervention.  They brought her into the emergency room as she was having severe depression with some suicidal thoughts.  She was intoxicated at the time.  She was stable, she was transferred for mental health treatment.  She was hospitalized for over 2 weeks.  She admits that she was  "drinking about 2 bottles of wine every day.  She and her  have  because of this.  Since discharge, she is been living with her brother and his family.  She really has not set up any follow-up appointments.  They did make some medication adjustments in the hospital they increased her fluoxetine to 60 mg.  They added Lamictal.  She did go through withdrawal for about the first 10 days or so.  We talked about her job as an EMT.  She was working 7 days on and then 7 days off.  She states that she would binge drink the 7-day she was off.  Then when she was working, she would drink a bottle of wine at night and then \"sleep it off\" and then go to work.  I discussed with her the [place] [employer] professional service program.  She felt that she really does not need it.  I asked her to look back to before the admission and think about if she thinks that it was a safe thing for her to be doing, drinking so much and then going to work.  Even though she was not intoxicated at work, she was likely having some withdrawal.  She is wanting to return to work on Monday.  At this point, I do not think she is ready to return.  She has not set up any follow-up appointments.  We discussed following up in 2 weeks and then at that point I will make a decision about return to work.  I encouraged her to get some follow-up appointment set up.    Post Discharge Medication Reconciliation Status: discharge medications reconciled and changed, per note/orders (see AVS)      Relevant past medical, family, surgical, and social history reviewed with patient, unless noted in HPI, not pertinent for this visit.  Medications were discussed and reconciled.   Review of Systems   A 12 point comprehensive review of systems was negative except as noted.      Current Outpatient Medications   Medication Sig Dispense Refill     cetirizine (ZYRTEC) 10 MG tablet Take 10 mg by mouth.       cholecalciferol, vitamin D3, (VITAMIN D3) 2,000 unit capsule " "Take 1,000 Units by mouth daily. 4,000 IU daily per pt       FLUoxetine (PROZAC) 20 MG capsule Take 60 mg by mouth.       melatonin 1 mg Tab tablet Take 1 tablet by mouth.       multivit-mins no.63/iron/folic (M-VIT ORAL) Take by mouth.       omega-3/dha/epa/fish oil (FISH OIL-OMEGA-3 FATTY ACIDS) 300-1,000 mg capsule Take 2 g by mouth daily.       FLUoxetine (PROZAC) 40 MG capsule Take 1 capsule (40 mg total) by mouth daily. 30 capsule 6     lamoTRIgine (LAMICTAL) 25 MG tablet Take 2 tablets (50 mg total) by mouth daily. 60 tablet 2     levonorgestrel-ethinyl estradiol (JOLESSA) 0.15 mg-30 mcg (91) per tablet Take 1 tablet by mouth daily. 91 tablet 3     No current facility-administered medications for this visit.        Objective:      /62   Pulse 92   Temp 97.8  F (36.6  C) (Oral)   Ht 5' 9.5\" (1.765 m)   Wt 183 lb (83 kg)   BMI 26.64 kg/m        General appearance: alert, appears stated age and cooperative  Good eye contact, no social smile, flat affect  Lungs: clear to auscultation bilaterally  Heart: regular rate and rhythm, S1, S2 normal, no murmur, click, rub or gallop  Extremities: Hands are slightly shaky    No results found for this or any previous visit (from the past 168 hour(s)).       This note has been dictated using voice recognition software. Any grammatical or context distortions are unintentional and inherent to the software  "

## 2021-06-03 NOTE — PROGRESS NOTES
Assessment/ Plan     1. Major depressive disorder, single episode, moderate (H)  Patient's depression and anxiety seems to be under better control.  2-week follow-up after being in the hospital.  She is on 60 mg of fluoxetine and 50 mg of Lamictal.  She has a follow-up counseling appointment later today.  She states she has not drank any alcohol since discharge.  She feels like she is ready to go back to work.  We will have her return to work without restrictions on November 26.  Follow-up with me in 4 to 6 weeks unless things are worsening, would want to see her sooner.    2. Anxiety    3. Alcohol dependence in remission (H)        Subjective:       Abimbola Monreal is a 29 y.o. female who presents for two-week follow-up.  When I saw her last, she had just been out of the hospital for depression, anxiety, and alcohol abuse.  She wanted to return to work, but I did not think she was ready.  She did not have any follow-up plan or anything scheduled.  She feels like the last 2 weeks have been better.  She does feel like she is in control of her emotions better.  She just found out last night that her  does not want to do counseling, he just wants to end things.  She is feeling like she wants to return to work.  She has not drank any alcohol since discharge.  She is not having any cravings.  She has not felt like she wanted to drink.  After she talked to her  last night, she went over to her friends and hung out and that helped.  She has a lot of friends that do not drink alcohol.  We discussed follow-up plans and we decided on 4 to 6 weeks for follow-up.  Certainly, if things are worsening, she can come in sooner.    Relevant past medical, family, surgical, and social history reviewed with patient, unless noted in HPI, not pertinent for this visit.  Medications were discussed and reconciled.   Review of Systems   A 12 point comprehensive review of systems was negative except as noted.      Current  "Outpatient Medications   Medication Sig Dispense Refill     cetirizine (ZYRTEC) 10 MG tablet Take 10 mg by mouth.       cholecalciferol, vitamin D3, (VITAMIN D3) 2,000 unit capsule Take 1,000 Units by mouth daily. 4,000 IU daily per pt       FLUoxetine (PROZAC) 20 MG capsule Take 60 mg by mouth.       FLUoxetine (PROZAC) 40 MG capsule Take 1 capsule (40 mg total) by mouth daily. 30 capsule 6     lamoTRIgine (LAMICTAL) 25 MG tablet Take 2 tablets (50 mg total) by mouth daily. 60 tablet 2     levonorgestrel-ethinyl estradiol (JOLESSA) 0.15 mg-30 mcg (91) per tablet Take 1 tablet by mouth daily. 91 tablet 3     melatonin 1 mg Tab tablet Take 1 tablet by mouth.       multivit-mins no.63/iron/folic (M-VIT ORAL) Take by mouth.       omega-3/dha/epa/fish oil (FISH OIL-OMEGA-3 FATTY ACIDS) 300-1,000 mg capsule Take 2 g by mouth daily.       No current facility-administered medications for this visit.        Objective:      /62   Pulse 66   Temp 98.5  F (36.9  C) (Oral)   Resp 16   Ht 5' 9.5\" (1.765 m)   Wt 188 lb (85.3 kg)   BMI 27.36 kg/m        General appearance: alert, appears stated age and cooperative  Good eye contact and social smile, speech is normal in fluency and content  Lungs: clear to auscultation bilaterally  Heart: regular rate and rhythm, S1, S2 normal, no murmur, click, rub or gallop    No results found for this or any previous visit (from the past 168 hour(s)).       This note has been dictated using voice recognition software. Any grammatical or context distortions are unintentional and inherent to the software  "

## 2021-06-03 NOTE — TELEPHONE ENCOUNTER
Controlled Substance Refill Request  Medication Name:   Requested Prescriptions     Pending Prescriptions Disp Refills     lamoTRIgine (LAMICTAL) 25 MG tablet  0     Sig: Take 1 tablet (25 mg total) by mouth.     Date Last Fill: 10/30/19  Pharmacy: [facility] [place], [place] [facility]       Submit electronically to pharmacy  Controlled Substance Agreement Date Scanned:   Encounter-Level CSA Scan Date:    There are no encounter-level csa scan date.       Last office visit with prescriber/PCP: 11/7/2019 Mary Dias MD OR same dept: 11/7/2019 Mary Dias MD OR same specialty: 11/7/2019 Mary Dias MD  Last physical: 5/2/2019 Last MTM visit: Visit date not found      Patient states dose was changed 11/03/19 at [place] in [facility]. new dose is 50mg daily she needs direction changed on script and sent to pharmacy.

## 2021-06-04 NOTE — TELEPHONE ENCOUNTER
Dr. Dias-  See pt's mychart message. I have queued up the rx, most likey insurance will not cover as it is too soon to fill.

## 2021-06-05 VITALS
HEART RATE: 95 BPM | DIASTOLIC BLOOD PRESSURE: 82 MMHG | WEIGHT: 197.6 LBS | BODY MASS INDEX: 29.27 KG/M2 | RESPIRATION RATE: 16 BRPM | SYSTOLIC BLOOD PRESSURE: 138 MMHG | HEIGHT: 69 IN

## 2021-06-05 NOTE — TELEPHONE ENCOUNTER
Dr. Dias-  See pt's mychart refill request.  Last OV on 11/21/19 states:    Major depressive disorder, single episode, moderate (H)  Patient's depression and anxiety seems to be under better control.  2-week follow-up after being in the hospital.  She is on 60 mg of fluoxetine and 50 mg of Lamictal.      Rx queued for MD approval.

## 2021-06-07 NOTE — PROGRESS NOTES
"Abimbola Monreal is a 29 y.o. female who is being evaluated via a billable telephone visit.      The patient has been notified of following:     \"This telephone visit will be conducted via a call between you and your physician/provider. We have found that certain health care needs can be provided without the need for a physical exam.  This service lets us provide the care you need with a short phone conversation.  If a prescription is necessary we can send it directly to your pharmacy.  If lab work is needed we can place an order for that and you can then stop by our lab to have the test done at a later time.    If during the course of the call the physician/provider feels a telephone visit is not appropriate, you will not be charged for this service.\"         Abimbola Monreal complains of    Chief Complaint   Patient presents with     Depression       I have reviewed and updated the patient's Past Medical History, Social History, Family History and Medication List.    ALLERGIES  Rudolph Polk (MA signature)    Additional provider notes:   Abimbola is a 29-year-old woman who has been dealing with some depression, anxiety, and alcohol abuse.  When I last saw her, she was doing fairly well.  She had been sober and her depression and anxiety had been stable.  When I talked to her today, she states that she feels like the last month or so things have worsened again.  She feels like her depression and anxiety are not under good control.  She had 3 episodes where she used alcohol.  She states that she just felt sick when she use alcohol.  She did reach out to friends and family right away.  She is not seeing a counselor currently as she did not feel like they clicked very well.  She felt like it was very impersonal.  She is going through a divorce and living at her brother's house.  She states she \"just wants my life back\".  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  She is not " sure if she wants to keep going on a regular basis.  She has cut back her hours.  She does feel like she is not can to be drinking anymore.  She not specifically having any cravings.  We talked about her medication.  She feels like the low back dull is not doing anything.  This was started when she was inpatient.  She is also taking 60 mg of fluoxetine which I discussed with her was the maximum dose.  She feels like she is really not sleeping well.  She will fall asleep okay but then she wakes up multiple times.  She is tried Benadryl and melatonin over-the-counter without much success.  She does have an FMLA so that she can have intermittent missed work.  She is not sure if this needs to be updated but will check with her employer.  If it does need to be updated she will contact me.  After discussion of options today, we mutually decided to start trazodone for sleep.  She will start at 50 mg.  If no improvement over 2 to 3 days can increase to 100 mg.  As far as her depression and anxiety, she will wean down off the Lamictal as she feels she is not getting any benefit from that.  We will then start Wellbutrin 150 mg.  I did discuss with her that this can lower the seizure threshold and can be a big risk if she starts drinking again.  She feels very strongly that she will not be drinking again.  She would like to try it even understanding the risks.    Assessment/Plan:  1. Major depressive disorder, single episode, moderate (H)  buPROPion (WELLBUTRIN XL) 150 MG 24 hr tablet   2. Insomnia, unspecified type  traZODone (DESYREL) 50 MG tablet   3. Anxiety     4. Alcohol dependence in remission (H)        After discussion of options with patient today, we will have her start trazodone 50 mg at bedtime for the insomnia.  If no improvement in 2 to 3 days, can increase to 100 mg.  We will have her wean off the Lamictal over several days.  She will go down to 25 mg for several days and then off.  She can then start  Wellbutrin 150 mg in the morning.  I did review with her potential adverse side effects including lowering the seizure threshold.  We will then plan on having another telephone visit in 3 to 4 weeks.  At that time can increase the Wellbutrin if she is getting some benefit.  She will check with her employer to see if she needs updated FMLA paperwork filled out.  I strongly encouraged her to reach out to friends and family daily to avoid isolation.  She can send me a Twitsalet message if she has questions or worries in the meantime.    I have reviewed the note as documented above.  This accurately captures the substance of my conversation with the patient.  Mary Dias      Phone call contact time    Call Started at: 7:54  Call Ended at: 8:11      Signature:  Mary Dias

## 2021-06-12 NOTE — TELEPHONE ENCOUNTER
Refill Approved    Rx renewed per Medication Renewal Policy. Medication was last renewed on 12/12/19.    Jackelyn Katz, [facility] [place] Connection Triage/Med Refill 10/12/2020     Requested Prescriptions   Pending Prescriptions Disp Refills     levonorgestrel-ethinyl estradiol (JOLESSA) 0.15 mg-30 mcg (91) per tablet 28 tablet 0     Sig: Take 1 tablet by mouth daily.       Oral Contraceptives Protocol Passed - 10/9/2020  8:25 PM        Passed - Visit with PCP or prescribing provider visit in last 12 months      Last office visit with prescriber/PCP: 4/11/2018 Billy Fowler MD OR same dept: 11/21/2019 Mary Dias MD OR same specialty: 11/21/2019 Mary Dias MD  Last physical: Visit date not found Last MTM visit: Visit date not found   Next visit within 3 mo: Visit date not found  Next physical within 3 mo: Visit date not found  Prescriber OR PCP: Billy Fowler MD  Last diagnosis associated with med order: 1. General counseling and advice for contraceptive management  - levonorgestrel-ethinyl estradiol (JOLESSA) 0.15 mg-30 mcg (91) per tablet; Take 1 tablet by mouth daily.  Dispense: 28 tablet; Refill: 0    If protocol passes may refill for 12 months if within 3 months of last provider visit (or a total of 15 months).

## 2021-06-14 NOTE — TELEPHONE ENCOUNTER
Refill Approved    Rx renewed per Medication Renewal Policy. Medication was last renewed on 10/12/20.    Jackelyn Katz, [facility] Connection Triage/Med Refill 1/12/2021     Requested Prescriptions   Pending Prescriptions Disp Refills     levonorgestrel-ethinyl estradiol (SEASONALE) 0.15 mg-30 mcg (91) per tablet [Pharmacy Med Name: LEVONOR-ETH ESTRAD 0.15-0.03] 91 tablet 0     Sig: TAKE 1 TABLET BY MOUTH EVERY DAY       Oral Contraceptives Protocol Passed - 1/11/2021  1:30 PM        Passed - Visit with PCP or prescribing provider visit in last 12 months      Last office visit with prescriber/PCP: 4/11/2018 Billy Fowler MD OR same dept: Visit date not found OR same specialty: 11/21/2019 Mary Dias MD  Last physical: Visit date not found Last MTM visit: Visit date not found   Next visit within 3 mo: Visit date not found  Next physical within 3 mo: Visit date not found  Prescriber OR PCP: Billy Fowler MD  Last diagnosis associated with med order: 1. General counseling and advice for contraceptive management  - levonorgestrel-ethinyl estradiol (SEASONALE) 0.15 mg-30 mcg (91) per tablet [Pharmacy Med Name: LEVONOR-ETH ESTRAD 0.15-0.03]; TAKE 1 TABLET BY MOUTH EVERY DAY  Dispense: 91 tablet; Refill: 0    If protocol passes may refill for 12 months if within 3 months of last provider visit (or a total of 15 months).

## 2021-06-15 NOTE — PROGRESS NOTES
Assessment/ Plan     1. Routine general medical examination at a health care facility  Abimbola presents for her annual exam.  As far as healthcare maintenance, she had a normal Pap smear in May 2019.  We will do fasting blood work today.  She is up-to-date on immunizations.  She is getting regular exercise and quit smoking.  - Comprehensive Metabolic Panel  - HM2(CBC w/o Differential)  - Lipid Cascade  - Thyroid Stimulating Hormone (TSH)  - Vitamin D, Total (25-Hydroxy)    2. Major depressive disorder, single episode, moderate (H)  Stable, currently off all medications.  PHQ-9 equals 9.  Most symptoms are related to poor sleep.  We discussed options today and she is going to work on sleep hygiene.    3. Anxiety  She does feel that her anxiety is up a little bit more recently.  She does not want to go on any medications at this point.    4. Alcohol dependence in remission (H)  This is completely in remission.    5. Nausea  She had some nausea starting approximately 5 days ago with what sounds like viral gastroenteritis.  She did take a home pregnancy test which was negative.  We will use Zofran over the next several days until hopefully symptoms resolve on their own.  She is well-hydrated.  - ondansetron (ZOFRAN) 4 MG tablet; Take 1 tablet (4 mg total) by mouth daily as needed for nausea.  Dispense: 30 tablet; Refill: 1    6. Elevated BP without diagnosis of hypertension  She had mildly elevated blood pressure today.  She does have family history in her mother.  She did quit smoking and cut back on caffeine.  She is trying to exercise regularly.  Would recommend that she go off her OCPs and see if her blood pressures come down.  She can monitor them at work and let me know through my chart.      Subjective:     Abimbola Monreal is a 30 y.o. female who presents for an annual exam.  Overall, she states she is doing much better than last year.  She had been struggling with some personal issues causing depression, anxiety,  and alcohol overuse.  She has since been through divorce and bought a new house.  She feels like she is in a healthier place and weaned herself off her depression and anxiety medication.  She is struggling a little bit with sleep but was doing some double shifts and admits probably not doing good sleep hygiene.  She does occasionally take melatonin.  She does not want to go on any prescriptions.  About 5 days ago she started with some nausea and vomiting.  She had some diarrhea.  She then the next day had a sharp pain on her side and had some spotting and passed of blood clot vaginally.  She takes Seasonale and is fairly new into her new packet.  She typically does not spot from this.  She did take a home pregnancy test which was negative.  She does feel like things are off at this point.  She still having nausea and has not been eating much.  She is drinking lots of fluids and keeping hydrated.  She not having any more abdominal pain or epigastric pain.  She is no longer drinking.  She quit smoking.  She cut back on caffeine and is exercising.  She does have a family history of hypertension in her mother.  She is not had headaches or other symptoms.    Healthy Habits:   Regular Exercise: No  Sunscreen Use: Yes  Healthy Diet: No  Dental Visits Regularly: Yes  Seat Belt: Yes  Sexually active: Yes  Self Breast Exam Monthly:Yes  Colonoscopy: No  Lipid Profile: Yes  Glucose Screen: Yes  Prevention of Osteoporosis: No  Last Dexa: No        Immunization History   Administered Date(s) Administered     Dtap 1990, 1990, 06/06/1991, 11/07/1991, 02/02/1995     Hep B, historic 01/02/1997, 09/01/2005, 08/28/2015     Hib (PRP-T) 1990, 06/06/1991     INFLUENZA,SEASONAL QUAD, PF, =/> 6months 11/10/2017, 12/12/2017, 10/29/2019     IPV 1990, 1990, 11/07/1991, 02/02/1995     Influenza, inj, historic,unspecified 10/05/2020     MMR 09/05/1991, 09/01/2005     Td, adult adsorbed, PF 09/01/2005     Tdap  2015     Immunization status: up to date and documented.     Gynecologic History  No LMP recorded (lmp unknown). (Menstrual status: Contraception).  Contraception: OCP (estrogen/progesterone)  Last Pap: . Results were: normal        OB History    Para Term  AB Living     0           SAB TAB Ectopic Multiple Live Births                   Current Outpatient Medications   Medication Sig Dispense Refill     cetirizine (ZYRTEC) 10 MG tablet Take 10 mg by mouth.       cholecalciferol, vitamin D3, (VITAMIN D3) 2,000 unit capsule Take 1,000 Units by mouth daily. 4,000 IU daily per pt       levonorgestrel-ethinyl estradiol (SEASONALE) 0.15 mg-30 mcg (91) per tablet TAKE 1 TABLET BY MOUTH EVERY DAY 91 tablet 0     melatonin 1 mg Tab tablet Take 1 tablet by mouth.       multivit-mins no.63/iron/folic (M-VIT ORAL) Take by mouth.       omega-3/dha/epa/fish oil (FISH OIL-OMEGA-3 FATTY ACIDS) 300-1,000 mg capsule Take 2 g by mouth daily.       ondansetron (ZOFRAN) 4 MG tablet Take 1 tablet (4 mg total) by mouth daily as needed for nausea. 30 tablet 1     No current facility-administered medications for this visit.      Past Medical History:   Diagnosis Date     Pyelonephritis     Hospitalized     Past Surgical History:   Procedure Laterality Date     ear cyst      as infant     skin grafts      as infant     Codeine  Family History   Problem Relation Age of Onset     Hypertension Mother      Gout Mother      Alcohol abuse Father      Alcohol abuse Brother      Hypertension Maternal Grandmother      Heart disease Maternal Grandmother      Social History     Socioeconomic History     Marital status: Single     Spouse name: Not on file     Number of children: Not on file     Years of education: Not on file     Highest education level: Not on file   Occupational History     Not on file   Social Needs     Financial resource strain: Not on file     Food insecurity     Worry: Not on file     Inability: Not on  "file     Transportation needs     Medical: Not on file     Non-medical: Not on file   Tobacco Use     Smoking status: Former Smoker     Packs/day: 1.00     Years: 5.00     Pack years: 5.00     Smokeless tobacco: Never Used   Substance and Sexual Activity     Alcohol use: Yes     Comment: occasional     Drug use: Not on file     Sexual activity: Yes     Partners: Male     Birth control/protection: OCP   Lifestyle     Physical activity     Days per week: Not on file     Minutes per session: Not on file     Stress: Not on file   Relationships     Social connections     Talks on phone: Not on file     Gets together: Not on file     Attends Religion service: Not on file     Active member of club or organization: Not on file     Attends meetings of clubs or organizations: Not on file     Relationship status: Not on file     Intimate partner violence     Fear of current or ex partner: Not on file     Emotionally abused: Not on file     Physically abused: Not on file     Forced sexual activity: Not on file   Other Topics Concern     Not on file   Social History Narrative     Not on file       Review of Systems  General:  Denies problems  Eyes:  Denies problems  Ears/Nose/Throat:  Denies problems  Cardiovascular:  Denies problems  Respiratory:  Denies problems  Gastrointestinal:  Denies problems  Genitourinary:  Denies problems  Musculoskeletal:  Denies problems  Skin:  Denies problems  Neurologic:  Denies problems  Psychiatric:  Denies problems  Endocrine:  Denies problems  Heme/Lymphatic:  Denies problems  Allergic/Immunologic:  Denies problems    Objective:      Vitals:    03/01/21 1439 03/01/21 1507   BP: (!) 160/104 138/82   Pulse: 95    Resp: 16    Weight: 197 lb 9.6 oz (89.6 kg)    Height: 5' 9\" (1.753 m)        Physical Exam:  General Appearance: Alert, cooperative, no distress, appears stated age  Head: Normocephalic, without obvious abnormality, atraumatic  Eyes: PERRL, conjunctiva/corneas clear, EOM's " intact  Ears: Normal TM's and external ear canals, both ears  Nose: Nares normal, septum midline,mucosa normal, no drainage  Throat: Lips, mucosa, and tongue normal; teeth and gums normal  Neck: Supple, symmetrical, trachea midline, no adenopathy; thyroid: not enlarged, symmetric, no tenderness/mass/nodules; no carotid bruit  Back: Symmetric, no curvature, ROM normal, no CVA tenderness  Lungs: Clear to auscultation bilaterally, respirations unlabored  Breasts: No breast masses, tenderness, asymmetry, or nipple discharge  Heart: Regular rate and rhythm, S1 and S2 normal, no murmur, rub, or gallop, Abdomen: Soft, non-tender, bowel sounds active all four quadrants,  no masses, no organomegaly  Pelvic: Normally developed genitalia with no external lesions or eruptions. Vagina and cervix show no lesions, inflammation, discharge or tenderness. Uterus normal to palpation.  No adnexal mass or tenderness.  Extremities: Extremities normal, atraumatic, no cyanosis or edema  Skin: Skin color, texture, turgor normal, no rashes or lesions  Lymph nodes: Cervical, supraclavicular, and axillary nodes normal  Neurologic: Normal            Results for orders placed or performed in visit on 03/01/21   Comprehensive Metabolic Panel   Result Value Ref Range    Sodium 138 136 - 145 mmol/L    Potassium 3.9 3.5 - 5.0 mmol/L    Chloride 106 98 - 107 mmol/L    CO2 22 22 - 31 mmol/L    Anion Gap, Calculation 10 5 - 18 mmol/L    Glucose 97 70 - 125 mg/dL    BUN 9 8 - 22 mg/dL    Creatinine 0.67 0.60 - 1.10 mg/dL    GFR MDRD Af Amer >60 >60 mL/min/1.73m2    GFR MDRD Non Af Amer >60 >60 mL/min/1.73m2    Bilirubin, Total 0.8 0.0 - 1.0 mg/dL    Calcium 8.8 8.5 - 10.5 mg/dL    Protein, Total 7.6 6.0 - 8.0 g/dL    Albumin 4.4 3.5 - 5.0 g/dL    Alkaline Phosphatase 83 45 - 120 U/L    AST 33 0 - 40 U/L    ALT 29 0 - 45 U/L   HM2(CBC w/o Differential)   Result Value Ref Range    WBC 8.1 4.0 - 11.0 thou/uL    RBC 4.36 3.80 - 5.40 mill/uL    Hemoglobin  13.9 12.0 - 16.0 g/dL    Hematocrit 42.3 35.0 - 47.0 %    MCV 97 80 - 100 fL    MCH 31.9 27.0 - 34.0 pg    MCHC 32.9 32.0 - 36.0 g/dL    RDW 12.7 11.0 - 14.5 %    Platelets 410 140 - 440 thou/uL    MPV 9.2 7.0 - 10.0 fL   Lipid Cascade   Result Value Ref Range    Cholesterol 187 <=199 mg/dL    Triglycerides 117 <=149 mg/dL    HDL Cholesterol 76 >=50 mg/dL    LDL Calculated 88 <=129 mg/dL    Patient Fasting > 8hrs? Yes    Thyroid Stimulating Hormone (TSH)   Result Value Ref Range    TSH 2.20 0.30 - 5.00 uIU/mL   Vitamin D, Total (25-Hydroxy)   Result Value Ref Range    Vitamin D, Total (25-Hydroxy) 33.0 30.0 - 80.0 ng/mL       Mary Dias MD    This note has been dictated using voice recognition software. Any grammatical or context distortions are unintentional and inherent to the software

## 2021-06-16 NOTE — TELEPHONE ENCOUNTER
levonorgestrel-ethinyl estradiol (SEASONALE) 0.15 mg-30 mcg (91) per tablet [[id number]]    Electronically signed by: Jackelyn Katz RN on 01/12/21 1040 Status: Discontinued   Ordering user: Jackelyn Katz RN 01/12/21 1040 Ordering provider: Mary Dias MD   Authorized by: Mary Dias MD   Frequency:  01/12/21 - 03/22/21  Released by: Jackelyn Katz RN 01/12/21 1040   Discontinued by: Siri Gutierrez MD 03/22/21 1554 [Stop Taking at Discharge]   Diagnoses   General counseling and advice for contraceptive management [Z30.09]

## 2021-06-17 NOTE — PROGRESS NOTES
"  Chief Complaint   Patient presents with     Medication Education Visit         HPI:   Abimbola Monreal is a 28 y.o. female in to refill OCP.    On Tri sprintec since age 15.  She states she gets irritative urinary symptoms for a couple of days during period.  Has tried switching tampons, etc. Uses AZO for symptoms.  Last pap was in 2015 or later.  No h/o abnormal paps.  No plan for pregnancy for at least 2 years.      ROS:  A 10 point comprehensive review of systems was negative except as noted.     Medications:  No current outpatient prescriptions on file prior to visit.     No current facility-administered medications on file prior to visit.          Social History:  Social History   Substance Use Topics     Smoking status: Never Smoker     Smokeless tobacco: Never Used     Alcohol use Not on file         Physical Exam:   Vitals:    04/11/18 0839   BP: 118/78   Pulse: 84   Resp: 16   Temp: 98.2  F (36.8  C)   TempSrc: Oral   Weight: 179 lb 6 oz (81.4 kg)   Height: 5' 8.75\" (1.746 m)       GEN:  NAD  LUNGS:  Clear to auscultation without wheezing.  Normal effort.  HEART:  RRR without murmur, rub or gallop         Assessment/Plan:    1. General counseling and advice for contraceptive management  levonorgestrel-ethinyl estradiol (SEASONALE) 0.15 mg-30 mcg per tablet      Discussed progesterone only methods of birth control.  She would like to stay on combination method.  Discussed extended use of OCs.  Trial of seasonale.  Recheck for problems.  She will ascertain when her last pap smear was.  Needs to have it done three years after the last.            Billy Fowler MD      4/11/2018        "

## 2021-06-19 NOTE — PROGRESS NOTES
"Assessment/ Plan     1. Major depression, recurrent (H)  Patient is suffering from major depression and anxiety.  After discussion of options, she would like to try medication.  She would like to start with a low dose.  As she may start a family in the next couple of years, will choose sertraline.  Will start at 25 mg and have her follow-up in 3-4 weeks for recheck.  Reviewed potential side effects.  She will consider therapy and we can talk about this when she follows up.  Recommend she continue with the self-help things that she has started such as exercise in a workbook that she is doing.  PHQ9 equals 14.  - sertraline (ZOLOFT) 25 MG tablet; Take 1 tablet (25 mg total) by mouth daily.  Dispense: 30 tablet; Refill: 6      Subjective:       Abimbola Monreal is a 28 y.o. female who presents for establish care and treatment for depression.  This patient is new to me today.  She states that it was really hard for her to come in as a first step.  She states that she suffered from depression on and off over the years.  She has never taken medication for it.  She has been prescribed in the past, but just never filled the prescription.  She states that she has been feeling this bad all summer.  She states with her she tends to have a couple months where she is feeling sad and down and then she will feel \"normal\" for a month or so.  She describes these episodes as manic but the more we talk about it, it would be considered a normal happy mood.  She is not having episodes where she is having too much energy, not needing to sleep, feeling euphoric, or being impulsive.  She states that she has been crying a lot.  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  She states that it is all positive things, but she is wondering if maybe the stress has triggered this.  She has noticed a loss of interest in fun things..  She is feeling sad or down.  She feels like she could sleep a lot.  She is " working overnight and then tries to transition to regular days when she is off.  She feels bad about herself and really guilty about things.  She denies any suicidal thoughts.  She does notice some issues with concentration.  She is  and she states that her  is good support system.  1 of her younger brothers has had issues with depression and another has had substance abuse issues.  Her father was an alcoholic and had some depression issues.  She states she had a really tough childhood.  She also feels really anxious and on edge most of the time.  She denies any substance abuse issues.  She states sometimes she will have a glass of wine with dinner, but typically never has more than 1 or has had any issues with it.  She does not use any street drugs.  She is a non-smoker.  She is taking steps to try to help herself.  She states she has a couple of workbook that she is working through on depression and anxiety.  She recently joined the gym and got a .  She is afraid of starting medications.  She does not want to change her personality.  We discussed all options today.  They are thinking about maybe starting a family in the next couple of years.  She is also worried about being on something long-term.  We discussed that typically begin people feeling better and then have them stay on it for 6 months.  If at that point they want to wean off they can do a trial.  She prefers to start with something really low dose and go slowly with increases.    Relevant past medical, family, surgical, and social history reviewed with patient, unless noted in HPI, not pertinent for this visit.    Review of Systems   A 12 point comprehensive review of systems was negative except as noted.      Current Outpatient Prescriptions   Medication Sig Dispense Refill     cetirizine (ZYRTEC) 10 MG tablet Take 10 mg by mouth.       CRANBERRY FRUIT EXTRACT (CRANBERRY CONCENTRATE ORAL) Take by mouth as needed.         "levonorgestrel-ethinyl estradiol (SEASONALE) 0.15 mg-30 mcg per tablet Take 1 tablet by mouth daily. 1 Package 3     multivitamin therapeutic tablet Take 1 tablet by mouth daily.       PUMPKIN SEED EXTRACT/SOY GERM (AZO BLADDER CONTROL ORAL) Take by mouth as needed.        sertraline (ZOLOFT) 25 MG tablet Take 1 tablet (25 mg total) by mouth daily. 30 tablet 6     No current facility-administered medications for this visit.        Objective:      /82  Pulse (!) 104  Temp 98.6  F (37  C) (Oral)   Resp 16  Ht 5' 8.75\" (1.746 m)  Wt 184 lb (83.5 kg)  LMP 07/23/2018  BMI 27.37 kg/m2      General appearance: alert, appears stated age and cooperative  Good eye contact, social smile, tearful throughout discussion    No results found for this or any previous visit (from the past 168 hour(s)).       This note has been dictated using voice recognition software. Any grammatical or context distortions are unintentional and inherent to the software  "

## 2021-06-20 NOTE — LETTER
Letter by Mary Dias MD at      Author: Mary Dias MD Service: -- Author Type: --    Filed:  Encounter Date: 3/29/2020 Status: (Other)         April 9, 2020     Patient: Abimbola Monreal   YOB: 1990   Date of Visit: 3/29/2020       To Whom It May Concern:    It is my medical opinion that Abimbola Monreal may return to work on 4/10/20. [facility] paperwork was submitted for intermittent absences.    If you have any questions or concerns, please don't hesitate to call.    Sincerely,        Electronically signed by Mary Dias MD

## 2021-06-20 NOTE — PROGRESS NOTES
Assessment/ Plan     1. Major depressive disorder, single episode, moderate (H)  She has had some mild improvement with the fluoxetine 10 mg.  She has been less irritable and seems to be crying less.  We discussed options and we decided to go up on the dosage to 20 mg.  She is tolerating it well without adverse side effects.  We did discuss possibly looking into therapy.  She will follow-up with me in 4-6 weeks.  - FLUoxetine (PROZAC) 20 MG tablet; Take 1 tablet (20 mg total) by mouth daily.  Dispense: 30 tablet; Refill: 1    2. Anxiety  - FLUoxetine (PROZAC) 20 MG tablet; Take 1 tablet (20 mg total) by mouth daily.  Dispense: 30 tablet; Refill: 1      Subjective:       Abimbola Monreal is a 28 y.o. female who presents for follow-up of depression and anxiety.  We initially started with sertraline but she did not tolerate it well.  We then switched to fluoxetine.  She states that she is tolerating it well without any adverse side effects.  She feels like she still having a tough time.  She felt like she was isolating herself quite a bed and not going out with friends.  She started school again but is really just going through the motions, does not really care about it at this point.  As far as any improvement, she does think that she has been less irritable and crabby.  She feels like she is crying less.  She feels like her  is a good support system.  We talked about therapy but she did not think she could work it into her work and school schedule.  She will just double check with her insurance to see where she can go we might look into that.  She still having quite a bit of anhedonia and anxiety.  She denies suicidal thoughts.  We discussed options and she would like to try going up on the fluoxetine.    Relevant past medical, family, surgical, and social history reviewed with patient, unless noted in HPI, not pertinent for this visit.    Review of Systems   A 12 point comprehensive review of systems was  "negative except as noted.      Current Outpatient Prescriptions   Medication Sig Dispense Refill     cetirizine (ZYRTEC) 10 MG tablet Take 10 mg by mouth.       CRANBERRY FRUIT EXTRACT (CRANBERRY CONCENTRATE ORAL) Take by mouth as needed.        levonorgestrel-ethinyl estradiol (SEASONALE) 0.15 mg-30 mcg per tablet Take 1 tablet by mouth daily. 1 Package 3     multivitamin therapeutic tablet Take 1 tablet by mouth daily.       PUMPKIN SEED EXTRACT/SOY GERM (AZO BLADDER CONTROL ORAL) Take by mouth as needed.        FLUoxetine (PROZAC) 20 MG tablet Take 1 tablet (20 mg total) by mouth daily. 30 tablet 1     No current facility-administered medications for this visit.        Objective:      /70  Pulse 84  Temp 98.7  F (37.1  C) (Oral)   Resp 16  Ht 5' 8.75\" (1.746 m)  Wt 186 lb (84.4 kg)  BMI 27.67 kg/m2    General appearance: alert, appears stated age and cooperative  Good eye contact and social smile, tearful at times..  Lungs: clear to auscultation bilaterally  Heart: regular rate and rhythm, S1, S2 normal, no murmur, click, rub or gallop      No results found for this or any previous visit (from the past 168 hour(s)).       This note has been dictated using voice recognition software. Any grammatical or context distortions are unintentional and inherent to the software  "

## 2021-06-21 NOTE — LETTER
Letter by Jennifer Bell MD at      Author: Jennifer Bell MD Service: -- Author Type: --    Filed:  Encounter Date: 3/28/2021 Status: (Other)         Abimbola Monreal  [address]  [place] [address]     March 28, 2021     Dear Ms. Monreal,    Below are the results from your recent visit:    Resulted Orders   Metanephrines Fractionated by HPLC-MS/MS, Urine   Result Value Ref Range    Hours Collected 24 hr    Metanephrine, Urine - per volume 47 ug/L    Normetanephrine, Urine - per volume 103 ug/L    Metanephrine, Urine - per 24h 85 36 - 229 ug/d    Metanephrine, Urine - ratio to CRT 80 0 - 300 ug/g CRT    Normetanephrine, Urine - per 24h 185 95 - 650 ug/d    Normetanephrine, Urine - ratio to  0 - 400 ug/g CRT     The test results show that your urine collection shows no increased gentleman, so this is not the cause for the fast heartbeat, this is good news, no tumor, which is rare in any event.  As you remember, I am the cardiologist who saw you here while in the hospital.    Please call with questions or contact us using [employer].    Sincerely,        Electronically signed by Jennifer Bell MD

## 2021-08-10 ENCOUNTER — VIRTUAL VISIT (OUTPATIENT)
Dept: FAMILY MEDICINE | Facility: CLINIC | Age: 31
End: 2021-08-10
Payer: COMMERCIAL

## 2021-08-10 DIAGNOSIS — F10.11 HISTORY OF ALCOHOL ABUSE: ICD-10-CM

## 2021-08-10 DIAGNOSIS — F33.2 MAJOR DEPRESSIVE DISORDER, RECURRENT SEVERE WITHOUT PSYCHOTIC FEATURES (H): Primary | ICD-10-CM

## 2021-08-10 DIAGNOSIS — F41.9 ANXIETY: ICD-10-CM

## 2021-08-10 PROBLEM — F10.230 ALCOHOL DEPENDENCE WITH UNCOMPLICATED WITHDRAWAL (H): Status: ACTIVE | Noted: 2021-08-10

## 2021-08-10 PROCEDURE — 99214 OFFICE O/P EST MOD 30 MIN: CPT | Mod: TEL | Performed by: FAMILY MEDICINE

## 2021-08-10 RX ORDER — MULTIPLE VITAMINS W/ MINERALS TAB 9MG-400MCG
1 TAB ORAL
COMMUNITY
End: 2022-02-22

## 2021-08-10 RX ORDER — HYDROXYZINE HYDROCHLORIDE 25 MG/1
25-50 TABLET, FILM COATED ORAL 2 TIMES DAILY PRN
Qty: 60 TABLET | Refills: 1 | Status: SHIPPED | OUTPATIENT
Start: 2021-08-10 | End: 2022-01-25

## 2021-08-10 ASSESSMENT — ANXIETY QUESTIONNAIRES
GAD7 TOTAL SCORE: 10
1. FEELING NERVOUS, ANXIOUS, OR ON EDGE: SEVERAL DAYS
3. WORRYING TOO MUCH ABOUT DIFFERENT THINGS: MORE THAN HALF THE DAYS
IF YOU CHECKED OFF ANY PROBLEMS ON THIS QUESTIONNAIRE, HOW DIFFICULT HAVE THESE PROBLEMS MADE IT FOR YOU TO DO YOUR WORK, TAKE CARE OF THINGS AT HOME, OR GET ALONG WITH OTHER PEOPLE: VERY DIFFICULT
5. BEING SO RESTLESS THAT IT IS HARD TO SIT STILL: SEVERAL DAYS
6. BECOMING EASILY ANNOYED OR IRRITABLE: NOT AT ALL
4. TROUBLE RELAXING: MORE THAN HALF THE DAYS
2. NOT BEING ABLE TO STOP OR CONTROL WORRYING: MORE THAN HALF THE DAYS
7. FEELING AFRAID AS IF SOMETHING AWFUL MIGHT HAPPEN: MORE THAN HALF THE DAYS

## 2021-08-10 ASSESSMENT — PATIENT HEALTH QUESTIONNAIRE - PHQ9: SUM OF ALL RESPONSES TO PHQ QUESTIONS 1-9: 13

## 2021-08-10 NOTE — PATIENT INSTRUCTIONS
Mental Health referral recommendations:    [facility]:   [facility] [phone]  [address], [place] [address]     [place]:  [place] [facility]: [phone]  [address], [facility] [address]    [facility]: [phone]  [address], [place] [address]    [facility]:  [phone]   [address] [place] [address]    [facility]:  [facility] [phone]   [address]    [place]:  [facility]: [phone]  [address]     [place]:  [employer] [facility]  [phone]  [address], [place] [address]    [place]:  [place] [facility] [phone]    [address], [facility]  [address]    [facility] /[phone]   [address], [address]    [facility] [phone] [address], [address]    [facility] [phone]   [facility]    Below are resources in case you experience an increase in symptoms or feel you are in crisis:     Acute Emergency / Crisis    If you are having an acute psychiatric emergency, please call 911 or have someone drive you directly to a hospital for further evaluation.    Mental Health Crisis Lines    [facility]:     Adult Crisis Line ([phone])    Children's Crisis Line (838-461-4916)  [facility]:  Crisis Connection  ([phone])      Urgent Care     [address] [place] [address]   ([phone])     Provides mental health crisis assessments    Walk-in appointments are available     Additional resources  -Local 24 hour Crisis Line: 1-640.875.4506   -National Suicide Prevention Life Line 8-774-875-TALK (6851), www.suicidepreventionlifeline.org  - National [place] of Mental Illness (www.[place].william.org) 555.166.2414 or 1-664.836.5081  - Suicide Awareness Voices of Education (SAVE) (www.save.org) 4-061-135-SAVE (9783)   - Substance Abuse and  Mental Health Services Administration ([facility]) www.samhsa.gov 24 hour helpline: 9-519-609 HELP (9276)  - Narcotics Anonymous (www.[place].org) 24 hours [facility] Helpline 1-239.556.8762  - [facility] Alcoholic Anonymous [facility] 24 hour phone line 708-496-0107  - Alcoholics Anonymous (www.alcoholics-anonymous.org)  - [place] Recovery Connection ([facility]). [facility] connects people seeking recovery to resources that help foster and sustain long-term recovery. Whether you are seeking resources for treatment, transpiortation, housing, job training, education, health or other pathways to recovery, [facility] is a great place to start. 346.167.1615 www.[place] recovery.org    Health Tips:  - Create a daily schedule  - Eat Healthy  - Do at least one enjoyable activity each day  - Take medications as prescribed  - Get regular sleep at least 8 hours per night  - Practice relaxation  - Spend time with supportive people

## 2021-08-10 NOTE — PROGRESS NOTES
Maci is a 31 year old who is being evaluated via a billable telephone visit.      What phone number would you like to be contacted at? [phone]  How would you like to obtain your AVS? Go    Assessment & Plan     Major depressive disorder, recurrent severe without psychotic features (H)  Anxiety  History of anxiety, depression and alcohol abuse with prior arrest requiring inpatient treatment in 2019.  She had self weaned off her medications last year and had been doing well until recently.  She is going through cycles of depression and alcohol relapse.  Patient interested in psychotherapy so this referral was placed today.  She is hesitant regarding restarting pharmacotherapy and given her history of reported hypomania, I do have some reservations regarding restarting her fluoxetine.  Will discuss with patient's PCP to see if she has additional input based on her psychiatric history.  - MENTAL HEALTH REFERRAL  - Adult; Outpatient Treatment; Individual/Couples/Family/Group Therapy/Health Psychology; [facility] [phone]; We will contact you to schedule the appointment or please call with any questions; Future  - hydrOXYzine (ATARAX) 25 MG tablet; Take 1-2 tablets (25-50 mg) by mouth 2 times daily as needed for anxiety    [facility]    Subjective   Maci is a 31 year old who presents for the following health issues  Chief Complaint   Patient presents with     Anxiety     anxiety and depression has gotten worse     HPI     History of anxiety and depression. Last year made major life changes (left her ex-, bought a new house, pursuing nursing school) and weaned off all of her medications (fluoxetine, wellbutrin, and lamictal). Had been doing well until a few months ago, where she began feeling a general decline again. Catches herself in these spirals. Every other week she's unsure if she'll have a good week.  Her boyfriend is currently  deployed which has been difficult but she states her mood began shifting prior to his departure.  She does have a history of alcohol abuse and recently began drinking at home alone again.  Her best friend and brother are aware and have been keeping her accountable and so she has since been avoiding alcohol.  Her family has a strong history of abuse.    She reports symptoms of hyperactivity and then depression so started on Lamictal.  There was a time when she had extreme highs and lows and was admitted to [facility]. she believes she tolerated Lamictal and fluoxetine fine.  She just did not want to stay on medications.  She has since been doing cognitive behavioral workbooks and participated in a few psychotherapy sessions through the employee assistance program.  She would be interested in establishing with a long-term psychotherapist.      Objective           Vitals:  No vitals were obtained today due to virtual visit.    Physical Exam   healthy, alert and no distress  PSYCH: Alert and oriented times 3; coherent speech, normal   rate and volume, able to articulate logical thoughts, able   to abstract reason, no tangential thoughts, no hallucinations   or delusions  Her affect is pleasant  RESP: No cough, no audible wheezing, able to talk in full sentences  Remainder of exam unable to be completed due to telephone visits    Phone call duration: 21 minutes

## 2021-08-11 PROBLEM — F10.230 ALCOHOL DEPENDENCE WITH UNCOMPLICATED WITHDRAWAL (H): Status: RESOLVED | Noted: 2021-08-10 | Resolved: 2021-08-11

## 2021-08-11 PROBLEM — F10.11 HISTORY OF ALCOHOL ABUSE: Status: ACTIVE | Noted: 2021-08-11

## 2021-08-11 ASSESSMENT — ANXIETY QUESTIONNAIRES: GAD7 TOTAL SCORE: 10

## 2021-09-12 ENCOUNTER — HEALTH MAINTENANCE LETTER (OUTPATIENT)
Age: 31
End: 2021-09-12

## 2021-10-07 ENCOUNTER — PATIENT OUTREACH (OUTPATIENT)
Dept: CARE COORDINATION | Facility: CLINIC | Age: 31
End: 2021-10-07

## 2021-10-07 DIAGNOSIS — F10.11 HISTORY OF ALCOHOL ABUSE: ICD-10-CM

## 2021-10-07 DIAGNOSIS — F33.2 MAJOR DEPRESSIVE DISORDER, RECURRENT SEVERE WITHOUT PSYCHOTIC FEATURES (H): Primary | ICD-10-CM

## 2021-10-07 NOTE — PROGRESS NOTES
Clinic Care Coordination Contact  [facility]/Voicemail    Clinical Data: Care Coordinator Outreach  Outreach attempted x 1.  Left message on patient's voicemail with call back information and requested return call.  Plan: Care Coordinator will try to reach patient again in 1-2 business days.

## 2021-10-07 NOTE — PROGRESS NOTES
Clinical Product Navigator RN reviewed chart; patient on payer product coverage.  Review results: Met referral criteria for Care Coordinator; referral to be sent.    Ailyn Correa RN/Clinical Product Navigator

## 2021-10-08 ENCOUNTER — PATIENT OUTREACH (OUTPATIENT)
Dept: NURSING | Facility: CLINIC | Age: 31
End: 2021-10-08

## 2021-10-08 NOTE — LETTER
[facility] CARE COORDINATION  [facility] [address]  [facility] [address]    October 8, 2021    Abimbola Monreal  [address] [place] [address]      Dear Abimbola,    I am a clinic community health worker who works with Mary Dias at [facility]. I wanted to thank you for spending the time to talk with me.  Below is a description of clinic care coordination and how I can further assist you.      The clinic care coordination team is made up of a registered nurse,  and community health worker who understand the health care system. The goal of clinic care coordination is to help you manage your health and improve access to the health care system in the most efficient manner. The team can assist you in meeting your health care goals by providing education, coordinating services, strengthening the communication among your providers and supporting you with any resource needs.    Please feel free to contact me at [phone] with any questions or concerns. We are focused on providing you with the highest-quality healthcare experience possible and that all starts with you.     Sincerely,     Alejandrina Wyatt  Community Health Worker  [facility] Care Coordination   Office: [phone]

## 2021-10-08 NOTE — PROGRESS NOTES
Clinic Care Coordination Contact  Community Health Worker Initial Outreach    Patient accepts CC: No, no needs. Patient let CHW knopw she has a counselor and PCP and has a good team and is not in need of anyother supports at this time. Patient will be sent Care Coordination introduction letter for future reference.

## 2021-11-16 ENCOUNTER — E-VISIT (OUTPATIENT)
Dept: FAMILY MEDICINE | Facility: CLINIC | Age: 31
End: 2021-11-16
Payer: COMMERCIAL

## 2021-11-16 DIAGNOSIS — F33.2 MAJOR DEPRESSIVE DISORDER, RECURRENT SEVERE WITHOUT PSYCHOTIC FEATURES (H): Primary | ICD-10-CM

## 2021-11-16 DIAGNOSIS — F10.11 HISTORY OF ALCOHOL ABUSE: ICD-10-CM

## 2021-11-16 PROCEDURE — 99421 OL DIG E/M SVC 5-10 MIN: CPT | Performed by: FAMILY MEDICINE

## 2021-11-16 RX ORDER — LAMOTRIGINE 25 MG/1
25 TABLET ORAL DAILY
Qty: 30 TABLET | Refills: 1 | Status: SHIPPED | OUTPATIENT
Start: 2021-11-16 | End: 2022-01-25

## 2021-11-16 RX ORDER — NALTREXONE HYDROCHLORIDE 50 MG/1
50 TABLET, FILM COATED ORAL DAILY
Qty: 30 TABLET | Refills: 1 | Status: SHIPPED | OUTPATIENT
Start: 2021-11-16 | End: 2022-01-25

## 2021-11-16 NOTE — PATIENT INSTRUCTIONS
Thank you for choosing us for your care. I have placed an order for a prescription so that you can start treatment. View your full visit summary for details by clicking on the link below. Your pharmacist will able to address any questions you may have about the medication.     If you're not feeling better within 5-7 days, please schedule an appointment.  You can schedule an appointment right here in [facility], or call [phone]  If the visit is for the same symptoms as your eVisit, we'll refund the cost of your eVisit if seen within seven days.

## 2022-01-11 ENCOUNTER — E-VISIT (OUTPATIENT)
Dept: FAMILY MEDICINE | Facility: CLINIC | Age: 32
End: 2022-01-11
Payer: COMMERCIAL

## 2022-01-11 DIAGNOSIS — Z87.898 HISTORY OF ALCOHOL USE DISORDER: Primary | ICD-10-CM

## 2022-01-11 DIAGNOSIS — F10.11 HISTORY OF ALCOHOL ABUSE: Primary | ICD-10-CM

## 2022-01-11 PROCEDURE — 99207 PR NON-BILLABLE SERV PER CHARTING: CPT | Performed by: FAMILY MEDICINE

## 2022-01-11 NOTE — PATIENT INSTRUCTIONS
I can put in the referral, but would recommend that you  Make an appointment to discuss your ARNULFO and any needed paperwork that might go with that.    Thank you for choosing us for your care. I think an in-clinic visit would be best next steps based on your symptoms. Please schedule a clinic appointment; you won t be charged for this eVisit.      You can schedule an appointment right here in [facility], or call [phone]

## 2022-01-13 ENCOUNTER — TELEPHONE (OUTPATIENT)
Dept: BEHAVIORAL HEALTH | Facility: CLINIC | Age: 32
End: 2022-01-13
Payer: COMMERCIAL

## 2022-01-17 ENCOUNTER — HOSPITAL ENCOUNTER (OUTPATIENT)
Dept: BEHAVIORAL HEALTH | Facility: CLINIC | Age: 32
Discharge: HOME | End: 2022-01-17
Attending: FAMILY MEDICINE | Admitting: FAMILY MEDICINE
Payer: COMMERCIAL

## 2022-01-17 VITALS — WEIGHT: 190 LBS | HEIGHT: 69 IN | BODY MASS INDEX: 28.14 KG/M2

## 2022-01-17 DIAGNOSIS — Z87.898 HISTORY OF ALCOHOL USE DISORDER: ICD-10-CM

## 2022-01-17 PROBLEM — F10.20 ALCOHOL USE DISORDER, SEVERE, DEPENDENCE (H): Status: ACTIVE | Noted: 2021-08-10

## 2022-01-17 PROCEDURE — H0001 ALCOHOL AND/OR DRUG ASSESS: HCPCS | Mod: GT,95

## 2022-01-17 ASSESSMENT — ANXIETY QUESTIONNAIRES
GAD7 TOTAL SCORE: 10
5. BEING SO RESTLESS THAT IT IS HARD TO SIT STILL: SEVERAL DAYS
7. FEELING AFRAID AS IF SOMETHING AWFUL MIGHT HAPPEN: SEVERAL DAYS
GAD7 TOTAL SCORE: 10
2. NOT BEING ABLE TO STOP OR CONTROL WORRYING: MORE THAN HALF THE DAYS
GAD7 TOTAL SCORE: 10
7. FEELING AFRAID AS IF SOMETHING AWFUL MIGHT HAPPEN: SEVERAL DAYS
IF YOU CHECKED OFF ANY PROBLEMS ON THIS QUESTIONNAIRE, HOW DIFFICULT HAVE THESE PROBLEMS MADE IT FOR YOU TO DO YOUR WORK, TAKE CARE OF THINGS AT HOME, OR GET ALONG WITH OTHER PEOPLE: VERY DIFFICULT
1. FEELING NERVOUS, ANXIOUS, OR ON EDGE: MORE THAN HALF THE DAYS
4. TROUBLE RELAXING: MORE THAN HALF THE DAYS
6. BECOMING EASILY ANNOYED OR IRRITABLE: NOT AT ALL
3. WORRYING TOO MUCH ABOUT DIFFERENT THINGS: MORE THAN HALF THE DAYS

## 2022-01-17 ASSESSMENT — PATIENT HEALTH QUESTIONNAIRE - PHQ9
10. IF YOU CHECKED OFF ANY PROBLEMS, HOW DIFFICULT HAVE THESE PROBLEMS MADE IT FOR YOU TO DO YOUR WORK, TAKE CARE OF THINGS AT HOME, OR GET ALONG WITH OTHER PEOPLE: VERY DIFFICULT
SUM OF ALL RESPONSES TO PHQ QUESTIONS 1-9: 12
SUM OF ALL RESPONSES TO PHQ QUESTIONS 1-9: 12

## 2022-01-17 ASSESSMENT — PAIN SCALES - GENERAL: PAINLEVEL: NO PAIN (0)

## 2022-01-17 ASSESSMENT — MIFFLIN-ST. JEOR: SCORE: 1641.21

## 2022-01-17 NOTE — ADDENDUM NOTE
Encounter addended by: Pamela Acevedo [facility] on: 1/17/2022 3:16 PM   Actions taken: Clinical Note Signed
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
## 2022-01-18 ASSESSMENT — PATIENT HEALTH QUESTIONNAIRE - PHQ9: SUM OF ALL RESPONSES TO PHQ QUESTIONS 1-9: 12

## 2022-01-18 ASSESSMENT — ANXIETY QUESTIONNAIRES: GAD7 TOTAL SCORE: 10

## 2022-01-25 ENCOUNTER — OFFICE VISIT (OUTPATIENT)
Dept: FAMILY MEDICINE | Facility: CLINIC | Age: 32
End: 2022-01-25
Payer: COMMERCIAL

## 2022-01-25 VITALS
DIASTOLIC BLOOD PRESSURE: 103 MMHG | RESPIRATION RATE: 16 BRPM | HEIGHT: 69 IN | WEIGHT: 197.8 LBS | BODY MASS INDEX: 29.3 KG/M2 | SYSTOLIC BLOOD PRESSURE: 163 MMHG | HEART RATE: 127 BPM

## 2022-01-25 DIAGNOSIS — R03.0 ELEVATED BP WITHOUT DIAGNOSIS OF HYPERTENSION: ICD-10-CM

## 2022-01-25 DIAGNOSIS — F41.1 GENERALIZED ANXIETY DISORDER: ICD-10-CM

## 2022-01-25 DIAGNOSIS — F33.2 MAJOR DEPRESSIVE DISORDER, RECURRENT SEVERE WITHOUT PSYCHOTIC FEATURES (H): ICD-10-CM

## 2022-01-25 DIAGNOSIS — F10.20 ALCOHOL USE DISORDER, SEVERE, DEPENDENCE (H): Primary | ICD-10-CM

## 2022-01-25 PROCEDURE — 99214 OFFICE O/P EST MOD 30 MIN: CPT | Performed by: FAMILY MEDICINE

## 2022-01-25 RX ORDER — ONDANSETRON 4 MG/1
4 TABLET, FILM COATED ORAL EVERY 8 HOURS PRN
Qty: 30 TABLET | Refills: 1 | Status: SHIPPED | OUTPATIENT
Start: 2022-01-25 | End: 2022-02-22

## 2022-01-25 RX ORDER — NALTREXONE HYDROCHLORIDE 50 MG/1
50 TABLET, FILM COATED ORAL DAILY
Qty: 30 TABLET | Refills: 3 | Status: SHIPPED | OUTPATIENT
Start: 2022-01-25 | End: 2022-02-22

## 2022-01-25 ASSESSMENT — MIFFLIN-ST. JEOR: SCORE: 1676.59

## 2022-01-26 ENCOUNTER — MYC MEDICAL ADVICE (OUTPATIENT)
Dept: FAMILY MEDICINE | Facility: CLINIC | Age: 32
End: 2022-01-26
Payer: COMMERCIAL

## 2022-01-26 NOTE — PROGRESS NOTES
Assessment/ Plan     1. Alcohol use disorder, severe, dependence (H)  Maci has had a relapse on her alcohol use disorder since September.  She just recently got started with therapy this morning.  She would like to take a 6-week leave of absence from work to work on things.  She would like to restart the naltrexone as she found that helpful in the past.  She still having some nausea so would like some Zofran.  Its been 2 days since she last drank so I think she is over the worst of her withdrawals but I suspect this is why her blood pressure so high.  We will continue to monitor this going forward.  - naltrexone (DEPADE/REVIA) 50 MG tablet; Take 1 tablet (50 mg) by mouth daily  Dispense: 30 tablet; Refill: 3  - ondansetron (ZOFRAN) 4 MG tablet; Take 1 tablet (4 mg) by mouth every 8 hours as needed for nausea  Dispense: 30 tablet; Refill: 1    2. 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  She will start the 20 mg dose and then follow-up with me in 4 weeks.  At that time can increase as needed.  - FLUoxetine (PROZAC) 20 MG capsule; Take 1 capsule (20 mg) by mouth daily  Dispense: 30 capsule; Refill: 3    3. Generalized anxiety disorder  As above  - FLUoxetine (PROZAC) 20 MG capsule; Take 1 capsule (20 mg) by mouth daily  Dispense: 30 capsule; Refill: 3    4. Elevated BP without diagnosis of hypertension  I suspect this is related to her drinking but will recheck on follow-up.      Subjective:      Abimbola Monreal is a 31 year old female who presents for follow-up of a call use disorder, depression, and anxiety.  Sai had gone through treatment several years ago for alcohol use disorder.  She had been doing quite well for some time but had a setback.  Her biological father  and this brought back a lot of painful memories for her.  She started drinking again in September.  She feels like it has gotten out of control  "again.  She does want to take steps to get back on track.  She just met with the therapist this morning.  She is going to try to do this outpatient, but it sounds like they have a plan if she needs a day program or inpatient treatment.  She is currently not taking any medication for her depression or anxiety.  She had been on Prozac, Lamictal, and Wellbutrin in the past.  She feels like she is having more anxiety than depression at this point.  She also is wanting to take a leave of absence.  She was last at work on January 3.  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  She does feel like she has a fairly good support system and family and friends.    Relevant past medical, family, surgical, and social history reviewed with patient, unless noted in HPI, not pertinent for this visit.  Medications were discussed and reconciled.   Review of Systems   A 12 point comprehensive review of systems was negative except as noted.      Current Outpatient Medications   Medication Sig Dispense Refill     cetirizine (ZYRTEC) 10 MG tablet Take 10 mg by mouth as needed for allergies       FLUoxetine (PROZAC) 20 MG capsule Take 1 capsule (20 mg) by mouth daily 30 capsule 3     Lactobacillus Rhamnosus, GG, (RA PROBIOTIC DIGESTIVE CARE) CAPS Take 1 capsule by mouth       multivitamin w/minerals (MULTIVITAMIN, THERAPEUTIC WITH MINERALS) tablet Take 1 tablet by mouth       naltrexone (DEPADE/REVIA) 50 MG tablet Take 1 tablet (50 mg) by mouth daily 30 tablet 3     ondansetron (ZOFRAN) 4 MG tablet Take 1 tablet (4 mg) by mouth every 8 hours as needed for nausea 30 tablet 1         Objective:     BP (!) 163/103   Pulse (!) 127   Resp 16   Ht 1.753 m (5' 9\")   Wt 89.7 kg (197 lb 12.8 oz)   LMP  (LMP Unknown)   BMI 29.21 kg/m      Body mass index is 29.21 kg/m .       General appearance: alert, appears stated age and cooperative  Good eye contact, tearful, somewhat shaky  Lungs: clear to " auscultation bilaterally  Heart: regular rate and rhythm, mildly tachycardic    No results found for this or any previous visit (from the past 168 hour(s)).       This note has been dictated using voice recognition software. Any grammatical or context distortions are unintentional and inherent to the software

## 2022-02-22 ENCOUNTER — VIRTUAL VISIT (OUTPATIENT)
Dept: FAMILY MEDICINE | Facility: CLINIC | Age: 32
End: 2022-02-22
Payer: COMMERCIAL

## 2022-02-22 DIAGNOSIS — F10.20 ALCOHOL USE DISORDER, SEVERE, DEPENDENCE (H): Primary | ICD-10-CM

## 2022-02-22 DIAGNOSIS — F33.2 MAJOR DEPRESSIVE DISORDER, RECURRENT SEVERE WITHOUT PSYCHOTIC FEATURES (H): ICD-10-CM

## 2022-02-22 PROCEDURE — 99213 OFFICE O/P EST LOW 20 MIN: CPT | Mod: 95 | Performed by: FAMILY MEDICINE

## 2022-02-22 ASSESSMENT — ANXIETY QUESTIONNAIRES
7. FEELING AFRAID AS IF SOMETHING AWFUL MIGHT HAPPEN: NOT AT ALL
5. BEING SO RESTLESS THAT IT IS HARD TO SIT STILL: NOT AT ALL
GAD7 TOTAL SCORE: 2
6. BECOMING EASILY ANNOYED OR IRRITABLE: NOT AT ALL
1. FEELING NERVOUS, ANXIOUS, OR ON EDGE: SEVERAL DAYS
3. WORRYING TOO MUCH ABOUT DIFFERENT THINGS: SEVERAL DAYS
2. NOT BEING ABLE TO STOP OR CONTROL WORRYING: NOT AT ALL
IF YOU CHECKED OFF ANY PROBLEMS ON THIS QUESTIONNAIRE, HOW DIFFICULT HAVE THESE PROBLEMS MADE IT FOR YOU TO DO YOUR WORK, TAKE CARE OF THINGS AT HOME, OR GET ALONG WITH OTHER PEOPLE: NOT DIFFICULT AT ALL

## 2022-02-22 ASSESSMENT — PATIENT HEALTH QUESTIONNAIRE - PHQ9
SUM OF ALL RESPONSES TO PHQ QUESTIONS 1-9: 1
5. POOR APPETITE OR OVEREATING: NOT AT ALL

## 2022-02-22 NOTE — PROGRESS NOTES
Maci is a 31 year old who is being evaluated via a billable video visit.      How would you like to obtain your AVS? MyChart  If the video visit is dropped, the invitation should be resent by: Send to e-mail at: [email or web address]  Will anyone else be joining your video visit? No      Video Start Time: 3:40    1. Alcohol use disorder, severe, dependence (H)  Patient is following up on her recent relapse.  She is now been sober since I last talked to her.  She is seeing a therapist once weekly.  She feels like she wants to go back to work this Friday which would be 25 February.  She is no longer taking any of her medications.    2. Major depressive disorder, recurrent severe without psychotic features (H)  She feels that her depression and anxiety are under good control with just doing therapy.  She is no longer taking the fluoxetine or the naltrexone.  She will continue to be in close contact with her therapist and let me know if she is having any worsening symptoms.    Subjective   Maci is a 31 year old who presents for the following health issues  HPI   Maci presents virtually to follow-up on alcohol use disorder with a relapse.  When I saw her last, she was really struggling.  She had just gotten plugged in with a therapist.  We had her restart fluoxetine and naltrexone.  She states she really only took this for couple of days and then was having a hard time sleeping so she stopped taking them.  She has been seeing a therapist regularly and feels like things are under good control.  She has been sober since I last talked to her.  She is been on a leave of absence but wants to return to work this Friday.  Objective           Vitals:  No vitals were obtained today due to virtual visit.    Physical Exam   GENERAL: Healthy, alert and no distress  EYES: Eyes grossly normal to inspection.  No discharge or erythema, or obvious scleral/conjunctival abnormalities.  RESP: No audible wheeze, cough, or visible cyanosis.   No visible retractions or increased work of breathing.    SKIN: Visible skin clear. No significant rash, abnormal pigmentation or lesions.  NEURO: Cranial nerves grossly intact.  Mentation and speech appropriate for age.  PSYCH: Mentation appears normal, affect normal/bright, judgement and insight intact, normal speech and appearance well-groomed.          Video-Visit Details    Type of service:  Video Visit    Video End Time:3:56 PM    Originating Location (pt. Location): Home    Distant Location (provider location):  [facility]     Platform used for Video Visit: [employer]

## 2022-02-23 ASSESSMENT — ANXIETY QUESTIONNAIRES: GAD7 TOTAL SCORE: 2

## 2022-03-30 ENCOUNTER — OFFICE VISIT (OUTPATIENT)
Dept: FAMILY MEDICINE | Facility: CLINIC | Age: 32
End: 2022-03-30
Payer: COMMERCIAL

## 2022-03-30 VITALS
SYSTOLIC BLOOD PRESSURE: 122 MMHG | TEMPERATURE: 98.3 F | WEIGHT: 205 LBS | HEART RATE: 97 BPM | BODY MASS INDEX: 30.27 KG/M2 | RESPIRATION RATE: 16 BRPM | DIASTOLIC BLOOD PRESSURE: 83 MMHG

## 2022-03-30 DIAGNOSIS — N92.6 MISSED MENSES: Primary | ICD-10-CM

## 2022-03-30 DIAGNOSIS — Z20.1 EXPOSURE TO TB: ICD-10-CM

## 2022-03-30 LAB — HCG UR QL: POSITIVE

## 2022-03-30 PROCEDURE — 99000 SPECIMEN HANDLING OFFICE-LAB: CPT | Performed by: FAMILY MEDICINE

## 2022-03-30 PROCEDURE — 86747 PARVOVIRUS ANTIBODY: CPT | Mod: 90 | Performed by: FAMILY MEDICINE

## 2022-03-30 PROCEDURE — 36415 COLL VENOUS BLD VENIPUNCTURE: CPT | Performed by: FAMILY MEDICINE

## 2022-03-30 PROCEDURE — 81025 URINE PREGNANCY TEST: CPT | Performed by: FAMILY MEDICINE

## 2022-03-30 PROCEDURE — 86481 TB AG RESPONSE T-CELL SUSP: CPT | Performed by: FAMILY MEDICINE

## 2022-03-30 PROCEDURE — 99214 OFFICE O/P EST MOD 30 MIN: CPT | Performed by: FAMILY MEDICINE

## 2022-03-30 RX ORDER — CHLORAL HYDRATE 500 MG
2 CAPSULE ORAL DAILY
COMMUNITY

## 2022-03-30 RX ORDER — PRENATAL VIT/IRON FUM/FOLIC AC 27MG-0.8MG
1 TABLET ORAL DAILY
COMMUNITY

## 2022-03-30 RX ORDER — LACTOBACILLUS RHAMNOSUS GG 10B CELL
1 CAPSULE ORAL 2 TIMES DAILY
COMMUNITY
End: 2022-03-30

## 2022-03-30 NOTE — PATIENT INSTRUCTIONS
I ordered an ultrasound for dating.  This can be done at [facility].  You can call [phone] to set that up.  Please set that up for next week to ensure you are at least 6 weeks pregnant to see a heartbeat.    Please ensure your prenatal vitamin has folic acid, DHA and iron.    Recommend 3 dairy servings a day or calcium with vitamin D twice a day with food.    I will have you stop your probiotic now.  If we need to add it in after the first trimester or after 12 weeks pregnant likely okay but a little unknown safety in the first trimester.    If your prenatal vitamin has the DHA or the fish oil that 107 daily recommended that you do not need the extra fish oil.     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  Good idea to glove for all patients.  If possible avoiding the code green patients.      I do recommend the COVID booster and lets give that next visit when you are 12 weeks pregnant.    Caffeine is safe at 50 mg a day or less.  That is one soda or one small coffee.    Regular tea is fine may be avoiding herbal teas for now.    Vegetarian diet to very healthy but make sure you are getting in protein daily.  1 fish serving a week is safe.  No sushi shark or swordfish.  No fresh water fish over 20 inches.    We want 2300 elton a day for pregnancy.  We expect about 25 pounds of weight gain in pregnancy.    Per father of baby just check that you have had a tetanus with whooping cough or Tdap shot within the last 10 years.    There is a genetic test called a cell free DNA test that can be drawn at your 12-week appointment if you wish.  Typically it is covered by insurance if there is a family history of a genetic abnormality but the reps tell is is a max out-of-pocket of $200.  It is an optional test.  It checks for trisomy 21, 13, 18 and optional check of sex chromosomes.    If you want genetic testing but she was not to do the first 1 we have a  quad screen that can be done at 16 weeks.  It checks for trisomy 13, 21, 18 and spina bifida.    If you are only wishing the genetic test for spina bifida that would be the alpha-fetoprotein at the 16-week appointment.    Today checking the quanteferon gold test for tuberculosis and the Parvovirus B19 antibodies.

## 2022-03-30 NOTE — PROGRESS NOTES
Answers for HPI/ROS submitted by the patient on 3/29/2022  How many servings of fruits and vegetables do you eat daily?: 4 or more  On average, how many sweetened beverages do you drink each day (Examples: soda, juice, sweet tea, etc.  Do NOT count diet or artificially sweetened beverages)?: 0  What is the reason for your visit today?: Pregnancy confirmation    Assessment:   Pregnancy confirmation    Plan:    Counseled patient on early pregnancy issues and plans for continued pregnancy:  - OTC meds safe in early pregnancy,  - importance of prenatal vitamins, and routine activities without restrictions.    - importance of ETOH abstinence and no other drug use  Informed of signs and symptoms of miscarriage and to call with questions of spotting/bleeding management and possible need to come in for evaluation before routine visit at 10-12 weeks.   Set 40 minute visit at 12 weeks pregnant.  Discussed information in the OB packet.  Will let us know if early genetic testing wanted.  Can call for script of Phenergan or Reglan if needed for nausea.  Early ultrasound ordered.    30 minutes spent on the day of encounter doing chart review, history and exam, documentation, and further activities as noted.     I ordered an ultrasound for dating.  This can be done at [facility].  You can call [phone] to set that up.  Please set that up for next week to ensure you are at least 6 weeks pregnant to see a heartbeat.    Please ensure your prenatal vitamin has folic acid, DHA and iron.    Recommend 3 dairy servings a day or calcium with vitamin D twice a day with food.    I will have you stop your probiotic now.  If we need to add it in after the first trimester or after 12 weeks pregnant likely okay but a little unknown safety in the first trimester.    If your prenatal vitamin has the DHA or the fish oil that 107 daily recommended that you do not need the extra fish oil.     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  Good idea to glove for all patients.  If possible avoiding the code green patients.      I do recommend the COVID booster and lets give that next visit when you are 12 weeks pregnant.    Caffeine is safe at 50 mg a day or less.  That is one soda or one small coffee.    Regular tea is fine may be avoiding herbal teas for now.    Vegetarian diet to very healthy but make sure you are getting in protein daily.  1 fish serving a week is safe.  No sushi shark or swordfish.  No fresh water fish over 20 inches.    We want 2300 elton a day for pregnancy.  We expect about 25 pounds of weight gain in pregnancy.    Per father of baby just check that you have had a tetanus with whooping cough or Tdap shot within the last 10 years.    There is a genetic test called a cell free DNA test that can be drawn at your 12-week appointment if you wish.  Typically it is covered by insurance if there is a family history of a genetic abnormality but the reps tell is is a max out-of-pocket of $200.  It is an optional test.  It checks for trisomy 21, 13, 18 and optional check of sex chromosomes.    If you want genetic testing but she was not to do the first 1 we have a quad screen that can be done at 16 weeks.  It checks for trisomy 13, 21, 18 and spina bifida.    If you are only wishing the genetic test for spina bifida that would be the alpha-fetoprotein at the 16-week appointment.    Today checking the quanteferon gold test for tuberculosis and the Parvovirus B19 antibodies.    After she left I had noticed alcohol dependence and depression on her problem list.  She did tell me she was not drinking but I will assess this in more detail at her next visit.    chief complaint     HPI: 32 year old year old female come in  today for a pregnancy confirmation. LMP was mid Feb?. This would make her about 6 weeks weeks pregnant with EDC in Nov. Not having any spotting. She is having nausea  very intermittently without vomiting.  She works as an [employer] tech.  She is wondering what types of patients she should not go in with.  She also recent was exposed to a patient with TB.  She is due for her [place] test but would be agreeable to a quant to Farren gold test today instead.  She has no symptoms of TB.  This is her first pregnancy.  She has a boyfriend.  This was an unplanned pregnancy but they were happy to become pregnant.  She has been on birth control for 16 years and went off birth control for a year.  She was having very light monthly menses or 1 day of spotting.  She could not have gotten pregnant before January 10 29th as her boyfriend was deployed.  Had a 1 day very light.  Mid February.  Positive pregnancy at home March 25.  Either she or her  travel to a Zika virus area within 6 months of conception.  He was deployed 2 weeks in [place] and no tropical region or mosquitoes.    Objective:  /83 (BP Location: Left arm, Patient Position: Sitting, Cuff Size: Adult Regular)   Pulse 97   Temp 98.3  F (36.8  C) (Oral)   Resp 16   Wt 93 kg (205 lb)   LMP 02/15/2022 (Approximate)   BMI 30.27 kg/m     General: No apparent distress  UPT: Positive

## 2022-04-01 LAB
B19V IGG SER IA-ACNC: 3.28 IV
B19V IGM SER IA-ACNC: 0.37 IV
GAMMA INTERFERON BACKGROUND BLD IA-ACNC: 0.05 IU/ML
M TB IFN-G BLD-IMP: NEGATIVE
M TB IFN-G CD4+ BCKGRND COR BLD-ACNC: 9.95 IU/ML
MITOGEN IGNF BCKGRD COR BLD-ACNC: -0.01 IU/ML
MITOGEN IGNF BCKGRD COR BLD-ACNC: 0 IU/ML
QUANTIFERON MITOGEN: 10 IU/ML
QUANTIFERON NIL TUBE: 0.05 IU/ML
QUANTIFERON TB1 TUBE: 0.05 IU/ML
QUANTIFERON TB2 TUBE: 0.04

## 2022-04-07 ENCOUNTER — HOSPITAL ENCOUNTER (OUTPATIENT)
Dept: ULTRASOUND IMAGING | Facility: CLINIC | Age: 32
Discharge: HOME | End: 2022-04-07
Attending: FAMILY MEDICINE | Admitting: FAMILY MEDICINE
Payer: COMMERCIAL

## 2022-04-07 DIAGNOSIS — N92.6 MISSED MENSES: ICD-10-CM

## 2022-04-07 PROCEDURE — 76801 OB US < 14 WKS SINGLE FETUS: CPT

## 2022-04-24 ENCOUNTER — HEALTH MAINTENANCE LETTER (OUTPATIENT)
Age: 32
End: 2022-04-24

## 2022-05-09 ENCOUNTER — PRENATAL OFFICE VISIT (OUTPATIENT)
Dept: FAMILY MEDICINE | Facility: CLINIC | Age: 32
End: 2022-05-09
Payer: COMMERCIAL

## 2022-05-09 VITALS
HEART RATE: 113 BPM | WEIGHT: 208 LBS | SYSTOLIC BLOOD PRESSURE: 126 MMHG | BODY MASS INDEX: 30.72 KG/M2 | RESPIRATION RATE: 16 BRPM | DIASTOLIC BLOOD PRESSURE: 74 MMHG

## 2022-05-09 DIAGNOSIS — Z34.01 ENCOUNTER FOR PRENATAL CARE IN FIRST TRIMESTER OF FIRST PREGNANCY: Primary | ICD-10-CM

## 2022-05-09 DIAGNOSIS — F10.21 ALCOHOL DEPENDENCE IN REMISSION (H): ICD-10-CM

## 2022-05-09 DIAGNOSIS — Z11.59 NEED FOR HEPATITIS C SCREENING TEST: ICD-10-CM

## 2022-05-09 DIAGNOSIS — F32.9 REACTIVE DEPRESSION: ICD-10-CM

## 2022-05-09 DIAGNOSIS — R53.83 OTHER FATIGUE: ICD-10-CM

## 2022-05-09 LAB
ABO/RH(D): NORMAL
ANTIBODY SCREEN: NEGATIVE
CLUE CELLS: NORMAL
ERYTHROCYTE [DISTWIDTH] IN BLOOD BY AUTOMATED COUNT: 12.8 % (ref 10–15)
HCT VFR BLD AUTO: 34.6 % (ref 35–47)
HGB BLD-MCNC: 11.4 G/DL (ref 11.7–15.7)
HIV 1+2 AB+HIV1 P24 AG SERPL QL IA: NEGATIVE
MCH RBC QN AUTO: 31.2 PG (ref 26.5–33)
MCHC RBC AUTO-ENTMCNC: 32.9 G/DL (ref 31.5–36.5)
MCV RBC AUTO: 95 FL (ref 78–100)
PLATELET # BLD AUTO: 308 10E3/UL (ref 150–450)
RBC # BLD AUTO: 3.65 10E6/UL (ref 3.8–5.2)
SPECIMEN EXPIRATION DATE: NORMAL
TRICHOMONAS, WET PREP: NORMAL
TSH SERPL DL<=0.005 MIU/L-ACNC: 1.39 UIU/ML (ref 0.3–5)
WBC # BLD AUTO: 7.7 10E3/UL (ref 4–11)
WBC'S/HIGH POWER FIELD, WET PREP: NORMAL
YEAST, WET PREP: NORMAL

## 2022-05-09 PROCEDURE — 85027 COMPLETE CBC AUTOMATED: CPT | Performed by: FAMILY MEDICINE

## 2022-05-09 PROCEDURE — 86780 TREPONEMA PALLIDUM: CPT | Performed by: FAMILY MEDICINE

## 2022-05-09 PROCEDURE — 86900 BLOOD TYPING SEROLOGIC ABO: CPT | Performed by: FAMILY MEDICINE

## 2022-05-09 PROCEDURE — 99215 OFFICE O/P EST HI 40 MIN: CPT | Performed by: FAMILY MEDICINE

## 2022-05-09 PROCEDURE — 36415 COLL VENOUS BLD VENIPUNCTURE: CPT | Performed by: FAMILY MEDICINE

## 2022-05-09 PROCEDURE — 87591 N.GONORRHOEAE DNA AMP PROB: CPT | Performed by: FAMILY MEDICINE

## 2022-05-09 PROCEDURE — 87210 SMEAR WET MOUNT SALINE/INK: CPT | Performed by: FAMILY MEDICINE

## 2022-05-09 PROCEDURE — G0123 SCREEN CERV/VAG THIN LAYER: HCPCS | Performed by: FAMILY MEDICINE

## 2022-05-09 PROCEDURE — 87086 URINE CULTURE/COLONY COUNT: CPT | Performed by: FAMILY MEDICINE

## 2022-05-09 PROCEDURE — 87389 HIV-1 AG W/HIV-1&-2 AB AG IA: CPT | Performed by: FAMILY MEDICINE

## 2022-05-09 PROCEDURE — 87340 HEPATITIS B SURFACE AG IA: CPT | Performed by: FAMILY MEDICINE

## 2022-05-09 PROCEDURE — 84443 ASSAY THYROID STIM HORMONE: CPT | Performed by: FAMILY MEDICINE

## 2022-05-09 PROCEDURE — 86850 RBC ANTIBODY SCREEN: CPT | Performed by: FAMILY MEDICINE

## 2022-05-09 PROCEDURE — 87491 CHLMYD TRACH DNA AMP PROBE: CPT | Performed by: FAMILY MEDICINE

## 2022-05-09 PROCEDURE — 87624 HPV HI-RISK TYP POOLED RSLT: CPT | Performed by: FAMILY MEDICINE

## 2022-05-09 PROCEDURE — 86901 BLOOD TYPING SEROLOGIC RH(D): CPT | Performed by: FAMILY MEDICINE

## 2022-05-09 PROCEDURE — 99207 PR FIRST OB VISIT: CPT | Performed by: FAMILY MEDICINE

## 2022-05-09 PROCEDURE — 82306 VITAMIN D 25 HYDROXY: CPT | Performed by: FAMILY MEDICINE

## 2022-05-09 PROCEDURE — 86762 RUBELLA ANTIBODY: CPT | Performed by: FAMILY MEDICINE

## 2022-05-09 PROCEDURE — 86803 HEPATITIS C AB TEST: CPT | Performed by: FAMILY MEDICINE

## 2022-05-09 NOTE — PROGRESS NOTES
SUBJECTIVE:     HPI: She is feeling fatigued and achy.  No nausea or vomiting when she does not eat right away in the morning.  No spotting.  Her boyfriend says she does not snore or have pauses in her breathing at night.  She has a past history of alcohol abuse but is not drinking now.  She is in remission.  She does have a history of depression and anxiety and is seeing a counselor.  She is doing well off medication.  She does wear over-the-counter compression socks but would like a prescription for stronger compression sock as she is on her feet all day at work.  She does actually work overnights.  She is working on getting enough sleep during the day.  She wonders if chiropractic care and massage are safe.    This is a 32 year old female patient,  who presents for her first obstetrical visit.    Not drinking any alcohol.      HERIBERTO: 2022, by Ultrasound.  She is 11w3d weeks.  Her cycles are regular.  Her last menstrual period was normal.   Since her LMP, she has had no complaints).   She denies vaginal bleeding.    Additional History: A great aunt had spina bifida    Have you travelled during the pregnancy?No  Have your sexual partner(s) travelled during the pregnancy?No      HISTORY:   Planned Pregnancy: No  Marital Status: Single  Occupation: ER tech  Living in Household: Significant Other    Past History:  Her past medical history:  Alcohol abuse, in remission  Depression and anxiety: Currently seeing a counselor and under good control off medication    She has a history of      Since her last LMP she denies use of alcohol, tobacco and street drugs.    Past medical, surgical, social and family history were reviewed and updated in [facility].        Current Outpatient Medications   Medication     fish oil-omega-3 fatty acids 1000 MG capsule     Prenatal Vit-Fe Fumarate-FA (PRENATAL MULTIVITAMIN W/IRON) 27-0.8 MG tablet     No current facility-administered medications for this visit.       ROS:   12 point  review of systems negative other than symptoms noted below or in the HPI.      OBJECTIVE:     EXAM:  /74 (BP Location: Left arm, Patient Position: Sitting, Cuff Size: Adult Regular)   Pulse 113   Resp 16   Wt 94.3 kg (208 lb)   LMP 02/15/2022 (Approximate)   BMI 30.72 kg/m   Body mass index is 30.72 kg/m .    GENERAL: healthy, alert and no distress  EYES: Eyes grossly normal to inspection, PERRL and conjunctivae and sclerae normal  HENT: ear canals and TM's normal, nose and mouth without ulcers or lesions  NECK: no adenopathy, no asymmetry, masses, or scars and thyroid normal to palpation  RESP: lungs clear to auscultation - no rales, rhonchi or wheezes  BREAST: normal without masses, tenderness or nipple discharge and no palpable axillary masses or adenopathy  CV: regular rate and rhythm, normal S1 S2, no S3 or S4, no murmur, click or rub, no peripheral edema and peripheral pulses strong  ABDOMEN: soft, nontender, no hepatosplenomegaly, no masses and bowel sounds normal  MS: no gross musculoskeletal defects noted, no edema  SKIN: no suspicious lesions or rashes  NEURO: Normal strength and tone, mentation intact and speech normal  PSYCH: mentation appears normal, affect normal/bright    ASSESSMENT/PLAN:       ICD-10-CM    1. Need for hepatitis C screening test  Z11.59        32 year old , 11w3d weeks of pregnancy with HERIBERTO of 2022, by Ultrasound    Discussed as follows:    Counseling given:   - Follow up in 4-6 weeks for return OB visit.  - Recommended weight gain for pregnancy: 15-25 lbs.         PLAN/PATIENT INSTRUCTIONS:    We will set your next OB appointment around 16 weeks pregnant.  We will draw the quad screen then.  That checks for trisomy 13, 18, 21 and spina bifida.    Please double check with your insurance that the quad screen is covered.    Covid booster at next visit.    Since he work in the emergency room I would agree with not being around violent patients, patients with a  fever or rash.    I did order the knee-high compression socks for you.  You can go to the medical supply showroom at the [facility] to pick them up.    With him on during the daytime and off at bedtime.    In therapy for depression.    Chiropractic massage is fine as long as you do not lay on your stomach, they do not do any treatments of your stomach and they are comfortable treating pregnant women.    At your next visit we will order the 20-week ultrasound.    I do feel like we heard a few beats of baby's heartbeat but if you would feel more comfortable with an ultrasound before next visit please let me know.    Set OB appointments every month until 28 weeks, then every 2 weeks until 36 weeks then every week until 42 weeks, to be able to change last 2 visits for baby.    TSH and Vit D.     40 minutes spent with this patient and her significant other with total time of visit as well as documentation.    Christine Sood MD

## 2022-05-09 NOTE — PATIENT INSTRUCTIONS
We will set your next OB appointment around 16 weeks pregnant.  We will draw the quad screen then.  That checks for trisomy 13, 18, 21 and spina bifida.    Please double check with your insurance that the quad screen is covered.    Covid booster at next visit.    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    I did order the knee-high compression socks for you.  You can go to the medical supply showroom at the [facility] to pick them up.    With him on during the daytime and off at bedtime.    In therapy for depression.    Chiropractic massage is fine as long as you do not lay on your stomach, they do not do any treatments of your stomach and they are comfortable treating pregnant women.    At your next visit we will order the 20-week ultrasound.    I do feel like we heard a few beats of baby's heartbeat but if you would feel more comfortable with an ultrasound before next visit please let me know.    Set OB appointments every month until 28 weeks, then every 2 weeks until 36 weeks then every week until 42 weeks, to be able to change last 2 visits for baby.    TSH and Vit D.

## 2022-05-10 LAB
C TRACH DNA SPEC QL NAA+PROBE: NEGATIVE
DEPRECATED CALCIDIOL+CALCIFEROL SERPL-MC: 37 UG/L (ref 20–75)
HBV SURFACE AG SERPL QL IA: NONREACTIVE
HCV AB SERPL QL IA: NONREACTIVE
N GONORRHOEA DNA SPEC QL NAA+PROBE: NEGATIVE
RUBV IGG SERPL QL IA: 1.12 INDEX
RUBV IGG SERPL QL IA: POSITIVE
T PALLIDUM AB SER QL: NONREACTIVE

## 2022-05-11 ENCOUNTER — MYC MEDICAL ADVICE (OUTPATIENT)
Dept: FAMILY MEDICINE | Facility: CLINIC | Age: 32
End: 2022-05-11
Payer: COMMERCIAL

## 2022-05-11 DIAGNOSIS — O36.8390 NON-REASSURING FETAL HEART TONES COMPLICATING PREGNANCY, ANTEPARTUM: Primary | ICD-10-CM

## 2022-05-11 LAB
BACTERIA UR CULT: NORMAL
HUMAN PAPILLOMA VIRUS 16 DNA: NEGATIVE
HUMAN PAPILLOMA VIRUS 18 DNA: NEGATIVE
HUMAN PAPILLOMA VIRUS FINAL DIAGNOSIS: NORMAL
HUMAN PAPILLOMA VIRUS OTHER HR: NEGATIVE

## 2022-05-12 ENCOUNTER — HOSPITAL ENCOUNTER (OUTPATIENT)
Dept: ULTRASOUND IMAGING | Facility: CLINIC | Age: 32
Discharge: HOME | End: 2022-05-12
Attending: FAMILY MEDICINE | Admitting: FAMILY MEDICINE
Payer: COMMERCIAL

## 2022-05-12 DIAGNOSIS — O36.8390 NON-REASSURING FETAL HEART TONES COMPLICATING PREGNANCY, ANTEPARTUM: ICD-10-CM

## 2022-05-12 PROCEDURE — 76801 OB US < 14 WKS SINGLE FETUS: CPT

## 2022-05-16 LAB
BKR LAB AP GYN ADEQUACY: NORMAL
BKR LAB AP GYN INTERPRETATION: NORMAL
BKR LAB AP HPV REFLEX: NORMAL
BKR LAB AP PREVIOUS ABNORMAL: NORMAL
PATH REPORT.COMMENTS IMP SPEC: NORMAL
PATH REPORT.COMMENTS IMP SPEC: NORMAL
PATH REPORT.RELEVANT HX SPEC: NORMAL

## 2022-06-15 ENCOUNTER — PRENATAL OFFICE VISIT (OUTPATIENT)
Dept: FAMILY MEDICINE | Facility: CLINIC | Age: 32
End: 2022-06-15
Payer: COMMERCIAL

## 2022-06-15 VITALS
HEART RATE: 84 BPM | BODY MASS INDEX: 31.23 KG/M2 | WEIGHT: 211.5 LBS | TEMPERATURE: 98.2 F | DIASTOLIC BLOOD PRESSURE: 78 MMHG | SYSTOLIC BLOOD PRESSURE: 119 MMHG | RESPIRATION RATE: 16 BRPM

## 2022-06-15 DIAGNOSIS — Z34.02 ENCOUNTER FOR SUPERVISION OF LOW-RISK FIRST PREGNANCY IN SECOND TRIMESTER: ICD-10-CM

## 2022-06-15 DIAGNOSIS — Z23 HIGH PRIORITY FOR 2019-NCOV VACCINE: Primary | ICD-10-CM

## 2022-06-15 LAB — HGB BLD-MCNC: 11.9 G/DL (ref 11.7–15.7)

## 2022-06-15 PROCEDURE — 36415 COLL VENOUS BLD VENIPUNCTURE: CPT | Performed by: FAMILY MEDICINE

## 2022-06-15 PROCEDURE — 81511 FTL CGEN ABNOR FOUR ANAL: CPT | Mod: 90 | Performed by: FAMILY MEDICINE

## 2022-06-15 PROCEDURE — 85018 HEMOGLOBIN: CPT | Performed by: FAMILY MEDICINE

## 2022-06-15 PROCEDURE — 91305 COVID-19,PF,PFIZER (12+ YRS): CPT | Performed by: FAMILY MEDICINE

## 2022-06-15 PROCEDURE — 99207 PR PRENATAL VISIT: CPT | Performed by: FAMILY MEDICINE

## 2022-06-15 PROCEDURE — 0054A COVID-19,PF,PFIZER (12+ YRS): CPT | Performed by: FAMILY MEDICINE

## 2022-06-15 PROCEDURE — 99000 SPECIMEN HANDLING OFFICE-LAB: CPT | Performed by: FAMILY MEDICINE

## 2022-06-15 NOTE — PATIENT INSTRUCTIONS
For acid reflux, burping or stomach upset, you can take Tums max of 4 in a day if 500 mg tabs, or max of 2000 mg per day, and Pepcid 20 mg twice a day as needed. If that is not helping, Dr. Sood can prescribe Protonix.      Set OB appointments every month until 28 weeks, then every 2 weeks until 36 weeks then every week until 42 weeks, to be able to change last 2 visits for baby.    Covid booster today.    Ordered 20 wee OB ultrasound.    Hemoglobin and Quad screen today.    In therapy and doing well.    Can call  for US if they do not call you.

## 2022-06-15 NOTE — PROGRESS NOTES
S:  No quickening yet.  No bleeding. wearing compression socks at work.  Nausea if she goes too long without a snack.  All prenatal labs were normal but with mild anemia with a hemoglobin of 11.4.  Thyroid and vitamin D last visit normal.  She would like a quad screen.  She does have a family history of spina bifida and her great aunt.  She feels her mood is well.  She is in therapy and also doing EMDR.  Her fatigue has improved.  No quickening yet.    O:  DTR 2+, see flowsheet    A/P: 32-year-old  1 para 0 female at 16-5/7 weeks gestation.  For acid reflux, burping or stomach upset, you can take Tums max of 4 in a day if 500 mg tabs, or max of 2000 mg per day, and Pepcid 20 mg twice a day as needed. If that is not helping, Dr. Sood can prescribe Protonix.    Set OB appointments every month until 28 weeks, then every 2 weeks until 36 weeks then every week until 42 weeks, to be able to change last 2 visits for baby.  Covid booster today.  Ordered 20 wee OB ultrasound.  Hemoglobin and Quad screen today.  In therapy and doing well.  Can call  for US if they do not call you.

## 2022-06-17 LAB
# FETUSES US: NORMAL
AFP MOM SERPL: 1.52
AFP SERPL-MCNC: 45 NG/ML
AGE - REPORTED: 32.7 YR
CURRENT SMOKER: NO
FAMILY MEMBER DISEASES HX: NO
GA METHOD: NORMAL
GA: NORMAL WK
HCG MOM SERPL: 1.55
HCG SERPL-ACNC: NORMAL IU/L
HX OF HEREDITARY DISORDERS: NO
IDDM PATIENT QL: NO
INHIBIN A MOM SERPL: 0.76
INHIBIN A SERPL-MCNC: 103 PG/ML
INTEGRATED SCN PATIENT-IMP: NORMAL
PATHOLOGY STUDY: NORMAL
SPECIMEN DRAWN SERPL: NORMAL
U ESTRIOL MOM SERPL: 0.87
U ESTRIOL SERPL-MCNC: 0.89 NG/ML

## 2022-07-08 ENCOUNTER — HOSPITAL ENCOUNTER (OUTPATIENT)
Dept: ULTRASOUND IMAGING | Facility: CLINIC | Age: 32
Discharge: HOME | End: 2022-07-08
Attending: FAMILY MEDICINE | Admitting: FAMILY MEDICINE
Payer: COMMERCIAL

## 2022-07-08 DIAGNOSIS — Z34.02 ENCOUNTER FOR SUPERVISION OF LOW-RISK FIRST PREGNANCY IN SECOND TRIMESTER: ICD-10-CM

## 2022-07-08 PROCEDURE — 76805 OB US >/= 14 WKS SNGL FETUS: CPT

## 2022-07-13 ENCOUNTER — PRENATAL OFFICE VISIT (OUTPATIENT)
Dept: FAMILY MEDICINE | Facility: CLINIC | Age: 32
End: 2022-07-13
Payer: COMMERCIAL

## 2022-07-13 VITALS
BODY MASS INDEX: 32.51 KG/M2 | WEIGHT: 220.13 LBS | DIASTOLIC BLOOD PRESSURE: 68 MMHG | HEART RATE: 89 BPM | RESPIRATION RATE: 18 BRPM | TEMPERATURE: 97.7 F | SYSTOLIC BLOOD PRESSURE: 101 MMHG

## 2022-07-13 DIAGNOSIS — T78.40XA ALLERGIC REACTION, INITIAL ENCOUNTER: ICD-10-CM

## 2022-07-13 DIAGNOSIS — R93.89 ABNORMAL FINDING ON IMAGING: ICD-10-CM

## 2022-07-13 DIAGNOSIS — Z34.02 ENCOUNTER FOR SUPERVISION OF LOW-RISK FIRST PREGNANCY IN SECOND TRIMESTER: Primary | ICD-10-CM

## 2022-07-13 PROCEDURE — 86003 ALLG SPEC IGE CRUDE XTRC EA: CPT | Mod: 59 | Performed by: FAMILY MEDICINE

## 2022-07-13 PROCEDURE — 99207 PR PRENATAL VISIT: CPT | Performed by: FAMILY MEDICINE

## 2022-07-13 PROCEDURE — 36415 COLL VENOUS BLD VENIPUNCTURE: CPT | Performed by: FAMILY MEDICINE

## 2022-07-13 NOTE — LETTER
Abimbola Monreal  1990      7-13-22      To Whom it May Concern:      This is a prescription for a double electric breast pump and supplies.    Diagnosis: Breast-feeding mother  Z39.1        Sincerely,        Christine Sood MD    NPI #  [id number]

## 2022-07-13 NOTE — PROGRESS NOTES
S:  Possible shellfish allergy.  Recently ate some shrimp and mouth got tingly and lips swelled.  Has not happened before. No associated respiratory difficulty or throat tightening.  Ultrasound done 2022.  Normal fetal survey except a mildly low-lying placenta at 2.2 cm from the cervix and could not assess fetal face.  She has had minimal GERD.  Last visit hemoglobin 11.9 and quad screen negative.  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  She is doing her best to keep her self safe and not do a lot of lifting.  She does feel she has some help if needed.    O: Deep tendon reflexes 2+, see flowsheet    A/P: 32-year-old  1 para 0 female at 20-5/7 weeks gestation.  Compression socks ordered to  and th [facility].  No intercourse until placenta has move up and away from cervix.  Recheck ultrasound in 6 weeks.  No lifting over 20 lbs and keep you self safe at work.  Breast pump order, can get at the [facility].  Set OB appointments every month until 28 weeks, then every 2 weeks until 36 weeks then every week until 42 weeks, to be able to change last 2 visits for baby.  Next visit is the 1 hour glucose test, Hgb and Syphilis. Let them know when you ckeck in.  Can set up childbirth and lactation education if you wish..  For acid reflux, burping or stomach upset, you can take Tums max of 4 in a day if 500 mg tabs, or max of 2000 mg per day, and Pepcid 20 mg twice a day as needed. If that is not helping, Dr. Sood can prescribe Protonix.    In therapy.  Shellfish allergy panel today.  We can help patient set some future appointments at the next visit.  She does have 2 more set up now.

## 2022-07-13 NOTE — PATIENT INSTRUCTIONS
Compression socks ordered to  and th [facility].    No intercourse until placenta has move up and away from cervix.    Recheck ultrasound in 6 weeks.    No lifting over 20 lbs and keep you self safe at work.    Breast pump order, can get at the [facility].    Set OB appointments every month until 28 weeks, then every 2 weeks until 36 weeks then every week until 42 weeks, to be able to change last 2 visits for baby.    Next visit is the 1 hour glucose test, Hgb and Syphilis. Let them know when you ckeck in.    Can set up childbirth and lactation education if you wish..    For acid reflux, burping or stomach upset, you can take Tums max of 4 in a day if 500 mg tabs, or max of 2000 mg per day, and Pepcid 20 mg twice a day as needed. If that is not helping, Dr. Sood can prescribe Protonix.      In therapy.    Shellfish allergy panel today.

## 2022-07-14 LAB
CLAM IGE QN: <0.1 KU(A)/L
CRAB IGE QN: 0.1 KU(A)/L
LOBSTER IGE QN: <0.1 KU(A)/L
OYSTER IGE QN: <0.1 KU(A)/L
SCALLOP IGE QN: <0.1 KU(A)/L
SHRIMP IGE QN: 0.33 KU(A)/L

## 2022-08-10 ENCOUNTER — PRENATAL OFFICE VISIT (OUTPATIENT)
Dept: FAMILY MEDICINE | Facility: CLINIC | Age: 32
End: 2022-08-10
Payer: COMMERCIAL

## 2022-08-10 VITALS
RESPIRATION RATE: 16 BRPM | HEART RATE: 103 BPM | BODY MASS INDEX: 33.39 KG/M2 | SYSTOLIC BLOOD PRESSURE: 119 MMHG | TEMPERATURE: 97 F | DIASTOLIC BLOOD PRESSURE: 80 MMHG | WEIGHT: 226.13 LBS

## 2022-08-10 DIAGNOSIS — Z34.02 ENCOUNTER FOR SUPERVISION OF LOW-RISK FIRST PREGNANCY IN SECOND TRIMESTER: Primary | ICD-10-CM

## 2022-08-10 LAB
GLUCOSE 1H P 50 G GLC PO SERPL-MCNC: 93 MG/DL (ref 70–129)
HGB BLD-MCNC: 12 G/DL (ref 11.7–15.7)

## 2022-08-10 PROCEDURE — 36415 COLL VENOUS BLD VENIPUNCTURE: CPT | Performed by: FAMILY MEDICINE

## 2022-08-10 PROCEDURE — 85018 HEMOGLOBIN: CPT | Performed by: FAMILY MEDICINE

## 2022-08-10 PROCEDURE — 82950 GLUCOSE TEST: CPT | Performed by: FAMILY MEDICINE

## 2022-08-10 PROCEDURE — 99207 PR PRENATAL VISIT: CPT | Performed by: FAMILY MEDICINE

## 2022-08-10 PROCEDURE — 86780 TREPONEMA PALLIDUM: CPT | Performed by: FAMILY MEDICINE

## 2022-08-10 NOTE — PATIENT INSTRUCTIONS
Today 1 hour glucose, Hgb and syphilis.    Ultrasound set for Aug. 19.    No intercourse until placenta no longer low lying.    You have a shrimp and crab allergy, good idea to avoid all shellfish.    Follow-up in 4 weeks.

## 2022-08-10 NOTE — PROGRESS NOTES
S:  No contractions.  Feeling more fetal movement.  Occasional acid reflux and will use Tums here and there.  No lower extremity edema.  Recent blood test did show she was allergic to crab and shrimp.  No concerns.    O;  DTR 2++, see flowsheet    A/P: 32-year-old  1 para 0 female at 24-5/7 weeks pregnant.  O+ blood type.  Low-lying placenta.  Allergic to crab and shrimp.  Today 1 hour glucose, Hgb and syphilis.  Ultrasound set for Aug. 19.  No intercourse until placenta no longer low lying.  You have a shrimp and crab allergy, good idea to avoid all shellfish.  Follow-up in 4 weeks.    The physician's assistant student took the initial history and then I went in and took a history and did the exam.

## 2022-08-11 LAB — T PALLIDUM AB SER QL: NONREACTIVE

## 2022-08-19 ENCOUNTER — HOSPITAL ENCOUNTER (OUTPATIENT)
Dept: ULTRASOUND IMAGING | Facility: CLINIC | Age: 32
Discharge: HOME | End: 2022-08-19
Attending: FAMILY MEDICINE | Admitting: FAMILY MEDICINE
Payer: COMMERCIAL

## 2022-08-19 DIAGNOSIS — R93.89 ABNORMAL FINDING ON IMAGING: ICD-10-CM

## 2022-08-19 PROCEDURE — 76816 OB US FOLLOW-UP PER FETUS: CPT

## 2022-09-07 ENCOUNTER — PRENATAL OFFICE VISIT (OUTPATIENT)
Dept: FAMILY MEDICINE | Facility: CLINIC | Age: 32
End: 2022-09-07
Payer: COMMERCIAL

## 2022-09-07 VITALS
TEMPERATURE: 97.8 F | BODY MASS INDEX: 34.15 KG/M2 | DIASTOLIC BLOOD PRESSURE: 81 MMHG | WEIGHT: 231.25 LBS | RESPIRATION RATE: 16 BRPM | HEART RATE: 116 BPM | SYSTOLIC BLOOD PRESSURE: 120 MMHG

## 2022-09-07 DIAGNOSIS — Z34.03 ENCOUNTER FOR SUPERVISION OF NORMAL FIRST PREGNANCY IN THIRD TRIMESTER: Primary | ICD-10-CM

## 2022-09-07 DIAGNOSIS — F10.21 ALCOHOL DEPENDENCE IN REMISSION (H): ICD-10-CM

## 2022-09-07 DIAGNOSIS — F32.9 REACTIVE DEPRESSION: ICD-10-CM

## 2022-09-07 DIAGNOSIS — H53.9 VISION CHANGES: ICD-10-CM

## 2022-09-07 PROCEDURE — 90471 IMMUNIZATION ADMIN: CPT | Performed by: FAMILY MEDICINE

## 2022-09-07 PROCEDURE — 99207 PR PRENATAL VISIT: CPT | Performed by: FAMILY MEDICINE

## 2022-09-07 PROCEDURE — 90715 TDAP VACCINE 7 YRS/> IM: CPT | Performed by: FAMILY MEDICINE

## 2022-09-07 ASSESSMENT — PATIENT HEALTH QUESTIONNAIRE - PHQ9
10. IF YOU CHECKED OFF ANY PROBLEMS, HOW DIFFICULT HAVE THESE PROBLEMS MADE IT FOR YOU TO DO YOUR WORK, TAKE CARE OF THINGS AT HOME, OR GET ALONG WITH OTHER PEOPLE: NOT DIFFICULT AT ALL
SUM OF ALL RESPONSES TO PHQ QUESTIONS 1-9: 0
SUM OF ALL RESPONSES TO PHQ QUESTIONS 1-9: 0

## 2022-09-07 NOTE — PROGRESS NOTES
"S:  Some mild shortness of breath with exertion.  Not pleuritic.  Wears [employer] socks for work.  No regualr contractions.  Seeing \"sparkles\" in peripheral vision on and off.  Like if she would rub her eyes but did not.  Happened about 5 times since last month.  Not with changing positions.  Can happen with when standing or movement.  BP normal 115 /70-80.  No blurred vision or RUQ abdominal pain.  No headache.  No migraine since a teen.  Last visit negative syphilis, hemoglobin 12.0 and 1 hour glucose 93.  She did have a recheck of the ultrasound and no longer has low-lying placenta.  Placenta is 4 cm from the cervical os.  Fetal growth 44th percentile.  Cervix 4 cm and closed.  Patient is working in the emergency room as a tech.  She is trying to stay out of high risk situations.  She agrees it is not safe to be around persons under investigation for COVID or COVID-positive patients.  She is a nursing student and her spouse is in the  and is home from deployment.  He has 20 years of service so could retire if he wishes.  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  She is currently seeing a therapist for depression and feels stable.    O:  DTR 2+, see flowsheet    A/P:   32-year-old  1 para 0 female at 28-5/7 weeks gestation.  Occasional squeeze or contraction is normal.  If you are before 37 weeks and you feel 3 or more squeezes in an hour sit drink a big glass of water if it persists we do want you evaluated at labor and delivery.  Visual changes seem like possible visual or ophthalmologic migraine or possible muscle spasm but I would like you to visit with an eye doctor, MD.  Referral given to [facility] in [place].  Please call to set that appointment.  Ask for an MD.  Flu shot in Oct.   I did a letter today for work to not be around patients under investigation for COVID or COVID patients.  Tdap shot today.  Next visit in 2 weeks.  At that visit and all future visits we will " collect a urine sample.  Cervical check today and then will start that again at 34 weeks.  At 36 weeks we will do the group B strep, hemoglobin and a hand-held ultrasound to make sure baby is head down.  Answers for HPI/ROS submitted by the patient on 9/7/2022  If you checked off any problems, how difficult have these problems made it for you to do your work, take care of things at home, or get along with other people?: Not difficult at all  PHQ9 TOTAL SCORE: 0

## 2022-09-07 NOTE — LETTER
Abimbola GUERIN Jocelin  1990      9-7-22      To whom it May Concern:    This patient is in her third trimester of pregnancy.  I do not feel she should be around patients under investigation for COVID or active COVID patients.      Please feel free to contact me with any questions or concerns.        Sincerely,        Christine Sood MD

## 2022-09-07 NOTE — PATIENT INSTRUCTIONS
Occasional squeeze or contraction is normal.  If you are before 37 weeks and you feel 3 or more squeezes in an hour sit drink a big glass of water if it persists we do want you evaluated at labor and delivery.    Visual changes seem like possible visual or ophthalmologic migraine or possible muscle spasm but I would like you to visit with an eye doctor, MD.    Referral given to [facility] in [place].  Please call to set that appointment.  Ask for an MD.    Flu shot in Oct.     I did a letter today for work to not be around patients under investigation for COVID or COVID patients.    Tdap shot today.    Next visit in 2 weeks.  At that visit and all future visits we will collect a urine sample.    Cervical check today and then will start that again at 34 weeks.    At 36 weeks we will do the group B strep, hemoglobin and a hand-held ultrasound to make sure baby is head down.

## 2022-09-21 ENCOUNTER — PRENATAL OFFICE VISIT (OUTPATIENT)
Dept: FAMILY MEDICINE | Facility: CLINIC | Age: 32
End: 2022-09-21
Payer: COMMERCIAL

## 2022-09-21 VITALS
DIASTOLIC BLOOD PRESSURE: 79 MMHG | WEIGHT: 235 LBS | SYSTOLIC BLOOD PRESSURE: 128 MMHG | RESPIRATION RATE: 16 BRPM | HEART RATE: 97 BPM | BODY MASS INDEX: 34.7 KG/M2 | TEMPERATURE: 98.5 F

## 2022-09-21 DIAGNOSIS — Z34.03 ENCOUNTER FOR SUPERVISION OF NORMAL FIRST PREGNANCY IN THIRD TRIMESTER: Primary | ICD-10-CM

## 2022-09-21 LAB
ALBUMIN UR-MCNC: NEGATIVE MG/DL
APPEARANCE UR: CLEAR
BILIRUB UR QL STRIP: NEGATIVE
COLOR UR AUTO: YELLOW
GLUCOSE UR STRIP-MCNC: NEGATIVE MG/DL
HGB UR QL STRIP: NEGATIVE
KETONES UR STRIP-MCNC: NEGATIVE MG/DL
LEUKOCYTE ESTERASE UR QL STRIP: NEGATIVE
NITRATE UR QL: NEGATIVE
PH UR STRIP: 6 [PH] (ref 5–8)
SP GR UR STRIP: 1.02 (ref 1–1.03)
UROBILINOGEN UR STRIP-ACNC: 0.2 E.U./DL

## 2022-09-21 PROCEDURE — 99207 PR PRENATAL VISIT: CPT | Performed by: FAMILY MEDICINE

## 2022-09-21 PROCEDURE — 81003 URINALYSIS AUTO W/O SCOPE: CPT | Performed by: FAMILY MEDICINE

## 2022-09-21 NOTE — PROGRESS NOTES
S: Good fetal movement.  She is having some acid reflux mostly at night.  She is using some Tums.  Some round ligament pain.  Not having any regular contractions.  Last visit she had mentioned some peripheral vision changes seeing some sparkles.  That has not persisted.  She did cancel the appointment with the eye doctor.    O:  DTR 2+, UA neg, see flowsheet    A/P: 32-year-old  1 para 0 female at 30-5/7 weeks gestation.  GERD.  For acid reflux, burping or stomach upset, you can take Tums max of 4 in a day if 500 mg tabs, or max of 2000 mg per day, and Pepcid 20 mg twice a day as needed. If that is not helping, Dr. Sood can prescribe Protonix.    Starting around 34 weeks we will start cervical checks.  At 36 weeks we will check position of baby with a hand-held ultrasound in clinic.  At 36 weeks is the group B strep swab and hemoglobin.  If you feel you have 3 or more squeezes or contractions in an hour and you are before 37 weeks pregnant we do want you monitored at labor and delivery.  If any questions about labor or other pregnancy questions that are more urgent give a call to [facility] labor and delivery at [phone].  Down the road when you are in labor you do want to give a call to [facility] before going in.  As long as you are not having any other visual changes you would need to see the eye doctor at this point.  1 or less caffeinated product a day is safe in pregnancy but less is best if you have acid reflux.  Also you will want to try to cut out caffeine completely for breast-feeding.  Flu shot in October.  If you are wishing the new bivalent COVID shot please let me know and I will research the safety in pregnancy.  I did research and ACOG does recommend the new bivalent COVID-vaccine in pregnancy.

## 2022-09-21 NOTE — PATIENT INSTRUCTIONS
For acid reflux, burping or stomach upset, you can take Tums max of 4 in a day if 500 mg tabs, or max of 2000 mg per day, and Pepcid 20 mg twice a day as needed. If that is not helping, Dr. Sood can prescribe Protonix.      Starting around 34 weeks we will start cervical checks.  At 36 weeks we will check position of baby with a hand-held ultrasound in clinic.    At 36 weeks is the group B strep swab and hemoglobin.    If you feel you have 3 or more squeezes or contractions in an hour and you are before 37 weeks pregnant we do want you monitored at labor and delivery.    If any questions about labor or other pregnancy questions that are more urgent give a call to [facility] labor and delivery at [phone].    Down the road when you are in labor you do want to give a call to [facility] before going in.    As long as you are not having any other visual changes you would need to see the eye doctor at this point.    1 or less caffeinated product a day is safe in pregnancy but less is best if you have acid reflux.  Also you will want to try to cut out caffeine completely for breast-feeding.    Flu shot in October.    If you are wishing the new bivalent COVID shot please let me know and I will research the safety in pregnancy.

## 2022-09-22 ENCOUNTER — MYC MEDICAL ADVICE (OUTPATIENT)
Dept: FAMILY MEDICINE | Facility: CLINIC | Age: 32
End: 2022-09-22

## 2022-09-22 DIAGNOSIS — K21.9 GASTROESOPHAGEAL REFLUX DISEASE WITHOUT ESOPHAGITIS: Primary | ICD-10-CM

## 2022-09-22 RX ORDER — PANTOPRAZOLE SODIUM 20 MG/1
40 TABLET, DELAYED RELEASE ORAL DAILY
Qty: 90 TABLET | Refills: 3 | Status: SHIPPED | OUTPATIENT
Start: 2022-09-22 | End: 2022-09-27

## 2022-09-27 ENCOUNTER — MYC MEDICAL ADVICE (OUTPATIENT)
Dept: FAMILY MEDICINE | Facility: CLINIC | Age: 32
End: 2022-09-27

## 2022-09-27 DIAGNOSIS — K21.9 GASTROESOPHAGEAL REFLUX DISEASE WITHOUT ESOPHAGITIS: Primary | ICD-10-CM

## 2022-09-27 RX ORDER — PANTOPRAZOLE SODIUM 40 MG/1
40 TABLET, DELAYED RELEASE ORAL DAILY
Qty: 90 TABLET | Refills: 3 | Status: SHIPPED | OUTPATIENT
Start: 2022-09-27 | End: 2022-09-28

## 2022-09-27 NOTE — TELEPHONE ENCOUNTER
Please add patient on at 4:20 tomorrow 9-28-22  coming at 4:00 for rooming.  She is aware.  Thank you.

## 2022-09-28 ENCOUNTER — PRENATAL OFFICE VISIT (OUTPATIENT)
Dept: FAMILY MEDICINE | Facility: CLINIC | Age: 32
End: 2022-09-28
Payer: COMMERCIAL

## 2022-09-28 VITALS
RESPIRATION RATE: 20 BRPM | SYSTOLIC BLOOD PRESSURE: 119 MMHG | HEART RATE: 94 BPM | BODY MASS INDEX: 35.35 KG/M2 | TEMPERATURE: 98.1 F | DIASTOLIC BLOOD PRESSURE: 81 MMHG | WEIGHT: 239.38 LBS

## 2022-09-28 DIAGNOSIS — R00.0 TACHYCARDIA: ICD-10-CM

## 2022-09-28 DIAGNOSIS — K21.9 GASTROESOPHAGEAL REFLUX DISEASE WITHOUT ESOPHAGITIS: ICD-10-CM

## 2022-09-28 DIAGNOSIS — Z34.03 ENCOUNTER FOR SUPERVISION OF NORMAL FIRST PREGNANCY IN THIRD TRIMESTER: Primary | ICD-10-CM

## 2022-09-28 DIAGNOSIS — R00.0 ELEVATED PULSE RATE: ICD-10-CM

## 2022-09-28 LAB
ALBUMIN MFR UR ELPH: 7.8 MG/DL
ALBUMIN UR-MCNC: NEGATIVE MG/DL
ALT SERPL W P-5'-P-CCNC: 49 U/L (ref 10–35)
APPEARANCE UR: CLEAR
AST SERPL W P-5'-P-CCNC: 39 U/L (ref 10–35)
ATRIAL RATE - MUSE: 89 BPM
BILIRUB UR QL STRIP: NEGATIVE
COLOR UR AUTO: YELLOW
CREAT SERPL-MCNC: 0.38 MG/DL (ref 0.51–0.95)
CREAT UR-MCNC: 40.2 MG/DL
DIASTOLIC BLOOD PRESSURE - MUSE: NORMAL MMHG
GFR SERPL CREATININE-BSD FRML MDRD: >90 ML/MIN/1.73M2
GLUCOSE UR STRIP-MCNC: NEGATIVE MG/DL
HGB BLD-MCNC: 12.5 G/DL (ref 11.7–15.7)
HGB UR QL STRIP: NEGATIVE
INTERPRETATION ECG - MUSE: NORMAL
KETONES UR STRIP-MCNC: NEGATIVE MG/DL
LEUKOCYTE ESTERASE UR QL STRIP: ABNORMAL
NITRATE UR QL: NEGATIVE
P AXIS - MUSE: 4 DEGREES
PH UR STRIP: 5.5 [PH] (ref 5–8)
PLATELET # BLD AUTO: 389 10E3/UL (ref 150–450)
PR INTERVAL - MUSE: 146 MS
PROT/CREAT 24H UR: 0.19 MG/MG CR (ref 0–0.2)
QRS DURATION - MUSE: 78 MS
QT - MUSE: 346 MS
QTC - MUSE: 420 MS
R AXIS - MUSE: -1 DEGREES
SP GR UR STRIP: 1.02 (ref 1–1.03)
SYSTOLIC BLOOD PRESSURE - MUSE: NORMAL MMHG
T AXIS - MUSE: 41 DEGREES
TSH SERPL DL<=0.005 MIU/L-ACNC: 2.73 UIU/ML (ref 0.3–4.2)
UROBILINOGEN UR STRIP-ACNC: 0.2 E.U./DL
VENTRICULAR RATE- MUSE: 89 BPM
WBC # BLD AUTO: 16.2 10E3/UL (ref 4–11)

## 2022-09-28 PROCEDURE — 93010 ELECTROCARDIOGRAM REPORT: CPT | Performed by: INTERNAL MEDICINE

## 2022-09-28 PROCEDURE — 84443 ASSAY THYROID STIM HORMONE: CPT | Performed by: FAMILY MEDICINE

## 2022-09-28 PROCEDURE — 84460 ALANINE AMINO (ALT) (SGPT): CPT | Performed by: FAMILY MEDICINE

## 2022-09-28 PROCEDURE — 93005 ELECTROCARDIOGRAM TRACING: CPT | Performed by: FAMILY MEDICINE

## 2022-09-28 PROCEDURE — 99214 OFFICE O/P EST MOD 30 MIN: CPT | Performed by: FAMILY MEDICINE

## 2022-09-28 PROCEDURE — 84450 TRANSFERASE (AST) (SGOT): CPT | Performed by: FAMILY MEDICINE

## 2022-09-28 PROCEDURE — 82565 ASSAY OF CREATININE: CPT | Performed by: FAMILY MEDICINE

## 2022-09-28 PROCEDURE — 81003 URINALYSIS AUTO W/O SCOPE: CPT | Performed by: FAMILY MEDICINE

## 2022-09-28 PROCEDURE — 85048 AUTOMATED LEUKOCYTE COUNT: CPT | Performed by: FAMILY MEDICINE

## 2022-09-28 PROCEDURE — 99207 PR PRENATAL VISIT: CPT | Performed by: FAMILY MEDICINE

## 2022-09-28 PROCEDURE — 85018 HEMOGLOBIN: CPT | Performed by: FAMILY MEDICINE

## 2022-09-28 PROCEDURE — 85049 AUTOMATED PLATELET COUNT: CPT | Performed by: FAMILY MEDICINE

## 2022-09-28 PROCEDURE — 84156 ASSAY OF PROTEIN URINE: CPT | Performed by: FAMILY MEDICINE

## 2022-09-28 PROCEDURE — 36415 COLL VENOUS BLD VENIPUNCTURE: CPT | Performed by: FAMILY MEDICINE

## 2022-09-28 RX ORDER — PANTOPRAZOLE SODIUM 20 MG/1
20 TABLET, DELAYED RELEASE ORAL DAILY
Qty: 90 TABLET | Refills: 3 | Status: SHIPPED | OUTPATIENT
Start: 2022-09-28 | End: 2023-01-16

## 2022-09-28 NOTE — PATIENT INSTRUCTIONS
EKG is normal.    I am ordering a cardiac event monitor for you to wear as long as you need to be able to  1 or 2 episodes of racing heart.    Today we are to get some labs to be on the safe side.  Ordering a white count, urine protein, platelets, hemoglobin, kidney function liver function and thyroid function.    Please check your blood pressure on an arm cuff twice a week.  If it anytime the top number is over 140 or the bottom numbers over 90 let me know and we will prescribe a medication called labetalol.    Recommend being off work through Sunday, October 2, returning Monday, October 3 if feeling well.  Work note done.    Next OB visit in 1 week.    Certainly if symptoms are getting worse or any associated symptoms of chest pain, shortness of breath lightheadedness present to the emergency room.    I did send a prescription for pantoprazole 20 mg daily to your pharmacy.  This is a daily medication.  That should be ready today.    Continue the Tums as needed up to 4 in a day, for the next 3 to 7 days you could also use Pepcid 20 mg twice a day until that Protonix kicks in.  The Pepcid is an as needed medication.    If feeling 3 or more squeezes or contractions in an hour you do want to be monitored at the hospital.

## 2022-09-28 NOTE — LETTER
Abimbola GUERIN Jocelin  1990      9-28-22    To whom it may concern:      This patient is pregnant and having tachycardia episodes.  I am recommending she be off work through Sunday, October 2 and can return Monday, October 3 if feeling well.          Sincerely,        Christine Sood MD

## 2022-09-28 NOTE — PROGRESS NOTES
S:  Worsening GERD for the past week.  Over the past week getting more sweaty.  Racing heart started 3 days ago.  No irregular heart beat.  Up to 140 on 3 episodes and associated sweating.  No associated chest pain or shortness of breath or lightheaded.  Lays down and resolves to the 90's.  Focused on hydration.  No caffeine.  Feeling generally unwell for about a week.  No fever.  Good fetal movment.. No uterine or abdominal pain.  No rash or feeling itchy.  Does not complain of headache.  She did take her home blood pressure at home cuff and it was 120s over 99.  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  Not having any contractions.  No edema.  Not feeling anxious.    O:  EKG normal, UA-Tr MED, see flowsheet    A/P: 32-year-old  1 para 0 female at 31-5/7 weeks gestation.  Having tachycardia episodes.  Worsening acid reflux.  Not feeling well.  EKG is normal.  I am ordering a cardiac event monitor for you to wear as long as you need to be able to  1 or 2 episodes of racing heart.  Today we are to get some labs to be on the safe side.  Ordering a white count, urine protein, platelets, hemoglobin, kidney function liver function and thyroid function.  Please check your blood pressure on an arm cuff twice a week.  If it anytime the top number is over 140 or the bottom numbers over 90 let me know and we will prescribe a medication called labetalol.  Recommend being off work through , , returning Monday, October 3 if feeling well.  Work note done.  Next OB visit in 1 week.  Certainly if symptoms are getting worse or any associated symptoms of chest pain, shortness of breath lightheadedness present to the emergency room.  I did send a prescription for pantoprazole 20 mg daily to your pharmacy.  This is a daily medication.  That should be ready today.  Continue the Tums as needed up to 4 in a day, for the next 3 to 7 days you could also use Pepcid 20 mg twice a day until that  Protonix kicks in.  The Pepcid is an as needed medication.  If feeling 3 or more squeezes or contractions in an hour you do want to be monitored at the hospital.

## 2022-10-05 ENCOUNTER — PRENATAL OFFICE VISIT (OUTPATIENT)
Dept: FAMILY MEDICINE | Facility: CLINIC | Age: 32
End: 2022-10-05
Payer: COMMERCIAL

## 2022-10-05 VITALS
HEIGHT: 69 IN | HEART RATE: 112 BPM | DIASTOLIC BLOOD PRESSURE: 73 MMHG | RESPIRATION RATE: 16 BRPM | BODY MASS INDEX: 35.73 KG/M2 | SYSTOLIC BLOOD PRESSURE: 116 MMHG | WEIGHT: 241.2 LBS

## 2022-10-05 DIAGNOSIS — Z34.03 ENCOUNTER FOR SUPERVISION OF NORMAL FIRST PREGNANCY IN THIRD TRIMESTER: Primary | ICD-10-CM

## 2022-10-05 DIAGNOSIS — R79.9 ABNORMAL BLOOD CHEMISTRY: ICD-10-CM

## 2022-10-05 LAB
ALBUMIN UR-MCNC: NEGATIVE MG/DL
ALT SERPL W P-5'-P-CCNC: 38 U/L (ref 10–35)
APPEARANCE UR: CLEAR
AST SERPL W P-5'-P-CCNC: 29 U/L (ref 10–35)
BILIRUB UR QL STRIP: NEGATIVE
COLOR UR AUTO: YELLOW
CREAT SERPL-MCNC: 0.38 MG/DL (ref 0.51–0.95)
GFR SERPL CREATININE-BSD FRML MDRD: >90 ML/MIN/1.73M2
GLUCOSE UR STRIP-MCNC: NEGATIVE MG/DL
HGB UR QL STRIP: NEGATIVE
KETONES UR STRIP-MCNC: NEGATIVE MG/DL
LEUKOCYTE ESTERASE UR QL STRIP: ABNORMAL
NITRATE UR QL: NEGATIVE
PH UR STRIP: 8 [PH] (ref 5–8)
SP GR UR STRIP: 1.02 (ref 1–1.03)
UROBILINOGEN UR STRIP-ACNC: 0.2 E.U./DL
WBC # BLD AUTO: 14.9 10E3/UL (ref 4–11)

## 2022-10-05 PROCEDURE — 85048 AUTOMATED LEUKOCYTE COUNT: CPT | Performed by: FAMILY MEDICINE

## 2022-10-05 PROCEDURE — 84460 ALANINE AMINO (ALT) (SGPT): CPT | Performed by: FAMILY MEDICINE

## 2022-10-05 PROCEDURE — 99207 PR PRENATAL VISIT: CPT | Performed by: FAMILY MEDICINE

## 2022-10-05 PROCEDURE — 84450 TRANSFERASE (AST) (SGOT): CPT | Performed by: FAMILY MEDICINE

## 2022-10-05 PROCEDURE — 81003 URINALYSIS AUTO W/O SCOPE: CPT | Performed by: FAMILY MEDICINE

## 2022-10-05 PROCEDURE — 82565 ASSAY OF CREATININE: CPT | Performed by: FAMILY MEDICINE

## 2022-10-05 PROCEDURE — 36415 COLL VENOUS BLD VENIPUNCTURE: CPT | Performed by: FAMILY MEDICINE

## 2022-10-05 NOTE — PROGRESS NOTES
S:  No more heart rate up to 140 and sweating.  BP at home normal now at 115 / 70-80.  Pantoprazole 20 helping with GERD.  No regular contractions.  General unwell feeling has subsided.  Mood is good.  She called insurance and the heart event monitor would not be covered by insurance.    O: Deep tendon reflexes 2+, UA small LE, see flowsheet    A/P: 32-year-old  1 para 0 female at 32-5/7 weeks gestation.  Sinus tachycardia.     Continue to watch blood pressure twice a week.  If it is over 140 on top or over 90 on the bottom please let me know.  Next OB appointment 2 weeks.  Continue to monitor your pulse.  It is normal to get to the 1 teens or 120s in pregnancy but if getting up to the 140s at rest or any associated symptoms such as chest pain, shortness of breath, sweatiness, dizziness then likely a more emergent evaluation.  As long as your pulse is in the normal range for pregnancy up to 120 and regular or so you do not have to do the event monitor.  Today we will repeat some labs that were mildly abnormal last visit.  Will discuss flu shot next visit.  Passed on new Covid shot today.  Next visit will start cervical checks.

## 2022-10-05 NOTE — PATIENT INSTRUCTIONS
Continue to watch blood pressure twice a week.  If it is over 140 on top or over 90 on the bottom please let me know.    Next OB appointment 2 weeks.    Continue to monitor your pulse.  It is normal to get to the 1 teens or 120s in pregnancy but if getting up to the 140s at rest or any associated symptoms such as chest pain, shortness of breath, sweatiness, dizziness then likely a more emergent evaluation.    As long as your pulse is in the normal range for pregnancy up to 120 and regular or so you do not have to do the event monitor.    Today we will repeat some labs that were mildly abnormal last visit.    Will discuss flu shot next visit.    Passed on new Covid shot today.    Next visit will start cervical checks.

## 2022-10-20 ENCOUNTER — PRENATAL OFFICE VISIT (OUTPATIENT)
Dept: FAMILY MEDICINE | Facility: CLINIC | Age: 32
End: 2022-10-20
Payer: COMMERCIAL

## 2022-10-20 VITALS
BODY MASS INDEX: 36.27 KG/M2 | SYSTOLIC BLOOD PRESSURE: 117 MMHG | HEART RATE: 101 BPM | WEIGHT: 245.6 LBS | RESPIRATION RATE: 18 BRPM | DIASTOLIC BLOOD PRESSURE: 79 MMHG

## 2022-10-20 DIAGNOSIS — Z23 NEED FOR PROPHYLACTIC VACCINATION AND INOCULATION AGAINST INFLUENZA: Primary | ICD-10-CM

## 2022-10-20 LAB
ALBUMIN UR-MCNC: NEGATIVE MG/DL
APPEARANCE UR: CLEAR
BILIRUB UR QL STRIP: NEGATIVE
COLOR UR AUTO: YELLOW
GLUCOSE UR STRIP-MCNC: NEGATIVE MG/DL
HGB UR QL STRIP: NEGATIVE
KETONES UR STRIP-MCNC: NEGATIVE MG/DL
LEUKOCYTE ESTERASE UR QL STRIP: ABNORMAL
NITRATE UR QL: NEGATIVE
PH UR STRIP: 7.5 [PH] (ref 5–8)
SP GR UR STRIP: 1.02 (ref 1–1.03)
UROBILINOGEN UR STRIP-ACNC: 0.2 E.U./DL

## 2022-10-20 PROCEDURE — 81003 URINALYSIS AUTO W/O SCOPE: CPT | Performed by: FAMILY MEDICINE

## 2022-10-20 PROCEDURE — 90686 IIV4 VACC NO PRSV 0.5 ML IM: CPT | Performed by: FAMILY MEDICINE

## 2022-10-20 PROCEDURE — 99207 PR PRENATAL VISIT: CPT | Performed by: FAMILY MEDICINE

## 2022-10-20 PROCEDURE — 90471 IMMUNIZATION ADMIN: CPT | Performed by: FAMILY MEDICINE

## 2022-10-20 NOTE — PROGRESS NOTES
S:  Feeling good.  Feel like a side stictch once in a while, more with actitivy. Tries to stay hydrated.  No RUQ abdominal pain.  Tiny bit of swelling in hands.  No more racing heart.  120's at times with activity.  HELLP labs from [facility] visit normal / improving.    O:  DTR 2+, UA-sm LE, see flowsheet    A/P: 32-year-old  1 para 0 female at 34-6/7 weeks gestation.  Tachycardia episodes have improved.  Flu shot today.  I am glad you are racing heart has resolved.  If you are feeling like you are having contractions and they are 3 or more an hour you do want to present to labor and delivery for monitoring.  Urinalysis looks good today.  Follow-up in 2 weeks then weekly.  Cervical check today.  Any nonurgent questions can be sent via [facility] [employer].  Any urgent or labor related questions you call [facility] labor and delivery [phone].  On exam it feels like baby is head down but we will confirm that at next visit with a hand-held ultrasound.  Because your body mass index was just a little just a little over 30 at your first OB appointment we are going to do nonstress test starting weekly at 36 weeks.  Next visit will be the group B strep swab.

## 2022-10-20 NOTE — PATIENT INSTRUCTIONS
Flu shot today.    I am glad you are racing heart has resolved.    If you are feeling like you are having contractions and they are 3 or more an hour you do want to present to labor and delivery for monitoring.    Urinalysis looks good today.    Follow-up in 2 weeks then weekly.    Cervical check today.    Any nonurgent questions can be sent via [employer].    Any urgent or labor related questions you call [facility] labor and delivery [phone].    On exam it feels like baby is head down but we will confirm that at next visit with a hand-held ultrasound.    Because your body mass index was just a little just a little over 30 at your first OB appointment we are going to do nonstress test starting weekly at 36 weeks.    Next visit will be the group B strep swab.

## 2022-11-02 ENCOUNTER — PRENATAL OFFICE VISIT (OUTPATIENT)
Dept: FAMILY MEDICINE | Facility: CLINIC | Age: 32
End: 2022-11-02
Payer: COMMERCIAL

## 2022-11-02 VITALS
HEART RATE: 102 BPM | TEMPERATURE: 95.8 F | DIASTOLIC BLOOD PRESSURE: 85 MMHG | RESPIRATION RATE: 16 BRPM | WEIGHT: 251.38 LBS | BODY MASS INDEX: 37.12 KG/M2 | SYSTOLIC BLOOD PRESSURE: 126 MMHG

## 2022-11-02 DIAGNOSIS — Z34.03 ENCOUNTER FOR SUPERVISION OF NORMAL FIRST PREGNANCY IN THIRD TRIMESTER: Primary | ICD-10-CM

## 2022-11-02 DIAGNOSIS — E66.09 CLASS 1 OBESITY DUE TO EXCESS CALORIES WITHOUT SERIOUS COMORBIDITY IN ADULT, UNSPECIFIED BMI: ICD-10-CM

## 2022-11-02 DIAGNOSIS — E66.811 CLASS 1 OBESITY DUE TO EXCESS CALORIES WITHOUT SERIOUS COMORBIDITY IN ADULT, UNSPECIFIED BMI: ICD-10-CM

## 2022-11-02 LAB
ALBUMIN UR-MCNC: NEGATIVE MG/DL
APPEARANCE UR: ABNORMAL
BILIRUB UR QL STRIP: NEGATIVE
COLOR UR AUTO: YELLOW
GLUCOSE UR STRIP-MCNC: NEGATIVE MG/DL
HGB BLD-MCNC: 11.6 G/DL (ref 11.7–15.7)
HGB UR QL STRIP: NEGATIVE
KETONES UR STRIP-MCNC: NEGATIVE MG/DL
LEUKOCYTE ESTERASE UR QL STRIP: ABNORMAL
NITRATE UR QL: NEGATIVE
PH UR STRIP: 7 [PH] (ref 5–8)
SP GR UR STRIP: 1.01 (ref 1–1.03)
UROBILINOGEN UR STRIP-ACNC: 0.2 E.U./DL

## 2022-11-02 PROCEDURE — 99207 PR PRENATAL VISIT: CPT | Performed by: FAMILY MEDICINE

## 2022-11-02 PROCEDURE — 85018 HEMOGLOBIN: CPT | Performed by: FAMILY MEDICINE

## 2022-11-02 PROCEDURE — 87653 STREP B DNA AMP PROBE: CPT | Performed by: FAMILY MEDICINE

## 2022-11-02 PROCEDURE — 81003 URINALYSIS AUTO W/O SCOPE: CPT | Performed by: FAMILY MEDICINE

## 2022-11-02 PROCEDURE — 36415 COLL VENOUS BLD VENIPUNCTURE: CPT | Performed by: FAMILY MEDICINE

## 2022-11-02 NOTE — PATIENT INSTRUCTIONS
Filled out the family medical leave form for your .    If before 37 weeks pregnant you are having 3 or more contractions an hour please give a call to the hospital.    After 37 weeks pregnant we watch for labor and that would be contractions every 5 to 10 minutes for an hour or longer or if your water breaks with a gush or slow constant trickle.    With body mass index being 30 or over prepregnancy we are getting some extra monitoring.  I am ordering a biophysical profile OB ultrasound with fetal growth.  You can call [phone] to set that up at [facility].    Vertex presentation confirmed with hand-held ultrasound.    Nonstress test today reactive and weekly with your appointments.    Group B strep and hemoglobin today.

## 2022-11-02 NOTE — PROGRESS NOTES
S:  No repetitive contractions.  No more racing heart.  Good fetal movement.  Just a little bit of swelling in her ankles.  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    O:  DTR 2+, cloudy, UA mod LE, see flowsheet    A/P: 32-year-old  1 para 0 female at 36-5/7 weeks gestation.  O+ blood type.  Obese.  Filled out the family medical leave form for your .  If before 37 weeks pregnant you are having 3 or more contractions an hour please give a call to the hospital.  After 37 weeks pregnant we watch for labor and that would be contractions every 5 to 10 minutes for an hour or longer or if your water breaks with a gush or slow constant trickle.  With body mass index being 30 or over prepregnancy we are getting some extra monitoring.  I am ordering a biophysical profile OB ultrasound with fetal growth.  You can call [phone] to set that up at [facility].  Vertex presentation confirmed with hand-held ultrasound.  Nonstress test today reactive and weekly with your appointments.  Group B strep and hemoglobin today.

## 2022-11-03 LAB — GP B STREP DNA SPEC QL NAA+PROBE: NEGATIVE

## 2022-11-07 ENCOUNTER — HOSPITAL ENCOUNTER (OUTPATIENT)
Dept: ULTRASOUND IMAGING | Facility: CLINIC | Age: 32
Discharge: HOME | End: 2022-11-07
Attending: FAMILY MEDICINE | Admitting: FAMILY MEDICINE
Payer: COMMERCIAL

## 2022-11-07 DIAGNOSIS — E66.811 CLASS 1 OBESITY DUE TO EXCESS CALORIES WITHOUT SERIOUS COMORBIDITY IN ADULT, UNSPECIFIED BMI: ICD-10-CM

## 2022-11-07 DIAGNOSIS — E66.09 CLASS 1 OBESITY DUE TO EXCESS CALORIES WITHOUT SERIOUS COMORBIDITY IN ADULT, UNSPECIFIED BMI: ICD-10-CM

## 2022-11-07 PROCEDURE — 76819 FETAL BIOPHYS PROFIL W/O NST: CPT

## 2022-11-10 ENCOUNTER — PRENATAL OFFICE VISIT (OUTPATIENT)
Dept: FAMILY MEDICINE | Facility: CLINIC | Age: 32
End: 2022-11-10
Payer: COMMERCIAL

## 2022-11-10 VITALS
WEIGHT: 250.25 LBS | SYSTOLIC BLOOD PRESSURE: 132 MMHG | BODY MASS INDEX: 36.96 KG/M2 | DIASTOLIC BLOOD PRESSURE: 87 MMHG

## 2022-11-10 DIAGNOSIS — Z34.03 ENCOUNTER FOR SUPERVISION OF NORMAL FIRST PREGNANCY IN THIRD TRIMESTER: Primary | ICD-10-CM

## 2022-11-10 LAB
ALBUMIN UR-MCNC: ABNORMAL MG/DL
APPEARANCE UR: CLEAR
BILIRUB UR QL STRIP: ABNORMAL
COLOR UR AUTO: YELLOW
GLUCOSE UR STRIP-MCNC: NEGATIVE MG/DL
HGB UR QL STRIP: NEGATIVE
KETONES UR STRIP-MCNC: 40 MG/DL
LEUKOCYTE ESTERASE UR QL STRIP: ABNORMAL
NITRATE UR QL: NEGATIVE
PH UR STRIP: 6 [PH] (ref 5–8)
SP GR UR STRIP: >=1.03 (ref 1–1.03)
UROBILINOGEN UR STRIP-ACNC: 0.2 E.U./DL

## 2022-11-10 PROCEDURE — 99207 PR PRENATAL VISIT: CPT | Performed by: FAMILY MEDICINE

## 2022-11-10 PROCEDURE — 81003 URINALYSIS AUTO W/O SCOPE: CPT | Performed by: FAMILY MEDICINE

## 2022-11-10 NOTE — PATIENT INSTRUCTIONS
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    If you are having regular contractions every 5-10 minutes, or your water breaks (with a gush or slow constant trickle), call the hospital.     Check BP at home 2 times a week and let me know if 140/90 or higher.    NST reactive.    BPP US results pending.    Follow-up in 1 week.

## 2022-11-11 NOTE — PROGRESS NOTES
S: Overall feeling good at this stage of pregnancy.  Good fetal movement.  She did feel 1 contraction this morning that woke her up.  It did not return.  Last visit group B strep negative.  Hemoglobin 11.6.  Biophysical profile ultrasound results are pending.  She did check her blood pressure at work and is 120/70.  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  There is a lot of risk for infection there especially COVID.  She is wishing to start her maternity leave starting tomorrow to be able to quarantine at home and stay safe for her delivery.  This is medically recommended.    O:  DTR2+, UA Trace protein, 40 ketone, trace LE, Tr bili, see flowsheet    A/P: 32-year-old  1 para 0 female at 37 6/7 weeks gestation.  BMI 30 at the start of pregnancy.  Surprise gender.  Group B strep negative  Planning [place] for delivery.  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  If you are having regular contractions every 5-10 minutes, or your water breaks (with a gush or slow constant trickle), call the hospital.   Check BP at home 2 times a week and let me know if 140/90 or higher.  NST reactive.  BPP US results pending.  Follow-up in 1 week.

## 2022-11-17 ENCOUNTER — PRENATAL OFFICE VISIT (OUTPATIENT)
Dept: FAMILY MEDICINE | Facility: CLINIC | Age: 32
End: 2022-11-17
Payer: COMMERCIAL

## 2022-11-17 VITALS
HEART RATE: 117 BPM | OXYGEN SATURATION: 99 % | HEIGHT: 69 IN | WEIGHT: 255.13 LBS | BODY MASS INDEX: 37.79 KG/M2 | SYSTOLIC BLOOD PRESSURE: 136 MMHG | RESPIRATION RATE: 16 BRPM | TEMPERATURE: 97.3 F | DIASTOLIC BLOOD PRESSURE: 83 MMHG

## 2022-11-17 DIAGNOSIS — E66.811 CLASS 1 OBESITY DUE TO EXCESS CALORIES WITHOUT SERIOUS COMORBIDITY IN ADULT, UNSPECIFIED BMI: ICD-10-CM

## 2022-11-17 DIAGNOSIS — E66.09 CLASS 1 OBESITY DUE TO EXCESS CALORIES WITHOUT SERIOUS COMORBIDITY IN ADULT, UNSPECIFIED BMI: ICD-10-CM

## 2022-11-17 DIAGNOSIS — Z34.03 ENCOUNTER FOR SUPERVISION OF NORMAL FIRST PREGNANCY IN THIRD TRIMESTER: Primary | ICD-10-CM

## 2022-11-17 LAB
ALBUMIN UR-MCNC: NEGATIVE MG/DL
APPEARANCE UR: CLEAR
BILIRUB UR QL STRIP: NEGATIVE
COLOR UR AUTO: YELLOW
GLUCOSE UR STRIP-MCNC: NEGATIVE MG/DL
HGB UR QL STRIP: NEGATIVE
KETONES UR STRIP-MCNC: NEGATIVE MG/DL
LEUKOCYTE ESTERASE UR QL STRIP: ABNORMAL
NITRATE UR QL: NEGATIVE
PH UR STRIP: 6 [PH] (ref 5–8)
SP GR UR STRIP: 1.01 (ref 1–1.03)
UROBILINOGEN UR STRIP-ACNC: 0.2 E.U./DL

## 2022-11-17 PROCEDURE — 99207 PR PRENATAL VISIT: CPT | Performed by: FAMILY MEDICINE

## 2022-11-17 PROCEDURE — 59025 FETAL NON-STRESS TEST: CPT | Performed by: FAMILY MEDICINE

## 2022-11-17 PROCEDURE — 81003 URINALYSIS AUTO W/O SCOPE: CPT | Performed by: FAMILY MEDICINE

## 2022-11-17 NOTE — PROGRESS NOTES
S:  Tired.  No regular contractions.  Home blood pressures 1 12-1 17 over 60s.  Group B strep swab negative and recent hemoglobin 11.6.  She would not like membranes stripped today but would consider it at her next visit.    O:  DTR 2+, UA-sm Le, see flowsheet    A/P: 32-year-old  1 para 0 female at 30-6/7 weeks gestation.  O+ blood type.  Negative group B strep.  Obese.  Surprise gender.  If you are having regular contractions every 5-10 minutes, or your water breaks (with a gush or slow constant trickle), call the hospital.   Nonstress test reactive today.  Follow-up planned in 4 days.  We will repeat the nonstress test then.  Next visit if you wish I can strip your membranes.  Next visit we will discuss postdates management if you do make it to your due date.  Then we can discuss a 41-week induction if needed or sooner if there is any concerns or complications.  Did discuss pain control options.  At this point patient would like to try labor without medications but is open to pain meds if needed.  Would like a circumcision of having a boy.  Discussed baby meds and they are agreeable.

## 2022-11-17 NOTE — PATIENT INSTRUCTIONS
If you are having regular contractions every 5-10 minutes, or your water breaks (with a gush or slow constant trickle), call the hospital.     Nonstress test reactive today.    Follow-up planned in 4 days.  We will repeat the nonstress test then.    Next visit if you wish I can strip your membranes.    Next visit we will discuss postdates management if you do make it to your due date.    Then we can discuss a 41-week induction if needed or sooner if there is any concerns or complications.    Did discuss pain control options.  At this point patient would like to try labor without medications but is open to pain meds if needed.    Would like a circumcision of having a boy.    Discussed baby meds and they are agreeable.

## 2022-11-21 ENCOUNTER — PRENATAL OFFICE VISIT (OUTPATIENT)
Dept: FAMILY MEDICINE | Facility: CLINIC | Age: 32
End: 2022-11-21
Payer: COMMERCIAL

## 2022-11-21 VITALS
OXYGEN SATURATION: 99 % | WEIGHT: 255.38 LBS | HEIGHT: 69 IN | RESPIRATION RATE: 18 BRPM | DIASTOLIC BLOOD PRESSURE: 86 MMHG | SYSTOLIC BLOOD PRESSURE: 125 MMHG | HEART RATE: 107 BPM | TEMPERATURE: 96.1 F | BODY MASS INDEX: 37.83 KG/M2

## 2022-11-21 DIAGNOSIS — Z34.03 ENCOUNTER FOR SUPERVISION OF NORMAL FIRST PREGNANCY IN THIRD TRIMESTER: Primary | ICD-10-CM

## 2022-11-21 DIAGNOSIS — E66.09 CLASS 1 OBESITY DUE TO EXCESS CALORIES WITHOUT SERIOUS COMORBIDITY IN ADULT, UNSPECIFIED BMI: ICD-10-CM

## 2022-11-21 DIAGNOSIS — E66.811 CLASS 1 OBESITY DUE TO EXCESS CALORIES WITHOUT SERIOUS COMORBIDITY IN ADULT, UNSPECIFIED BMI: ICD-10-CM

## 2022-11-21 DIAGNOSIS — O48.0 POST-TERM PREGNANCY, 40-42 WEEKS OF GESTATION: ICD-10-CM

## 2022-11-21 PROCEDURE — 59025 FETAL NON-STRESS TEST: CPT | Performed by: FAMILY MEDICINE

## 2022-11-21 PROCEDURE — 99207 PR PRENATAL VISIT: CPT | Mod: 25 | Performed by: FAMILY MEDICINE

## 2022-11-21 PROCEDURE — 81003 URINALYSIS AUTO W/O SCOPE: CPT | Performed by: FAMILY MEDICINE

## 2022-11-21 NOTE — PROGRESS NOTES
S:  Feels tired and end of the day back hurts. No pain down legs or weakness of legs.  Off work from ER.  Good fetal movement.  No regular contractions.  No lower extremity edema.    O:  DTR2+, UA sm LE, see flowsheet, Nichols 8, cervix 2 cm, 60%, -1 station, mid position, medium consistency    A/P: 32-year-old  1 para 0 female at 39-3/7 weeks gestation.  O+ blood type.  Obese.  Surprise gender.  In therapy for depression which is stable.  Alcohol abuse in remission.  We are arranging a biophysical profile and nonstress test at [facility] on  since you will then be post dates.  If biophysical profile does not look good she may need an induction.  I discussed with the on-call providers Dr. Maritza Fraga and Dr. Orona about this as I am unavailable from  through the evening of .  If you are noticing contractions every 5 to 10 minutes or your water breaks with a gush or slow constant trickle please call [facility] labor and delivery.  If you have not delivered we will be seeing each other in 1 week and we will discuss further postdates management/induction.  Since your cervix is ready but we do want to give you some more time to go into a natural labor we will tentatively set up an induction for .  Call at 6 am and arrive at 7 am.  This was set up after the patient's visit so we will discuss it with her next week if not delivered.

## 2022-11-21 NOTE — PATIENT INSTRUCTIONS
We are arranging a biophysical profile and nonstress test at [facility] on November 25.    If you are noticing contractions every 5 to 10 minutes or your water breaks with a gush or slow constant trickle please call [facility] labor and delivery.    If you have not delivered we will be seeing each other in 1 week and we will discuss further postdates management/induction.    Since your cervix is ready but we do want to give you some more time to go into a natural labor we will tentatively set up an induction for Wednesday, November 30.

## 2022-11-25 ENCOUNTER — HOSPITAL ENCOUNTER (OUTPATIENT)
Facility: CLINIC | Age: 32
Discharge: HOME | End: 2022-11-25
Attending: OBSTETRICS & GYNECOLOGY | Admitting: FAMILY MEDICINE
Payer: COMMERCIAL

## 2022-11-25 ENCOUNTER — HOSPITAL ENCOUNTER (OUTPATIENT)
Dept: ULTRASOUND IMAGING | Facility: CLINIC | Age: 32
Discharge: HOME | End: 2022-11-25
Attending: FAMILY MEDICINE | Admitting: FAMILY MEDICINE
Payer: COMMERCIAL

## 2022-11-25 ENCOUNTER — HOSPITAL ENCOUNTER (OUTPATIENT)
Facility: CLINIC | Age: 32
End: 2022-11-25
Admitting: FAMILY MEDICINE
Payer: COMMERCIAL

## 2022-11-25 VITALS — BODY MASS INDEX: 37.03 KG/M2 | WEIGHT: 250 LBS | HEIGHT: 69 IN

## 2022-11-25 DIAGNOSIS — O48.0 POST-TERM PREGNANCY, 40-42 WEEKS OF GESTATION: ICD-10-CM

## 2022-11-25 PROCEDURE — G0463 HOSPITAL OUTPT CLINIC VISIT: HCPCS

## 2022-11-25 PROCEDURE — 76819 FETAL BIOPHYS PROFIL W/O NST: CPT

## 2022-11-25 RX ORDER — LIDOCAINE 40 MG/G
CREAM TOPICAL
Status: DISCONTINUED | OUTPATIENT
Start: 2022-11-25 | End: 2022-11-25 | Stop reason: HOSPADM

## 2022-11-25 NOTE — PLAN OF CARE
Patient here for her NST and Bpp due to she is 40 weeks today.  Patient is coping well. Vitals are normal.  Will make plan with Dr. Fraga after NST and BPP are done. Patient is good with plan

## 2022-11-28 ENCOUNTER — PRENATAL OFFICE VISIT (OUTPATIENT)
Dept: FAMILY MEDICINE | Facility: CLINIC | Age: 32
End: 2022-11-28
Payer: COMMERCIAL

## 2022-11-28 VITALS
RESPIRATION RATE: 16 BRPM | HEIGHT: 69 IN | SYSTOLIC BLOOD PRESSURE: 115 MMHG | WEIGHT: 258.38 LBS | TEMPERATURE: 97 F | HEART RATE: 122 BPM | BODY MASS INDEX: 38.27 KG/M2 | DIASTOLIC BLOOD PRESSURE: 82 MMHG | OXYGEN SATURATION: 98 %

## 2022-11-28 DIAGNOSIS — E66.09 CLASS 1 OBESITY DUE TO EXCESS CALORIES WITHOUT SERIOUS COMORBIDITY IN ADULT, UNSPECIFIED BMI: ICD-10-CM

## 2022-11-28 DIAGNOSIS — E66.811 CLASS 1 OBESITY DUE TO EXCESS CALORIES WITHOUT SERIOUS COMORBIDITY IN ADULT, UNSPECIFIED BMI: ICD-10-CM

## 2022-11-28 DIAGNOSIS — Z34.03 ENCOUNTER FOR SUPERVISION OF NORMAL FIRST PREGNANCY IN THIRD TRIMESTER: Primary | ICD-10-CM

## 2022-11-28 LAB
ALBUMIN UR-MCNC: NEGATIVE MG/DL
APPEARANCE UR: CLEAR
BILIRUB UR QL STRIP: NEGATIVE
COLOR UR AUTO: YELLOW
GLUCOSE UR STRIP-MCNC: NEGATIVE MG/DL
HGB UR QL STRIP: NEGATIVE
KETONES UR STRIP-MCNC: NEGATIVE MG/DL
LEUKOCYTE ESTERASE UR QL STRIP: ABNORMAL
NITRATE UR QL: NEGATIVE
PH UR STRIP: 7 [PH] (ref 5–8)
SP GR UR STRIP: 1.02 (ref 1–1.03)
UROBILINOGEN UR STRIP-ACNC: 0.2 E.U./DL

## 2022-11-28 PROCEDURE — U0005 INFEC AGEN DETEC AMPLI PROBE: HCPCS | Performed by: FAMILY MEDICINE

## 2022-11-28 PROCEDURE — 99207 PR PRENATAL VISIT: CPT | Performed by: FAMILY MEDICINE

## 2022-11-28 PROCEDURE — U0003 INFECTIOUS AGENT DETECTION BY NUCLEIC ACID (DNA OR RNA); SEVERE ACUTE RESPIRATORY SYNDROME CORONAVIRUS 2 (SARS-COV-2) (CORONAVIRUS DISEASE [COVID-19]), AMPLIFIED PROBE TECHNIQUE, MAKING USE OF HIGH THROUGHPUT TECHNOLOGIES AS DESCRIBED BY CMS-2020-01-R: HCPCS | Performed by: FAMILY MEDICINE

## 2022-11-28 PROCEDURE — 81003 URINALYSIS AUTO W/O SCOPE: CPT | Performed by: FAMILY MEDICINE

## 2022-11-28 NOTE — PATIENT INSTRUCTIONS
Induction for Wednesday, November 30.  Call at 6 am and plan to arrive at 7 am.    Phone number for [facility] [phone].    COVID swab today.    If you are noticing contractions every 5 to 10 minutes or your water breaks with a gush or slow constant trickle please call [facility] labor and delivery.

## 2022-11-28 NOTE — PROGRESS NOTES
S:  Occasional low cramping, not regular.  BPP 8/8 and NST reactive on 22.      O: Deep tendon reflexes 2+, UA-sm LE, cervix 2 to 3 cm, 70%, 0 station, mid position, medium consistency, Nichols score 8    A/P: 32-year-old  1 para 0 female at 40-3/7 weeks gestation.  O+ blood type.  Group B strep negative.  Depression, stable.  Past history of alcohol use, resolved.  Surprise gender.  Induction for .  Call at 6 am and plan to arrive at 7 am.  Phone number [facility] [phone].  COVID swab today.  If you are noticing contractions every 5 to 10 minutes or your water breaks with a gush or slow constant trickle please call [facility] delivery.

## 2022-11-29 LAB — SARS-COV-2 RNA RESP QL NAA+PROBE: NEGATIVE

## 2022-11-30 PROBLEM — Z34.90 PREGNANCY: Status: ACTIVE | Noted: 2022-11-30

## 2022-12-01 ENCOUNTER — ANESTHESIA (OUTPATIENT)
Dept: OBGYN | Facility: CLINIC | Age: 32
End: 2022-12-01
Payer: COMMERCIAL

## 2022-12-01 ENCOUNTER — ANESTHESIA EVENT (OUTPATIENT)
Dept: OBGYN | Facility: CLINIC | Age: 32
End: 2022-12-01
Payer: COMMERCIAL

## 2022-12-01 ENCOUNTER — MEDICAL CORRESPONDENCE (OUTPATIENT)
Dept: HEALTH INFORMATION MANAGEMENT | Facility: CLINIC | Age: 32
End: 2022-12-01

## 2022-12-01 PROCEDURE — 370N000003 HC ANESTHESIA WARD SERVICE

## 2022-12-01 PROCEDURE — 250N000009 HC RX 250: Performed by: ANESTHESIOLOGY

## 2022-12-01 PROCEDURE — 250N000011 HC RX IP 250 OP 636: Performed by: ANESTHESIOLOGY

## 2022-12-01 RX ORDER — LIDOCAINE HYDROCHLORIDE AND EPINEPHRINE 15; 5 MG/ML; UG/ML
INJECTION, SOLUTION EPIDURAL PRN
Status: DISCONTINUED | OUTPATIENT
Start: 2022-12-01 | End: 2022-12-02

## 2022-12-01 RX ORDER — BUPIVACAINE HYDROCHLORIDE 2.5 MG/ML
INJECTION, SOLUTION EPIDURAL; INFILTRATION; INTRACAUDAL
Status: COMPLETED | OUTPATIENT
Start: 2022-12-01 | End: 2022-12-01

## 2022-12-01 RX ADMIN — LIDOCAINE HYDROCHLORIDE,EPINEPHRINE BITARTRATE 5 ML: 15; .005 INJECTION, SOLUTION EPIDURAL; INFILTRATION; INTRACAUDAL; PERINEURAL at 18:13

## 2022-12-01 RX ADMIN — BUPIVACAINE HYDROCHLORIDE 5 ML: 2.5 INJECTION, SOLUTION EPIDURAL; INFILTRATION; INTRACAUDAL at 18:00

## 2022-12-01 RX ADMIN — BUPIVACAINE HYDROCHLORIDE 5 ML: 2.5 INJECTION, SOLUTION EPIDURAL; INFILTRATION; INTRACAUDAL at 18:18

## 2022-12-02 NOTE — ANESTHESIA POSTPROCEDURE EVALUATION
Patient: Abimbola Monreal    Procedure: * No procedures listed *  Induction    Anesthesia Type:  No value filed.    Note:  Disposition: Inpatient   Postop Pain Control: Uneventful            Sign Out: Well controlled pain   PONV: No   Neuro/Psych: Uneventful            Sign Out: Acceptable/Baseline neuro status   Airway/Respiratory: Uneventful            Sign Out: Acceptable/Baseline resp. status   CV/Hemodynamics: Uneventful            Sign Out: Acceptable CV status; No obvious hypovolemia; No obvious fluid overload   Other NRE: NONE   DID A NON-ROUTINE EVENT OCCUR? No           Last vitals:  Vitals:    12/02/22 0225 12/02/22 0240 12/02/22 0250   BP: 109/64 126/65 115/62   Pulse:      Resp:      Temp:      SpO2:          Electronically Signed By: Olu Carrillo MD  December 2, 2022  6:46 AM
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
## 2022-12-02 NOTE — ANESTHESIA PROCEDURE NOTES
"Epidural catheter Procedure Note    Pre-Procedure   Staff -        Anesthesiologist:  Olu Carrillo MD       Performed By: anesthesiologist       Location: OB       Procedure Start/Stop Times: 12/1/2022 6:03 PM and 12/1/2022 6:26 PM       Pre-Anesthestic Checklist: patient identified, IV checked, risks and benefits discussed, informed consent, monitors and equipment checked, pre-op evaluation, at physician/surgeon's request and post-op pain management  Timeout:       Correct Patient: Yes        Correct Procedure: Yes        Correct Site: Yes        Correct Position: Yes   Procedure Documentation  Procedure: epidural catheter       Patient Position: sitting       Patient Prep/Sterile Barriers: sterile gloves, patient draped       Skin prep: Chloraprep       Local skin infiltrated with mL of 1% lidocaine.        Insertion Site: L3-4. (midline approach).       Technique: LORT saline        Needle Type: [employer]       Needle Gauge: 18.        Needle Length (Inches): 3.5        Catheter: 20 G.          Catheter threaded easily.             # of attempts: 1 and  # of redirects:     Assessment/Narrative         Paresthesias: No.       Test dose of 5 mL lidocaine 1.5% w/ 1:200,000 epinephrine at.         Test dose negative, 3 minutes after injection, for signs of intravascular, subdural, or intrathecal injection.       Insertion/Infusion Method: LORT saline       Aspiration negative for Heme or CSF via Epidural Catheter.    Medication(s) Administered   0.25% Bupivacaine PF (Epidural) - EPIDURAL   5 mL - 12/1/2022 6:00:00 PM  Medication Administration Time: 12/1/2022 6:03 PM      FOR [facility] ([facility]) ONLY:   Pain Team Contact information: please page the Pain Team Via [employer]. Search \"Pain\". During daytime hours, please page the attending first. At night please page the resident first.    "

## 2022-12-12 ENCOUNTER — MEDICAL CORRESPONDENCE (OUTPATIENT)
Dept: HEALTH INFORMATION MANAGEMENT | Facility: CLINIC | Age: 32
End: 2022-12-12

## 2023-01-11 ENCOUNTER — TELEPHONE (OUTPATIENT)
Dept: FAMILY MEDICINE | Facility: CLINIC | Age: 33
End: 2023-01-11

## 2023-01-11 NOTE — TELEPHONE ENCOUNTER
ARELISUM faxed in short term disability forms to be filled out. I put in out guide and put on Vignesh's desk.

## 2023-01-16 ENCOUNTER — MEDICAL CORRESPONDENCE (OUTPATIENT)
Dept: HEALTH INFORMATION MANAGEMENT | Facility: CLINIC | Age: 33
End: 2023-01-16

## 2023-01-16 ENCOUNTER — PRENATAL OFFICE VISIT (OUTPATIENT)
Dept: FAMILY MEDICINE | Facility: CLINIC | Age: 33
End: 2023-01-16
Payer: COMMERCIAL

## 2023-01-16 VITALS
OXYGEN SATURATION: 98 % | TEMPERATURE: 97.6 F | DIASTOLIC BLOOD PRESSURE: 76 MMHG | RESPIRATION RATE: 16 BRPM | BODY MASS INDEX: 36.2 KG/M2 | HEART RATE: 83 BPM | WEIGHT: 245.13 LBS | SYSTOLIC BLOOD PRESSURE: 102 MMHG

## 2023-01-16 DIAGNOSIS — Z23 HIGH PRIORITY FOR 2019-NCOV VACCINE: ICD-10-CM

## 2023-01-16 DIAGNOSIS — D50.8 OTHER IRON DEFICIENCY ANEMIA: ICD-10-CM

## 2023-01-16 DIAGNOSIS — F10.21 ALCOHOL DEPENDENCE IN REMISSION (H): ICD-10-CM

## 2023-01-16 LAB — HGB BLD-MCNC: 13 G/DL (ref 11.7–15.7)

## 2023-01-16 PROCEDURE — 0124A COVID-19 VACCINE BIVALENT BOOSTER 12+ (PFIZER): CPT | Performed by: FAMILY MEDICINE

## 2023-01-16 PROCEDURE — 85018 HEMOGLOBIN: CPT | Performed by: FAMILY MEDICINE

## 2023-01-16 PROCEDURE — 36415 COLL VENOUS BLD VENIPUNCTURE: CPT | Performed by: FAMILY MEDICINE

## 2023-01-16 PROCEDURE — 99207 PR POST PARTUM EXAM: CPT | Performed by: FAMILY MEDICINE

## 2023-01-16 PROCEDURE — 91312 COVID-19 VACCINE BIVALENT BOOSTER 12+ (PFIZER): CPT | Performed by: FAMILY MEDICINE

## 2023-01-16 NOTE — PATIENT INSTRUCTIONS
Stay on the prenatal vitamin as long as you are breast-feeding and longer if you are thinking about conceiving down the road.    Condoms for now for birth control but if you wish a birth control prescription I can prescribe a progesterone only pill that is safe in breast-feeding.    Covid shot today.

## 2023-01-16 NOTE — PROGRESS NOTES
Abimbola is here for a 6-week postpartum checkup.    ASSESSMENT:   Normal postpartum exam after .    PLAN:  Return as needed or at time of next expected pap, pelvic, or breast exam.    Stay on the prenatal vitamin as long as you are breast-feeding and longer if you are thinking about conceiving down the road.    Condoms for now for birth control but if you wish a birth control prescription I can prescribe a progesterone only pill that is safe in breast-feeding.    Covid shot today.    She had a Pap May 2022 so that is not due.    She is not drinking alcohol and her mood is good.    Delivery date was 22. She had a  of a viable girl, weight 7 pounds 13 oz., with postpartum hemorrhage treated with Cytotec and manual removal of placenta.  She was given IV antibiotics as a preventative for infection after. Since delivery, she has been breast feeding.  She has no signs of infection, bleeding or other complications.  She is not pregnant.  We discussed contraceptions and she has chosen condoms.  She  has not had intercourse since delivery and complains of No discomfort.    Had been on birth control for 15 years before conceiving.  She did not do well going off birth control.  Will use condoms at this point.    Postpartum hemorrhage / anemia:  Hgb 9.3 in hospital.  She did take iron through last week.  She is having some fatigue but not out of the ordinary for having a .  No lightheadedness, chest pain or shortness of breath.    EXAM:  ABDOMEN: soft, nontender, without hepatosplenomegaly or masses  BREAST: normal without masses, tenderness or nipple discharge and no palpable axillary masses or adenopathy  PELVIC: external genitalia normal

## 2023-02-01 ENCOUNTER — MEDICAL CORRESPONDENCE (OUTPATIENT)
Dept: HEALTH INFORMATION MANAGEMENT | Facility: CLINIC | Age: 33
End: 2023-02-01

## 2023-03-24 ENCOUNTER — MYC MEDICAL ADVICE (OUTPATIENT)
Dept: FAMILY MEDICINE | Facility: CLINIC | Age: 33
End: 2023-03-24
Payer: COMMERCIAL

## 2023-03-24 DIAGNOSIS — Z78.9 BREASTFEEDING (INFANT): Primary | ICD-10-CM

## 2023-03-24 NOTE — TELEPHONE ENCOUNTER
Dr. Sood,    I pended a new breast pump order here. Please associate a diagnosis if this is the correct order.    Eder Flores RN     [facility]

## 2023-04-10 ENCOUNTER — MEDICAL CORRESPONDENCE (OUTPATIENT)
Dept: HEALTH INFORMATION MANAGEMENT | Facility: CLINIC | Age: 33
End: 2023-04-10
Payer: COMMERCIAL

## 2023-04-17 NOTE — PLAN OF CARE
BEHAVIORAL TEAM DISCUSSION     Participants: Danielle Burks NP, Kathy Quevedo LSW,  Odalis Ray LSW, Gatito Noguera LSW, Ai Cohen RN, Jana Escobedo RN, Rosaura Huang Rec therapy, Sania Hand OT, Regine Jones OT, [facility]   Progress: good  Continued Stay Criteria/Rationale: depression improving   Medical/Physical: none known at this time   Precautions:   Falls precaution?: No          Behavioral Orders   Procedures    Code 1 - Restrict to Unit    Routine Programming       As clinically indicated    Status 15       Every 15 minutes.      Plan: probable discharge tomorrow home with mom  Rationale for change in precautions or plan: None     Active Problems:    Suicidal ideation     Current Facility-Administered Medications:     acetaminophen (TYLENOL) tablet 650 mg, 650 mg, Oral, Q4H PRN, Jose L Pintoa Yumi, APRN CNP, 650 mg at 10/25/19 1641    cloNIDine (CATAPRES) tablet 0.1 mg, 0.1 mg, Oral, BID PRN, WoSania robles APRN CNP, 0.1 mg at 10/27/19 0815    diazepam (VALIUM) tablet 10 mg, 10 mg, Oral, Q6H PRN, Woelel Sania Yumi, APRN CNP, 10 mg at 10/26/19 1622    FLUoxetine (PROzac) capsule 60 mg, 60 mg, Oral, Daily, Woelpern, Sania Yumi, APRN CNP, 60 mg at 10/29/19 0825    folic acid (FOLVITE) tablet 1 mg, 1 mg, Oral, Daily, WoSania robles APRN CNP, 1 mg at 10/29/19 0825    hydrOXYzine (ATARAX) tablet 25-50 mg, 25-50 mg, Oral, Q4H PRN, Woelpertomi Sania Yumi, APRN CNP, 50 mg at 10/27/19 0645    influenza quadrivalent (PF) vacc (FLUZONE) injection 0.5 mL, 0.5 mL, Intramuscular, Prior to discharge, Sania Pinto APRN CNP    lamoTRIgine (LaMICtal) tablet 25 mg, 25 mg, Oral, Daily, Bernie Hernandez, NP, 25 mg at 10/29/19 0826    magnesium hydroxide (MILK OF MAGNESIA) suspension 30 mL, 30 mL, Oral, At Bedtime PRN, Sania Pinto APRN CNP    melatonin 3 mg (with vit B6 10 mg) extended release tablet 1 tablet, 1 tablet, Oral, At Bedtime PRTOMI, Sania Pinto APRN CNP,  Case Management Discharge Note      Final Note: Home with [facility]    Provided Post Acute Provider List?: Yes  Post Acute Provider List: Home Health  Delivered To: Patient  Method of Delivery: Telephone    Selected Continued Care - Discharged on 4/14/2023 Admission date: 4/13/2023 - Discharge disposition: Home or Self Care             Durable Medical Equipment Coordination complete.    Service Provider Selected Services Address Phone Fax Patient Preferred    [facility] [address], [place] [id number] [phone] --                   Home Medical Care Coordination complete.    Service Provider Selected Services Address Phone Fax Patient Preferred    [facility] [address] [facility] [address] [phone] --                  Transportation Services  Private: Car    Final Discharge Disposition Code: 06 - home with home health care   1 tablet at 10/28/19 2236    multivitamin w/minerals (THERA-VIT-M) tablet 1 tablet, 1 tablet, Oral, Daily, Sania Pinto APRN CNP, 1 tablet at 10/29/19 0826    nicotine (NICORETTE) gum 2-4 mg, 2-4 mg, Buccal, Q1H PRN, Sania Pinto APRN CNP    OLANZapine (zyPREXA) tablet 10 mg, 10 mg, Oral, Q8H PRN **OR** OLANZapine (zyPREXA) injection 10 mg, 10 mg, Intramuscular, Q8H PRN, Sania Pinto APRN CNP    ondansetron (ZOFRAN) tablet 4 mg, 4 mg, Oral, Q6H PRN, Sania Pinto APRN CNP, 4 mg at 10/23/19 1159    traZODone (DESYREL) tablet 50 mg, 50 mg, Oral, At Bedtime PRN, Sania Pinto APRN CNP, 50 mg at 10/26/19 9772

## 2023-06-01 ENCOUNTER — HEALTH MAINTENANCE LETTER (OUTPATIENT)
Age: 33
End: 2023-06-01

## 2023-06-01 ENCOUNTER — MEDICAL CORRESPONDENCE (OUTPATIENT)
Dept: HEALTH INFORMATION MANAGEMENT | Facility: CLINIC | Age: 33
End: 2023-06-01
Payer: COMMERCIAL

## 2023-10-05 ENCOUNTER — IMMUNIZATION (OUTPATIENT)
Dept: FAMILY MEDICINE | Facility: CLINIC | Age: 33
End: 2023-10-05
Payer: COMMERCIAL

## 2023-10-05 PROCEDURE — 90686 IIV4 VACC NO PRSV 0.5 ML IM: CPT

## 2023-10-05 PROCEDURE — 90471 IMMUNIZATION ADMIN: CPT

## 2023-12-07 ENCOUNTER — HOSPITAL ENCOUNTER (OUTPATIENT)
Dept: ULTRASOUND IMAGING | Facility: HOSPITAL | Age: 33
Discharge: HOME | End: 2023-12-07
Attending: FAMILY MEDICINE | Admitting: FAMILY MEDICINE
Payer: COMMERCIAL

## 2023-12-07 ENCOUNTER — OFFICE VISIT (OUTPATIENT)
Dept: FAMILY MEDICINE | Facility: CLINIC | Age: 33
End: 2023-12-07
Payer: COMMERCIAL

## 2023-12-07 VITALS
WEIGHT: 219.8 LBS | HEART RATE: 82 BPM | BODY MASS INDEX: 32.46 KG/M2 | TEMPERATURE: 98.7 F | RESPIRATION RATE: 18 BRPM | DIASTOLIC BLOOD PRESSURE: 71 MMHG | SYSTOLIC BLOOD PRESSURE: 112 MMHG | OXYGEN SATURATION: 97 %

## 2023-12-07 DIAGNOSIS — N92.6 MISSED MENSES: Primary | ICD-10-CM

## 2023-12-07 DIAGNOSIS — Z3A.01 LESS THAN 8 WEEKS GESTATION OF PREGNANCY: ICD-10-CM

## 2023-12-07 DIAGNOSIS — Z13.88 SCREENING FOR LEAD EXPOSURE: ICD-10-CM

## 2023-12-07 DIAGNOSIS — N92.6 MISSED MENSES: ICD-10-CM

## 2023-12-07 LAB — HCG UR QL: POSITIVE

## 2023-12-07 PROCEDURE — 99213 OFFICE O/P EST LOW 20 MIN: CPT | Performed by: FAMILY MEDICINE

## 2023-12-07 PROCEDURE — 81025 URINE PREGNANCY TEST: CPT | Performed by: FAMILY MEDICINE

## 2023-12-07 PROCEDURE — 36415 COLL VENOUS BLD VENIPUNCTURE: CPT | Performed by: FAMILY MEDICINE

## 2023-12-07 PROCEDURE — 99000 SPECIMEN HANDLING OFFICE-LAB: CPT | Performed by: FAMILY MEDICINE

## 2023-12-07 PROCEDURE — 83655 ASSAY OF LEAD: CPT | Mod: 90 | Performed by: FAMILY MEDICINE

## 2023-12-07 PROCEDURE — 76801 OB US < 14 WKS SINGLE FETUS: CPT

## 2023-12-07 RX ORDER — PROMETHAZINE HYDROCHLORIDE 25 MG/1
25 TABLET ORAL EVERY 6 HOURS PRN
Qty: 30 TABLET | Refills: 3 | Status: SHIPPED | OUTPATIENT
Start: 2023-12-07 | End: 2024-06-10

## 2023-12-07 NOTE — PROGRESS NOTES
Assessment:   Pregnancy confirmation    Plan:    Counseled patient on early pregnancy issues and plans for continued pregnancy:  - OTC meds safe in early pregnancy,  - importance of prenatal vitamins, and routine activities without restrictions.    - importance of ETOH abstinence and no other drug use  Informed of signs and symptoms of miscarriage and to call with questions of spotting/bleeding management and possible need to come in for evaluation before routine visit at 10-12 weeks.   Set 40 minute visit at 12 weeks pregnant.  Discussed information in the OB packet.  Will let us know if early genetic testing wanted.  Can call for script of Phenergan or Reglan if needed for nausea.  Early ultrasound ordered.  20 minutes spent on the day of encounter doing chart review, history and exam, documentation, and further activities as noted.     Congratulation on your pregnancy!    Today we will check a lead level.    With pregnancy and breast-feeding we want 2600 elton a day.    Exercise is safe in pregnancy.  If you are not a runner do not start a running program in pregnancy.  No jumping into a swimming pool or water slides or Jacuzzi type hot tubs.  Stretch out well before exercise.  Stay hydrated.  We can give you the COVID shot at your first OB visit around 12 weeks pregnant.    No lifting over 20 lbs.    Glad you are on the prenatal vitamin, fish oil and vitamin D3.  Probably would not recommend the sunflower oil.    Prescribed Phenergan to use up to every 6 hours as needed for nausea.     Ordered OB ultrasound.  Can call [phone] to set that up.    chief complaint     HPI: 33 year old year old female come in  today for a pregnancy confirmation. LMP was spotting Sept. Positive test at home 11-13-23 . This would make her unknown weeks pregnant with EDC unknown. Not having any spotting. She is having nausea.  Had vomiting for 2 weeks and intermittent.   They live [place] and told some houses have lead pipes and would  like her lead tested.  They do not think their house is affected but like to be checked.    Objective:  /71 (BP Location: Left arm, Patient Position: Sitting, Cuff Size: Adult Regular)   Pulse 82   Temp 98.7  F (37.1  C) (Oral)   Resp 18   Wt 99.7 kg (219 lb 12.8 oz)   SpO2 97%   BMI 32.46 kg/m     General: No apparent distress  UPT: Positive                     Answers submitted by the patient for this visit:  Patient Health Questionnaire (Submitted on 12/7/2023)  If you checked off any problems, how difficult have these problems made it for you to do your work, take care of things at home, or get along with other people?: Not difficult at all  PHQ9 TOTAL SCORE: 0  General Questionnaire (Submitted on 12/4/2023)  Chief Complaint: Chronic problems general questions HPI Form  What is the reason for your visit today? : New pregnancy  How many servings of fruits and vegetables do you eat daily?: 4 or more  On average, how many sweetened beverages do you drink each day (Examples: soda, juice, sweet tea, etc.  Do NOT count diet or artificially sweetened beverages)?: 0  How many minutes a day do you exercise enough to make your heart beat faster?: 30 to 60  How many days a week do you exercise enough to make your heart beat faster?: 4  How many days per week do you miss taking your medication?: 0

## 2023-12-07 NOTE — PATIENT INSTRUCTIONS
Congratulation on your pregnancy!    Today we will check a lead level.    With pregnancy and breast-feeding we want 2600 elton a day.    Exercise is safe in pregnancy.  If you are not a runner do not start a running program in pregnancy.  No jumping into a swimming pool or water slides or Jacuzzi type hot tubs.  Stretch out well before exercise.  Stay hydrated.  We can give you the COVID shot at your first OB visit around 12 weeks pregnant.    No lifting over 20 lbs.    Glad you are on the prenatal vitamin, fish oil and vitamin D3.  Probably would not recommend the sunflower oil.    Prescribed Phenergan to use up to every 6 hours as needed for nausea.     Ordered OB ultrasound.  Can call [phone] to set that up.

## 2023-12-08 LAB — LEAD BLDV-MCNC: <2 UG/DL

## 2024-01-02 LAB
ABO/RH(D): NORMAL
ANTIBODY SCREEN: NEGATIVE
SPECIMEN EXPIRATION DATE: NORMAL

## 2024-01-03 ENCOUNTER — PRENATAL OFFICE VISIT (OUTPATIENT)
Dept: FAMILY MEDICINE | Facility: CLINIC | Age: 34
End: 2024-01-03
Payer: COMMERCIAL

## 2024-01-03 VITALS
HEART RATE: 91 BPM | SYSTOLIC BLOOD PRESSURE: 117 MMHG | RESPIRATION RATE: 18 BRPM | OXYGEN SATURATION: 97 % | TEMPERATURE: 98.1 F | DIASTOLIC BLOOD PRESSURE: 74 MMHG | BODY MASS INDEX: 32.37 KG/M2 | WEIGHT: 219.2 LBS

## 2024-01-03 DIAGNOSIS — B96.89 BV (BACTERIAL VAGINOSIS): Primary | ICD-10-CM

## 2024-01-03 DIAGNOSIS — Z34.81 ENCOUNTER FOR SUPERVISION OF OTHER NORMAL PREGNANCY IN FIRST TRIMESTER: Primary | ICD-10-CM

## 2024-01-03 DIAGNOSIS — N76.0 BV (BACTERIAL VAGINOSIS): Primary | ICD-10-CM

## 2024-01-03 PROBLEM — F10.21 ALCOHOL DEPENDENCE IN REMISSION (H): Status: RESOLVED | Noted: 2019-11-21 | Resolved: 2024-01-03

## 2024-01-03 LAB
ALBUMIN UR-MCNC: NEGATIVE MG/DL
APPEARANCE UR: CLEAR
BILIRUB UR QL STRIP: NEGATIVE
CLUE CELLS: PRESENT
COLOR UR AUTO: YELLOW
ERYTHROCYTE [DISTWIDTH] IN BLOOD BY AUTOMATED COUNT: 13.6 % (ref 10–15)
GLUCOSE UR STRIP-MCNC: NEGATIVE MG/DL
HCT VFR BLD AUTO: 37.1 % (ref 35–47)
HGB BLD-MCNC: 12.6 G/DL (ref 11.7–15.7)
HGB UR QL STRIP: NEGATIVE
KETONES UR STRIP-MCNC: ABNORMAL MG/DL
LEUKOCYTE ESTERASE UR QL STRIP: ABNORMAL
MCH RBC QN AUTO: 30.4 PG (ref 26.5–33)
MCHC RBC AUTO-ENTMCNC: 34 G/DL (ref 31.5–36.5)
MCV RBC AUTO: 90 FL (ref 78–100)
NITRATE UR QL: NEGATIVE
PH UR STRIP: 7 [PH] (ref 5–8)
PLATELET # BLD AUTO: 363 10E3/UL (ref 150–450)
RBC # BLD AUTO: 4.14 10E6/UL (ref 3.8–5.2)
SP GR UR STRIP: 1.02 (ref 1–1.03)
TRICHOMONAS, WET PREP: ABNORMAL
UROBILINOGEN UR STRIP-ACNC: 0.2 E.U./DL
WBC # BLD AUTO: 9.1 10E3/UL (ref 4–11)
WBC'S/HIGH POWER FIELD, WET PREP: ABNORMAL
YEAST, WET PREP: ABNORMAL

## 2024-01-03 PROCEDURE — 87591 N.GONORRHOEAE DNA AMP PROB: CPT | Performed by: FAMILY MEDICINE

## 2024-01-03 PROCEDURE — 86780 TREPONEMA PALLIDUM: CPT | Performed by: FAMILY MEDICINE

## 2024-01-03 PROCEDURE — 84443 ASSAY THYROID STIM HORMONE: CPT | Performed by: FAMILY MEDICINE

## 2024-01-03 PROCEDURE — 83036 HEMOGLOBIN GLYCOSYLATED A1C: CPT | Performed by: FAMILY MEDICINE

## 2024-01-03 PROCEDURE — 87389 HIV-1 AG W/HIV-1&-2 AB AG IA: CPT | Performed by: FAMILY MEDICINE

## 2024-01-03 PROCEDURE — 86900 BLOOD TYPING SEROLOGIC ABO: CPT | Performed by: FAMILY MEDICINE

## 2024-01-03 PROCEDURE — 87086 URINE CULTURE/COLONY COUNT: CPT | Performed by: FAMILY MEDICINE

## 2024-01-03 PROCEDURE — 87210 SMEAR WET MOUNT SALINE/INK: CPT | Performed by: FAMILY MEDICINE

## 2024-01-03 PROCEDURE — 85027 COMPLETE CBC AUTOMATED: CPT | Performed by: FAMILY MEDICINE

## 2024-01-03 PROCEDURE — 86850 RBC ANTIBODY SCREEN: CPT | Performed by: FAMILY MEDICINE

## 2024-01-03 PROCEDURE — 99214 OFFICE O/P EST MOD 30 MIN: CPT | Performed by: FAMILY MEDICINE

## 2024-01-03 PROCEDURE — 86762 RUBELLA ANTIBODY: CPT | Performed by: FAMILY MEDICINE

## 2024-01-03 PROCEDURE — 87340 HEPATITIS B SURFACE AG IA: CPT | Performed by: FAMILY MEDICINE

## 2024-01-03 PROCEDURE — 87491 CHLMYD TRACH DNA AMP PROBE: CPT | Performed by: FAMILY MEDICINE

## 2024-01-03 PROCEDURE — 36415 COLL VENOUS BLD VENIPUNCTURE: CPT | Performed by: FAMILY MEDICINE

## 2024-01-03 PROCEDURE — 81003 URINALYSIS AUTO W/O SCOPE: CPT | Performed by: FAMILY MEDICINE

## 2024-01-03 PROCEDURE — 86901 BLOOD TYPING SEROLOGIC RH(D): CPT | Performed by: FAMILY MEDICINE

## 2024-01-03 RX ORDER — METRONIDAZOLE 7.5 MG/G
1 GEL VAGINAL DAILY
Qty: 70 G | Refills: 0 | Status: SHIPPED | OUTPATIENT
Start: 2024-01-03 | End: 2024-01-08

## 2024-01-03 ASSESSMENT — PATIENT HEALTH QUESTIONNAIRE - PHQ9
SUM OF ALL RESPONSES TO PHQ QUESTIONS 1-9: 0
SUM OF ALL RESPONSES TO PHQ QUESTIONS 1-9: 0
10. IF YOU CHECKED OFF ANY PROBLEMS, HOW DIFFICULT HAVE THESE PROBLEMS MADE IT FOR YOU TO DO YOUR WORK, TAKE CARE OF THINGS AT HOME, OR GET ALONG WITH OTHER PEOPLE: NOT DIFFICULT AT ALL

## 2024-01-03 NOTE — PROGRESS NOTES
ASSESSMENT/PLAN:       ICD-10-CM    1. Encounter for supervision of other normal pregnancy in first trimester  Z34.81 Urinalysis Macroscopic     Urine Culture Aerobic Bacterial - lab collect     Urinalysis Macroscopic     Urine Culture Aerobic Bacterial - lab collect          33 year old , 11w3d weeks of pregnancy with HERIBERTO of 2024, by Ultrasound    I am glad you are staying hydrated and try to get enough calories.  You can use the Phenergan as prescribed if needed for nausea.    For acid reflux, burping or stomach upset, you can take Tums max of 4 in a day if 500 mg tabs, or max of 2000 mg per day, and Pepcid 20 mg twice a day as needed. If that is not helping, Dr. Sood can prescribe Protonix.     With family history of some birth defects I am ordering a noninvasive prenatal testing which will test for trisomy 18, 13 and 21.  We will not check the gender of the baby.    We will help set next appointment in 1 month.  At that visit we will do the alpha-fetoprotein which is the blood test to screen for spina bifida.    Since we did not hear heart tones today likely things are just fine but I am ordering an ultrasound to be on the safe side.  They will call you or you can call [phone].    Set OB appointments every month until 28 weeks, then every 2 weeks until 36 weeks then every week until 42 weeks, to be able to change last 2 visits for baby.    Recommend 64-96 oz of non-caffenated fluid per day.    Will offer the latest COVID shot at your next visit.    We also can discuss the new RSV shot between 32 and 36 weeks pregnant.    Prepregnancy body mass index over 30 will do some extra testing.  Will try to add an A1c and thyroid onto today's labs.  At 36 weeks will have a biophysical profile ultrasound and weekly nonstress tests.    Counseling given:   - Follow up in 4-5 weeks for return OB visit.  - Recommended weight gain for pregnancy: < 15 lbs.    30 minutes spent with this patient including chart  review, patient visit and documentation.    SUBJECTIVE:     HPI:    This is a 33 year old female patient,  who presents for her first obstetrical visit.    Vomiting daily.  Once a day when it happens, then nauseated.  Staying hydrated.  Eating ok when feeling better.  If she cn catch it in the am right away with carbs she is better.  Does have Phenergan to use if needed, not needed yet.  No vaginal bleeding. Sicker this time than last pregnancy.  Drinking and eating ok.    HERIBERTO: 2024, by Ultrasound.  She is 11w3d weeks.  Her cycles are regular.  Her last menstrual period was normal.   Since her LMP, she has experienced  nausea and emesis).   She denies vaginal bleeding.    Additional History:   Gestational hypertension at the end of her first pregnancy.  No gestational diabetes history.  Stage 1 postpartum hemorrhage after vaginal delivery of her daughter on 2022, cord was avulsing so I performed a manual removal of the placenta, stage I postpartum hemorrhage, second-degree laceration, hemoglobin to 9.3.    Have you travelled during the pregnancy?No  Have your sexual partner(s) travelled during the pregnancy?No      HISTORY:   Planned Pregnancy: Yes  Marital Status:   Occupation: ER tech, going back to school for nursing  Living in Household: Spouse and Children    Past History:  Her past medical history is non-contributory.      She has a history of   see above    Since her last LMP she denies use of alcohol, tobacco and street drugs.    Past medical, surgical, social and family history were reviewed and updated in [facility].        Current Outpatient Medications   Medication    fish oil-omega-3 fatty acids 1000 MG capsule    Prenatal Vit-Fe Fumarate-FA (PRENATAL MULTIVITAMIN W/IRON) 27-0.8 MG tablet    Vitamin D3 (CHOLECALCIFEROL) 125 MCG (5000 UT) tablet    promethazine (PHENERGAN) 25 MG tablet     No current facility-administered medications for this visit.       ROS:   12 point review of  systems negative other than symptoms noted below or in the HPI.      OBJECTIVE:     EXAM:  /74 (BP Location: Left arm, Patient Position: Sitting, Cuff Size: Adult Regular)   Pulse 91   Temp 98.1  F (36.7  C) (Oral)   Resp 18   Wt 99.4 kg (219 lb 3.2 oz)   SpO2 97%   Breastfeeding Yes   BMI 32.37 kg/m   Body mass index is 32.37 kg/m .    GENERAL: healthy, alert and no distress  EYES: Eyes grossly normal to inspection, PERRL and conjunctivae and sclerae normal  HENT: ear canals and TM's normal, nose and mouth without ulcers or lesions  NECK: no adenopathy, no asymmetry, masses, or scars and thyroid normal to palpation  RESP: lungs clear to auscultation - no rales, rhonchi or wheezes  BREAST: normal without masses, tenderness or nipple discharge and no palpable axillary masses or adenopathy  CV: regular rate and rhythm, normal S1 S2, no S3 or S4, no murmur, click or rub, no peripheral edema and peripheral pulses strong  ABDOMEN: soft, nontender, no hepatosplenomegaly, no masses and bowel sounds normal   (female): normal female external genitalia, normal urethral meatus, vaginal mucosa pink, moist, well rugated, and normal cervix/adnexa/uterus without masses or discharge  MS: no gross musculoskeletal defects noted, no edema  SKIN: no suspicious lesions or rashes  NEURO: Normal strength and tone, mentation intact and speech normal  PSYCH: mentation appears normal, affect normal/bright  Christine Sood MD  Answers submitted by the patient for this visit:  Patient Health Questionnaire (Submitted on 1/3/2024)  If you checked off any problems, how difficult have these problems made it for you to do your work, take care of things at home, or get along with other people?: Not difficult at all  PHQ9 TOTAL SCORE: 0

## 2024-01-03 NOTE — PATIENT INSTRUCTIONS
I am glad you are staying hydrated and try to get enough calories.  You can use the Phenergan as prescribed if needed for nausea.    For acid reflux, burping or stomach upset, you can take Tums max of 4 in a day if 500 mg tabs, or max of 2000 mg per day, and Pepcid 20 mg twice a day as needed. If that is not helping, Dr. Sood can prescribe Protonix.     With family history of some birth defects I am ordering a noninvasive prenatal testing which will test for trisomy 18, 13 and 21.  We will not check the gender of the baby.    We will help set next appointment in 1 month.  At that visit we will do the alpha-fetoprotein which is the blood test to screen for spina bifida.    Since we did not hear heart tones today likely things are just fine but I am ordering an ultrasound to be on the safe side.  They will call you or you can call [phone].    Set OB appointments every month until 28 weeks, then every 2 weeks until 36 weeks then every week until 42 weeks, to be able to change last 2 visits for baby.    Recommend 64-96 oz of non-caffenated fluid per day.    Will offer the latest COVID shot at your next visit.    We also can discuss the new RSV shot between 32 and 36 weeks pregnant.    Prepregnancy body mass index over 30 will do some extra testing.  Will try to add an A1c and thyroid onto today's labs.  At 36 weeks will have a biophysical profile ultrasound and weekly nonstress tests.

## 2024-01-04 ENCOUNTER — TELEPHONE (OUTPATIENT)
Dept: FAMILY MEDICINE | Facility: CLINIC | Age: 34
End: 2024-01-04
Payer: COMMERCIAL

## 2024-01-04 ENCOUNTER — HOSPITAL ENCOUNTER (OUTPATIENT)
Dept: ULTRASOUND IMAGING | Facility: HOSPITAL | Age: 34
Discharge: HOME | End: 2024-01-04
Attending: FAMILY MEDICINE | Admitting: FAMILY MEDICINE
Payer: COMMERCIAL

## 2024-01-04 DIAGNOSIS — Z34.81 ENCOUNTER FOR SUPERVISION OF OTHER NORMAL PREGNANCY IN FIRST TRIMESTER: ICD-10-CM

## 2024-01-04 LAB
C TRACH DNA SPEC QL NAA+PROBE: NEGATIVE
HBA1C MFR BLD: 4.9 % (ref 0–5.6)
HBV SURFACE AG SERPL QL IA: NONREACTIVE
HIV 1+2 AB+HIV1 P24 AG SERPL QL IA: NONREACTIVE
N GONORRHOEA DNA SPEC QL NAA+PROBE: NEGATIVE
RUBV IGG SERPL QL IA: 1.06 INDEX
RUBV IGG SERPL QL IA: POSITIVE
T PALLIDUM AB SER QL: NONREACTIVE
TSH SERPL DL<=0.005 MIU/L-ACNC: 1.57 UIU/ML (ref 0.3–4.2)

## 2024-01-04 PROCEDURE — 76801 OB US < 14 WKS SINGLE FETUS: CPT

## 2024-01-04 NOTE — TELEPHONE ENCOUNTER
They can just call our clinic number at [phone].  That is the answering service and we always have an OB provider on-call that they can call if needed.  Thank you.

## 2024-01-04 NOTE — TELEPHONE ENCOUNTER
Incoming call from [facility].  She is looking for a good after-hours phone number in regards to OB ultrasound that was ordered yesterday.    Patient is on schedule at end of day today 5:15pm and they would like to have a good contact number in case there are concerns noted on ultrasound.    Callback number is [phone]

## 2024-01-04 NOTE — TELEPHONE ENCOUNTER
Called [facility] and relayed message from PCP below.    Eder Flores RN     [facility]

## 2024-01-05 LAB — BACTERIA UR CULT: NO GROWTH

## 2024-01-09 ENCOUNTER — OFFICE VISIT (OUTPATIENT)
Dept: PEDIATRICS | Facility: CLINIC | Age: 34
End: 2024-01-09
Payer: COMMERCIAL

## 2024-01-09 ENCOUNTER — MYC MEDICAL ADVICE (OUTPATIENT)
Dept: FAMILY MEDICINE | Facility: CLINIC | Age: 34
End: 2024-01-09
Payer: COMMERCIAL

## 2024-01-09 VITALS
HEART RATE: 97 BPM | OXYGEN SATURATION: 98 % | DIASTOLIC BLOOD PRESSURE: 86 MMHG | SYSTOLIC BLOOD PRESSURE: 122 MMHG | RESPIRATION RATE: 18 BRPM | TEMPERATURE: 97.9 F

## 2024-01-09 DIAGNOSIS — O21.9 VOMITING OR NAUSEA OF PREGNANCY: Primary | ICD-10-CM

## 2024-01-09 LAB
ALBUMIN UR-MCNC: NEGATIVE MG/DL
APPEARANCE UR: CLEAR
BILIRUB UR QL STRIP: NEGATIVE
COLOR UR AUTO: YELLOW
GLUCOSE UR STRIP-MCNC: NEGATIVE MG/DL
HGB UR QL STRIP: NEGATIVE
KETONES UR STRIP-MCNC: ABNORMAL MG/DL
LEUKOCYTE ESTERASE UR QL STRIP: NEGATIVE
NITRATE UR QL: NEGATIVE
PH UR STRIP: 5.5 [PH] (ref 5–8)
SP GR UR STRIP: 1.02 (ref 1–1.03)
UROBILINOGEN UR STRIP-ACNC: 0.2 E.U./DL

## 2024-01-09 PROCEDURE — 81003 URINALYSIS AUTO W/O SCOPE: CPT | Performed by: EMERGENCY MEDICINE

## 2024-01-09 PROCEDURE — 99214 OFFICE O/P EST MOD 30 MIN: CPT | Mod: 25 | Performed by: EMERGENCY MEDICINE

## 2024-01-09 PROCEDURE — 99207 REFERRAL TO ACUTE AND DIAGNOSTIC SERVICES: CPT | Performed by: FAMILY MEDICINE

## 2024-01-09 PROCEDURE — 96361 HYDRATE IV INFUSION ADD-ON: CPT | Performed by: EMERGENCY MEDICINE

## 2024-01-09 PROCEDURE — 96374 THER/PROPH/DIAG INJ IV PUSH: CPT | Performed by: EMERGENCY MEDICINE

## 2024-01-09 RX ORDER — ONDANSETRON 2 MG/ML
4 INJECTION INTRAMUSCULAR; INTRAVENOUS
Status: COMPLETED | OUTPATIENT
Start: 2024-01-09 | End: 2024-01-09

## 2024-01-09 RX ORDER — ONDANSETRON 4 MG/1
4 TABLET, ORALLY DISINTEGRATING ORAL EVERY 6 HOURS PRN
Qty: 20 TABLET | Refills: 0 | Status: SHIPPED | OUTPATIENT
Start: 2024-01-09 | End: 2024-06-10

## 2024-01-09 RX ADMIN — Medication 1000 ML: at 13:11

## 2024-01-09 RX ADMIN — ONDANSETRON 4 MG: 2 INJECTION INTRAMUSCULAR; INTRAVENOUS at 13:19

## 2024-01-09 RX ADMIN — Medication 1000 ML: at 14:33

## 2024-01-09 ASSESSMENT — PAIN SCALES - GENERAL: PAINLEVEL: NO PAIN (0)

## 2024-01-09 NOTE — TELEPHONE ENCOUNTER
Called [facility] to discuss patient.  They are able to accept the patient now.  They recommend that patient had over when she can.    Called patient and discussed [facility] in more detail.  Patient will have over there now.    Referral to Acute and Diagnostic Services    [phone] ([facility]) [address], [place]  [address]    Transition to Acute & Diagnostic Services Clinic has been discussed with patient, and she agrees with next level of care.   Patient understands that evaluation/treatment at [facility] typically takes significantly longer than in clinic/urgent care (>2 hours).  The [facility] has been contacted by provider/staff to confirm patient acceptance.         Special issues:      None

## 2024-01-09 NOTE — LETTER
Abibmola Babcock  1990    1-9-24    To whom it may concern:    This patient is having nausea and vomiting in pregnancy.  She is going into the acute diagnostic clinic today for IV fluids.  Please excuse her from work on 1/8/2024 and 1/9/2024.          Sincerely,        Christine Sood MD

## 2024-01-09 NOTE — TELEPHONE ENCOUNTER
Will you please contact the [facility] and see if they can get her in for IV fluids and possible antiemetics.  I put the order in, thank you.  Christine Sood MD
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
## 2024-01-09 NOTE — PATIENT INSTRUCTIONS
Home to rest today    Fill your prescription and take as directed    Keep hydrated    Try eating smaller meals more frequently throughout the day.

## 2024-01-11 DIAGNOSIS — Z82.79 FAMILY HISTORY OF BIRTH DEFECT: Primary | ICD-10-CM

## 2024-01-22 LAB — SCANNED LAB RESULT: NORMAL

## 2024-02-05 ENCOUNTER — PRENATAL OFFICE VISIT (OUTPATIENT)
Dept: FAMILY MEDICINE | Facility: CLINIC | Age: 34
End: 2024-02-05
Payer: COMMERCIAL

## 2024-02-05 VITALS
RESPIRATION RATE: 16 BRPM | OXYGEN SATURATION: 99 % | TEMPERATURE: 97.9 F | BODY MASS INDEX: 33.3 KG/M2 | DIASTOLIC BLOOD PRESSURE: 71 MMHG | HEIGHT: 69 IN | HEART RATE: 94 BPM | SYSTOLIC BLOOD PRESSURE: 114 MMHG | WEIGHT: 224.8 LBS

## 2024-02-05 DIAGNOSIS — Z34.82 ENCOUNTER FOR SUPERVISION OF OTHER NORMAL PREGNANCY IN SECOND TRIMESTER: Primary | ICD-10-CM

## 2024-02-05 DIAGNOSIS — Z82.79 FAMILY HISTORY OF SPINA BIFIDA: ICD-10-CM

## 2024-02-05 PROCEDURE — 90480 ADMN SARSCOV2 VAC 1/ONLY CMP: CPT | Performed by: FAMILY MEDICINE

## 2024-02-05 PROCEDURE — 99000 SPECIMEN HANDLING OFFICE-LAB: CPT | Performed by: FAMILY MEDICINE

## 2024-02-05 PROCEDURE — 36415 COLL VENOUS BLD VENIPUNCTURE: CPT | Performed by: FAMILY MEDICINE

## 2024-02-05 PROCEDURE — 91320 SARSCV2 VAC 30MCG TRS-SUC IM: CPT | Performed by: FAMILY MEDICINE

## 2024-02-05 PROCEDURE — 99207 PR PRENATAL VISIT: CPT | Performed by: FAMILY MEDICINE

## 2024-02-05 PROCEDURE — 82105 ALPHA-FETOPROTEIN SERUM: CPT | Mod: 90 | Performed by: FAMILY MEDICINE

## 2024-02-05 NOTE — PROGRESS NOTES
A/P:  33 year old  at 16 1/7 week gestation.  AFP today to screen for spina bifida.  OB ultrasound ordered for full fetal survey to be done around 20 weeks pregnant.  Can call [phone].  Follow up in 4 weeks.  Covid shot today.    S: Patient feels fetal movement.  Denies lower extremity edema.  Denies vaginal bleeding.  Feeling a lot better.  Had been vomiting daily and did go to [facility] clinic for IV fluids and helped.  OB labs normal except treated for BV.  No symptoms of vaginal discharge odor.  Normal cell free DNA and surprise gender.    O:  DTR 2+, see OB flowsheet

## 2024-02-05 NOTE — PATIENT INSTRUCTIONS
AFP today to screen for spina bifida.    OB ultrasound ordered for full fetal survey to be done around 20 weeks pregnant.  Can call [phone].    Follow up in 4 weeks.    Covid shot today.

## 2024-02-07 LAB
# FETUSES US: NORMAL
AFP MOM SERPL: 0.87
AFP SERPL-MCNC: 23 NG/ML
AGE - REPORTED: 34.3 YR
CURRENT SMOKER: NO
FAMILY MEMBER DISEASES HX: YES
GA METHOD: NORMAL
GA: NORMAL WK
IDDM PATIENT QL: NO
INTEGRATED SCN PATIENT-IMP: NORMAL
SPECIMEN DRAWN SERPL: NORMAL

## 2024-03-05 ENCOUNTER — HOSPITAL ENCOUNTER (OUTPATIENT)
Dept: ULTRASOUND IMAGING | Facility: HOSPITAL | Age: 34
Discharge: HOME | End: 2024-03-05
Attending: FAMILY MEDICINE | Admitting: FAMILY MEDICINE
Payer: COMMERCIAL

## 2024-03-05 DIAGNOSIS — Z34.82 ENCOUNTER FOR SUPERVISION OF OTHER NORMAL PREGNANCY IN SECOND TRIMESTER: ICD-10-CM

## 2024-03-05 PROCEDURE — 76805 OB US >/= 14 WKS SNGL FETUS: CPT

## 2024-03-07 ENCOUNTER — PRENATAL OFFICE VISIT (OUTPATIENT)
Dept: FAMILY MEDICINE | Facility: CLINIC | Age: 34
End: 2024-03-07
Payer: COMMERCIAL

## 2024-03-07 VITALS
SYSTOLIC BLOOD PRESSURE: 114 MMHG | TEMPERATURE: 98.3 F | DIASTOLIC BLOOD PRESSURE: 73 MMHG | BODY MASS INDEX: 34.18 KG/M2 | WEIGHT: 230.8 LBS | HEIGHT: 69 IN | RESPIRATION RATE: 16 BRPM | OXYGEN SATURATION: 98 % | HEART RATE: 88 BPM

## 2024-03-07 DIAGNOSIS — Z34.02 ENCOUNTER FOR SUPERVISION OF LOW-RISK FIRST PREGNANCY IN SECOND TRIMESTER: Primary | ICD-10-CM

## 2024-03-07 PROCEDURE — 99207 PR PRENATAL VISIT: CPT | Performed by: FAMILY MEDICINE

## 2024-03-07 NOTE — PATIENT INSTRUCTIONS
Next visit 1 hour glucose, Hgb and Syphilis.    For acid reflux, burping or stomach upset, you can take Tums max of 4 in a day if 500 mg tabs, or max of 2000 mg per day, and Pepcid 20 mg twice a day as needed. If that is not helping, Dr. Sood can prescribe Protonix.     Discussed weaning from breastfeeding.

## 2024-03-07 NOTE — PROGRESS NOTES
A/P: 33-year-old  2 para 1-0-0-1 female at 20-5/7 weeks gestation.  With morning sickness and possible gastritis.  Next visit 1 hour glucose, Hgb and Syphilis.  For acid reflux, burping or stomach upset, you can take Tums max of 4 in a day if 500 mg tabs, or max of 2000 mg per day, and Pepcid 20 mg twice a day as needed. If that is not helping, Dr. Sood can prescribe Protonix.   Discussed weaning from breastfeeding.    S: Patient feels good fetal movement.  Denies regular contractions.  Denies lower extremity edema.  Denies vaginal bleeding.  Still with some nausea and vomiting.  Has Phenergen and Zofran, do not help much.  On and off, not daily.  She has cut her work down to 0.5 FTE right now.  She still breast-feeding her 94-calxa-jzz daughter.  Thinking about weaning in her third trimester.    O:  DTR 2+, see OB flowsheet

## 2024-03-25 ENCOUNTER — MYC MEDICAL ADVICE (OUTPATIENT)
Dept: FAMILY MEDICINE | Facility: CLINIC | Age: 34
End: 2024-03-25
Payer: COMMERCIAL

## 2024-03-25 DIAGNOSIS — K21.00 GASTROESOPHAGEAL REFLUX DISEASE WITH ESOPHAGITIS WITHOUT HEMORRHAGE: Primary | ICD-10-CM

## 2024-03-25 RX ORDER — PANTOPRAZOLE SODIUM 20 MG/1
20 TABLET, DELAYED RELEASE ORAL DAILY
Qty: 90 TABLET | Refills: 3 | Status: SHIPPED | OUTPATIENT
Start: 2024-03-25 | End: 2024-09-12

## 2024-04-01 ENCOUNTER — PRENATAL OFFICE VISIT (OUTPATIENT)
Dept: FAMILY MEDICINE | Facility: CLINIC | Age: 34
End: 2024-04-01
Payer: COMMERCIAL

## 2024-04-01 VITALS
BODY MASS INDEX: 35.28 KG/M2 | HEART RATE: 99 BPM | DIASTOLIC BLOOD PRESSURE: 68 MMHG | OXYGEN SATURATION: 98 % | WEIGHT: 238.2 LBS | SYSTOLIC BLOOD PRESSURE: 115 MMHG | RESPIRATION RATE: 20 BRPM | HEIGHT: 69 IN | TEMPERATURE: 98.1 F

## 2024-04-01 DIAGNOSIS — Z34.02 ENCOUNTER FOR SUPERVISION OF LOW-RISK FIRST PREGNANCY IN SECOND TRIMESTER: Primary | ICD-10-CM

## 2024-04-01 LAB
GLUCOSE 1H P 50 G GLC PO SERPL-MCNC: 110 MG/DL (ref 70–129)
HGB BLD-MCNC: 11.7 G/DL (ref 11.7–15.7)

## 2024-04-01 PROCEDURE — 99207 PR PRENATAL VISIT: CPT | Performed by: FAMILY MEDICINE

## 2024-04-01 PROCEDURE — 85018 HEMOGLOBIN: CPT | Performed by: FAMILY MEDICINE

## 2024-04-01 PROCEDURE — 82950 GLUCOSE TEST: CPT | Performed by: FAMILY MEDICINE

## 2024-04-01 PROCEDURE — 36415 COLL VENOUS BLD VENIPUNCTURE: CPT | Performed by: FAMILY MEDICINE

## 2024-04-01 PROCEDURE — 86780 TREPONEMA PALLIDUM: CPT | Performed by: FAMILY MEDICINE

## 2024-04-01 NOTE — PATIENT INSTRUCTIONS
Today 1 hour glucose, Hgb and Syphilis.    Follow-up in 4 weeks.    Continue Protonix 20 mg daily.    If vomiting persists, we can get an ultrasound for growth.

## 2024-04-01 NOTE — PROGRESS NOTES
A/P: 34-year-old  2 para 1-0-0-1 female at 24-1/7 weeks gestation.  O+ blood type.  Prolonged nausea and vomiting in pregnancy.  GERD.  Today 1 hour glucose, Hgb and Syphilis.  Follow-up in 4 weeks.  Continue Protonix 20 mg daily.  If vomiting persists, we can get an ultrasound for growth.    S: Patient feels good fetal movement.  Denies regular contractions.  Denies lower extremity edema.  Denies vaginal bleeding.  On Protonix for GERD, better for 1-2  days.  Vomiting many days of week until last week.  Has had to miss a lot of work, hoping it is improving.  No vomiting for the past 1 to 2 days.    O:  DTR 2+, see OB flowsheet

## 2024-04-02 LAB — T PALLIDUM AB SER QL: NONREACTIVE

## 2024-05-01 ENCOUNTER — PRENATAL OFFICE VISIT (OUTPATIENT)
Dept: FAMILY MEDICINE | Facility: CLINIC | Age: 34
End: 2024-05-01
Payer: COMMERCIAL

## 2024-05-01 VITALS
OXYGEN SATURATION: 98 % | DIASTOLIC BLOOD PRESSURE: 73 MMHG | TEMPERATURE: 98.3 F | RESPIRATION RATE: 18 BRPM | WEIGHT: 249.4 LBS | SYSTOLIC BLOOD PRESSURE: 114 MMHG | BODY MASS INDEX: 37.1 KG/M2 | HEART RATE: 97 BPM

## 2024-05-01 DIAGNOSIS — Z87.59 HISTORY OF HYPEREMESIS GRAVIDARUM: Primary | ICD-10-CM

## 2024-05-01 PROCEDURE — 99207 PR PRENATAL VISIT: CPT | Performed by: FAMILY MEDICINE

## 2024-05-01 PROCEDURE — 90471 IMMUNIZATION ADMIN: CPT | Performed by: FAMILY MEDICINE

## 2024-05-01 PROCEDURE — 90715 TDAP VACCINE 7 YRS/> IM: CPT | Performed by: FAMILY MEDICINE

## 2024-05-01 NOTE — PROGRESS NOTES
A/P: 34-year-old  2 para 1-0-0-1 female at 28-3/7 weeks gestation.  O+ blood type.  Prolonged hyperemesis gravidarum.  GERD.  Obese with pregravid BMI 32.5.  We will check a hemoglobin at 32 weeks and again at 36 weeks.  Contractions here and there are normal  Great to continue breast-feeding and if we had any concerns of  labor with shortened cervix or 3 or more contractions in an hour, then would recommend stopping.  If that is happening want to be monitored at labor and delivery.  Tdap shot today.  With your history of hyperemesis gravidarum I am ordering an ultrasound.  They should call you or you can call [phone] to set that up.  Want to check growth, cervical length and JORG ELUIS.  Follow-up in 2 weeks.  UA each visit next time.  At 36 weeks we will get a biophysical profile ultrasound and start weekly nonstress tests with history of pregravid BMI 32.5.    S: Patient feels good fetal movement.  Denies regular contractions.  Denies lower extremity edema.  Denies vaginal bleeding.  Her 17 month old daughter nurses 1-2 times a day for comfort and daughter is having hard time weaning.  Patient wants to ensure this is safe in her third trimester.  She is feeling better on the Protonix.    O:  DTR 2+, see OB flowsheet

## 2024-05-01 NOTE — PATIENT INSTRUCTIONS
We will check a hemoglobin at 32 weeks and again at 36 weeks.  Contractions here and there are normal    Great to continue breast-feeding and if we had any concerns of  labor with shortened cervix or 3 or more contractions in an hour, then would recommend stopping.  If that is happening want to be monitored at labor and delivery.    Tdap shot today.    With your history of hyperemesis gravidarum I am ordering an ultrasound.  They should call you or you can call [phone] to set that up.  Want to check growth, cervical length and JORGE LUIS.    Follow-up in 2 weeks.    UA each visit next time.    At 36 weeks we will get a biophysical profile ultrasound and start weekly nonstress tests with history of pregravid BMI 32.5.

## 2024-05-06 ENCOUNTER — HOSPITAL ENCOUNTER (OUTPATIENT)
Dept: ULTRASOUND IMAGING | Facility: CLINIC | Age: 34
Discharge: HOME | End: 2024-05-06
Attending: FAMILY MEDICINE | Admitting: FAMILY MEDICINE
Payer: COMMERCIAL

## 2024-05-06 DIAGNOSIS — Z87.59 HISTORY OF HYPEREMESIS GRAVIDARUM: ICD-10-CM

## 2024-05-06 PROCEDURE — 76816 OB US FOLLOW-UP PER FETUS: CPT

## 2024-05-16 ENCOUNTER — PRENATAL OFFICE VISIT (OUTPATIENT)
Dept: FAMILY MEDICINE | Facility: CLINIC | Age: 34
End: 2024-05-16
Payer: COMMERCIAL

## 2024-05-16 VITALS
RESPIRATION RATE: 18 BRPM | DIASTOLIC BLOOD PRESSURE: 72 MMHG | OXYGEN SATURATION: 97 % | SYSTOLIC BLOOD PRESSURE: 108 MMHG | WEIGHT: 248.8 LBS | TEMPERATURE: 97.8 F | HEART RATE: 99 BPM | BODY MASS INDEX: 37.01 KG/M2

## 2024-05-16 DIAGNOSIS — E66.09 CLASS 1 OBESITY DUE TO EXCESS CALORIES WITHOUT SERIOUS COMORBIDITY IN ADULT, UNSPECIFIED BMI: ICD-10-CM

## 2024-05-16 DIAGNOSIS — E66.811 CLASS 1 OBESITY DUE TO EXCESS CALORIES WITHOUT SERIOUS COMORBIDITY IN ADULT, UNSPECIFIED BMI: ICD-10-CM

## 2024-05-16 DIAGNOSIS — Z34.83 ENCOUNTER FOR SUPERVISION OF OTHER NORMAL PREGNANCY IN THIRD TRIMESTER: Primary | ICD-10-CM

## 2024-05-16 PROCEDURE — 81003 URINALYSIS AUTO W/O SCOPE: CPT | Performed by: FAMILY MEDICINE

## 2024-05-16 PROCEDURE — 99207 PR PRENATAL VISIT: CPT | Performed by: FAMILY MEDICINE

## 2024-05-16 NOTE — PROGRESS NOTES
A/P: 34-year-old  2 para 1-0-0-1 female at 30-4/7 weeks gestation.  O+ blood type.  Obese.  Baby in breech position now.  GERD controlled on Protonix.  Mild anemia.  Recommend 64-96 oz of non-caffenated fluid per day.  Follow-up in 2 weeks.  At that visit we will check a hemoglobin.  Due to BMI being over 30 at the start of pregnancy we will going to get a biophysical profile ultrasound with growth to be done at 36 weeks.  I am planning on ordering that today and you can set that up for when you are 36 weeks pregnant by calling [phone].  At that ultrasound we will also check position.  If baby is not headdown at that visit we will refer to OB/GYN to discuss version.  At the 36-week visit we will check a hemoglobin as well.   Starting at 36 weeks we will have a weekly nonstress test.    S: Patient feels good fetal movement.  Denies regular contractions.  Denies lower extremity edema.  Denies vaginal bleeding.  GERD better with Protonix.    O:  DTR 2+, UA- Tr ketones, see OB flowsheet  Ultrasound 2024: 66% growth, cervix 4.2 cm, breech

## 2024-05-16 NOTE — PATIENT INSTRUCTIONS
Recommend 64-96 oz of non-caffenated fluid per day.    Follow-up in 2 weeks.  At that visit we will check a hemoglobin.    Due to BMI being over 30 at the start of pregnancy we will going to get a biophysical profile ultrasound with growth to be done at 36 weeks.  I am planning on ordering that today and you can set that up for when you are 36 weeks pregnant by calling [phone].  At that ultrasound we will also check position.  If baby is not headdown at that visit we will refer to OB/GYN to discuss version.    At the 36-week visit we will check a hemoglobin as well.     Starting at 36 weeks we will have a weekly nonstress test.

## 2024-05-30 ENCOUNTER — PRENATAL OFFICE VISIT (OUTPATIENT)
Dept: FAMILY MEDICINE | Facility: CLINIC | Age: 34
End: 2024-05-30
Payer: COMMERCIAL

## 2024-05-30 VITALS
RESPIRATION RATE: 18 BRPM | BODY MASS INDEX: 37.1 KG/M2 | TEMPERATURE: 97.8 F | OXYGEN SATURATION: 97 % | SYSTOLIC BLOOD PRESSURE: 111 MMHG | WEIGHT: 249.4 LBS | DIASTOLIC BLOOD PRESSURE: 70 MMHG | HEART RATE: 90 BPM

## 2024-05-30 DIAGNOSIS — Z34.83 ENCOUNTER FOR SUPERVISION OF OTHER NORMAL PREGNANCY IN THIRD TRIMESTER: Primary | ICD-10-CM

## 2024-05-30 LAB
ALBUMIN UR-MCNC: NEGATIVE MG/DL
APPEARANCE UR: CLEAR
BILIRUB UR QL STRIP: NEGATIVE
COLOR UR AUTO: YELLOW
GLUCOSE UR STRIP-MCNC: NEGATIVE MG/DL
HGB BLD-MCNC: 11.5 G/DL (ref 11.7–15.7)
HGB UR QL STRIP: NEGATIVE
KETONES UR STRIP-MCNC: ABNORMAL MG/DL
LEUKOCYTE ESTERASE UR QL STRIP: NEGATIVE
NITRATE UR QL: NEGATIVE
PH UR STRIP: 5.5 [PH] (ref 5–8)
SP GR UR STRIP: <=1.005 (ref 1–1.03)
UROBILINOGEN UR STRIP-ACNC: 0.2 E.U./DL

## 2024-05-30 PROCEDURE — 36415 COLL VENOUS BLD VENIPUNCTURE: CPT | Performed by: FAMILY MEDICINE

## 2024-05-30 PROCEDURE — 81003 URINALYSIS AUTO W/O SCOPE: CPT | Performed by: FAMILY MEDICINE

## 2024-05-30 PROCEDURE — 85018 HEMOGLOBIN: CPT | Performed by: FAMILY MEDICINE

## 2024-05-30 PROCEDURE — 99207 PR PRENATAL VISIT: CPT | Performed by: FAMILY MEDICINE

## 2024-05-30 NOTE — PROGRESS NOTES
A/P: 34-year-old  2 para 1-0-0-1 female at 32-4/7 weeks gestation.  O+ blood type.  Obese.  Mild anemia.  Baby in breech presentation last ultrasound.  GERD, controlled on Protonix.    Follow-up in 2 weeks  Hemoglobin today.  Due to BMI being over 30 at the start of pregnancy we will going to get a biophysical profile ultrasound with growth to be done at 36 weeks.  That is scheduled.  If baby is not headdown at that visit we will refer to OB/GYN to discuss version.  At the 36-week visit we will check a hemoglobin GBS.  Starting at 36 weeks we will have a weekly nonstress test.  Recommend 64-96 oz of non-caffenated fluid per day.    S: Patient feels good fetal movement.  Denies regular contractions.  Denies lower extremity edema.  Denies vaginal bleeding.  Does feel tired.  Protonix controlling the GERD.    O:  DTR 2+, UA-Tr ketones, see OB flowsheet

## 2024-05-30 NOTE — PATIENT INSTRUCTIONS
Follow-up in 2 weeks    Hemoglobin today.    Due to BMI being over 30 at the start of pregnancy we will going to get a biophysical profile ultrasound with growth to be done at 36 weeks.  That is scheduled.    If baby is not headdown at that visit we will refer to OB/GYN to discuss version.    At the 36-week visit we will check a hemoglobin GBS.    Starting at 36 weeks we will have a weekly nonstress test.    Recommend 64-96 oz of non-caffenated fluid per day.

## 2024-06-10 ENCOUNTER — PRENATAL OFFICE VISIT (OUTPATIENT)
Dept: FAMILY MEDICINE | Facility: CLINIC | Age: 34
End: 2024-06-10
Payer: COMMERCIAL

## 2024-06-10 VITALS
DIASTOLIC BLOOD PRESSURE: 78 MMHG | BODY MASS INDEX: 37.63 KG/M2 | SYSTOLIC BLOOD PRESSURE: 123 MMHG | RESPIRATION RATE: 18 BRPM | TEMPERATURE: 97.7 F | WEIGHT: 253 LBS | HEART RATE: 103 BPM | OXYGEN SATURATION: 97 %

## 2024-06-10 DIAGNOSIS — Z34.83 ENCOUNTER FOR SUPERVISION OF OTHER NORMAL PREGNANCY IN THIRD TRIMESTER: Primary | ICD-10-CM

## 2024-06-10 PROCEDURE — 99207 PR PRENATAL VISIT: CPT | Performed by: FAMILY MEDICINE

## 2024-06-10 PROCEDURE — 81003 URINALYSIS AUTO W/O SCOPE: CPT | Performed by: FAMILY MEDICINE

## 2024-06-10 ASSESSMENT — PATIENT HEALTH QUESTIONNAIRE - PHQ9: SUM OF ALL RESPONSES TO PHQ QUESTIONS 1-9: 0

## 2024-06-10 NOTE — PROGRESS NOTES
A/P: 34-year-old  2 para 1-0-0-1 female at 34-1/7 weeks gestation.  O+ blood type.  Advanced maternal age.  Obese.  Next visit will be group B strep, hemoglobin and handheld ultrasound to ensure baby is head down if needed because biophysical profile ultrasound set for 36 weeks.  Follow-up in 2 weeks then weekly.  Please come 10 minutes before your appointment time for rooming.    S: Patient feels good fetal movement.  Occasional singular cramps.  Denies regular contractions.  Denies lower extremity edema.  Denies vaginal bleeding.  Gets a side stitch with walking.  She is staying hydrated.  She feels baby has turned into the headdown position.  She is having some pelvic pressure.    O:  DTR 2+, UA-mod LE, see OB flowsheet

## 2024-06-10 NOTE — PATIENT INSTRUCTIONS
Next visit will be group B strep, hemoglobin and handheld ultrasound to ensure baby is head down if needed because biophysical profile ultrasound set for 36 weeks.    Follow-up in 2 weeks then weekly.    Please come 10 minutes before your appointment time for rooming.

## 2024-06-16 ENCOUNTER — HEALTH MAINTENANCE LETTER (OUTPATIENT)
Age: 34
End: 2024-06-16

## 2024-06-25 ENCOUNTER — HOSPITAL ENCOUNTER (OUTPATIENT)
Dept: ULTRASOUND IMAGING | Facility: HOSPITAL | Age: 34
Discharge: HOME | End: 2024-06-25
Attending: FAMILY MEDICINE | Admitting: FAMILY MEDICINE
Payer: COMMERCIAL

## 2024-06-25 DIAGNOSIS — E66.811 CLASS 1 OBESITY DUE TO EXCESS CALORIES WITHOUT SERIOUS COMORBIDITY IN ADULT, UNSPECIFIED BMI: ICD-10-CM

## 2024-06-25 DIAGNOSIS — E66.09 CLASS 1 OBESITY DUE TO EXCESS CALORIES WITHOUT SERIOUS COMORBIDITY IN ADULT, UNSPECIFIED BMI: ICD-10-CM

## 2024-06-25 PROCEDURE — 76816 OB US FOLLOW-UP PER FETUS: CPT

## 2024-06-25 PROCEDURE — 76819 FETAL BIOPHYS PROFIL W/O NST: CPT

## 2024-06-27 ENCOUNTER — PRENATAL OFFICE VISIT (OUTPATIENT)
Dept: FAMILY MEDICINE | Facility: CLINIC | Age: 34
End: 2024-06-27
Payer: COMMERCIAL

## 2024-06-27 VITALS
WEIGHT: 251.4 LBS | HEART RATE: 97 BPM | RESPIRATION RATE: 16 BRPM | TEMPERATURE: 98.3 F | DIASTOLIC BLOOD PRESSURE: 76 MMHG | BODY MASS INDEX: 37.4 KG/M2 | SYSTOLIC BLOOD PRESSURE: 107 MMHG | OXYGEN SATURATION: 98 %

## 2024-06-27 DIAGNOSIS — Z34.83 ENCOUNTER FOR SUPERVISION OF OTHER NORMAL PREGNANCY IN THIRD TRIMESTER: Primary | ICD-10-CM

## 2024-06-27 LAB
ALBUMIN UR-MCNC: NEGATIVE MG/DL
APPEARANCE UR: CLEAR
BILIRUB UR QL STRIP: NEGATIVE
COLOR UR AUTO: YELLOW
GLUCOSE UR STRIP-MCNC: NEGATIVE MG/DL
HGB BLD-MCNC: 11.4 G/DL (ref 11.7–15.7)
HGB UR QL STRIP: NEGATIVE
KETONES UR STRIP-MCNC: NEGATIVE MG/DL
LEUKOCYTE ESTERASE UR QL STRIP: ABNORMAL
NITRATE UR QL: NEGATIVE
PH UR STRIP: 5.5 [PH] (ref 5–8)
SP GR UR STRIP: 1.01 (ref 1–1.03)
UROBILINOGEN UR STRIP-ACNC: 0.2 E.U./DL

## 2024-06-27 PROCEDURE — 81003 URINALYSIS AUTO W/O SCOPE: CPT | Performed by: FAMILY MEDICINE

## 2024-06-27 PROCEDURE — 99207 PR PRENATAL VISIT: CPT | Performed by: FAMILY MEDICINE

## 2024-06-27 PROCEDURE — 87653 STREP B DNA AMP PROBE: CPT | Performed by: FAMILY MEDICINE

## 2024-06-27 PROCEDURE — 36415 COLL VENOUS BLD VENIPUNCTURE: CPT | Performed by: FAMILY MEDICINE

## 2024-06-27 PROCEDURE — 85018 HEMOGLOBIN: CPT | Performed by: FAMILY MEDICINE

## 2024-06-27 NOTE — PATIENT INSTRUCTIONS
If you are having any contractions 3 or more an hour and you are before 37 weeks pregnant I do want you monitored at labor and delivery.      After 37 weeks to watch for labor which be francisco every 10 to 15 minutes or your water breaks with a gush or slow constant trickle.    Your biophysical profile ultrasound was normal at 8 out of 8.  They said normal interval growth.  They said 2365 g which should be about 5 pounds 3 ounces plus or minus a pound.    Today we did the group B strep swab.  If that is positive we would give antibiotics in labor.    Today we are doing the hemoglobin.    Follow-up in 1 week.

## 2024-06-27 NOTE — PROGRESS NOTES
A/P: 34-year-old  2 para 1-0-0-1 female at 36-4/7 weeks gestation.  O+ blood type.  Obese.  Possibly large fetus.    If you are having any contractions 3 or more an hour and you are before 37 weeks pregnant I do want you monitored at labor and delivery.    After 37 weeks to watch for labor which be francisco every 10 to 15 minutes or your water breaks with a gush or slow constant trickle.  Your biophysical profile ultrasound was normal at 8 out of 8.  They said normal interval growth.  They said 2365 g which should be about 5 pounds 3 ounces plus or minus a pound.  Today we did the group B strep swab.  If that is positive we would give antibiotics in labor.  Today we are doing the hemoglobin.  Follow-up in 1 week.    S: Patient feels good fetal movement.  Denies regular contractions.  Denies lower extremity edema.  Denies vaginal bleeding.  Having some Chuckie Armando contractions, like a period cramp, after she has been up for a while, max of a couple in a day sand paced out.  36-week biophysical profile ultrasound showed 8 out of 8, vertex and 90th percentile for weight.  When we calculate the weight baby is 5 pounds 3 ounces   a pound.    18 month old daughter born vaginally at 40 6/7 with elective induction.  Also for post dates and mild gestational hypertension.  Cord was avulsed and some manual extraction of placenta performed, stage 1 postpartum hemorrhage.    O:  DTR 2+, DTR 2+, UA-Tr LE, see OB flowsheet  NST interpretation: reactive. Test duration 12 min. Baseline  bpm. Baseline variability: moderate. Accelerations: present. Decelerations: absent. Uterine activity: absent

## 2024-06-28 LAB — GP B STREP DNA SPEC QL NAA+PROBE: POSITIVE

## 2024-07-01 ENCOUNTER — PRENATAL OFFICE VISIT (OUTPATIENT)
Dept: FAMILY MEDICINE | Facility: CLINIC | Age: 34
End: 2024-07-01
Payer: COMMERCIAL

## 2024-07-01 VITALS
BODY MASS INDEX: 37.99 KG/M2 | OXYGEN SATURATION: 97 % | RESPIRATION RATE: 18 BRPM | HEART RATE: 103 BPM | SYSTOLIC BLOOD PRESSURE: 123 MMHG | TEMPERATURE: 98.3 F | DIASTOLIC BLOOD PRESSURE: 80 MMHG | WEIGHT: 255.4 LBS

## 2024-07-01 DIAGNOSIS — Z34.83 ENCOUNTER FOR SUPERVISION OF OTHER NORMAL PREGNANCY IN THIRD TRIMESTER: Primary | ICD-10-CM

## 2024-07-01 LAB
ALBUMIN UR-MCNC: NEGATIVE MG/DL
APPEARANCE UR: CLEAR
BILIRUB UR QL STRIP: NEGATIVE
COLOR UR AUTO: YELLOW
GLUCOSE UR STRIP-MCNC: NEGATIVE MG/DL
HGB UR QL STRIP: NEGATIVE
KETONES UR STRIP-MCNC: ABNORMAL MG/DL
LEUKOCYTE ESTERASE UR QL STRIP: ABNORMAL
NITRATE UR QL: NEGATIVE
PH UR STRIP: 6.5 [PH] (ref 5–8)
SP GR UR STRIP: 1.02 (ref 1–1.03)
UROBILINOGEN UR STRIP-ACNC: 0.2 E.U./DL

## 2024-07-01 PROCEDURE — 99207 PR PRENATAL VISIT: CPT | Performed by: FAMILY MEDICINE

## 2024-07-01 PROCEDURE — 81003 URINALYSIS AUTO W/O SCOPE: CPT | Performed by: FAMILY MEDICINE

## 2024-07-01 NOTE — PATIENT INSTRUCTIONS
You are group B strep positive.  We will give antibiotics in labor.  Please call the hospital if you have contractions every 10 to 15 minutes or your water breaks with a gush or slow constant trickle.  Do not wait it out at home if you think you are in labor call and head in so we can get the antibiotics going ideally at least 4 hours before delivery.    [facility] Labor and Delivery is [phone].    For back:  Tylenol, ice and heat are safe.  No Lidocaine patch or topical Voltaren.    Breast pump supplies ordered.    Follow-up in 1 week.

## 2024-07-01 NOTE — PROGRESS NOTES
A/P: 34-year-old  2 para 1-0-0-1 female at 37-1/7 weeks gestation.  O+ blood type.  Positive group B strep.  Obese.  Surprise gender.  You are group B strep positive.  We will give antibiotics in labor.  Please call the hospital if you have contractions every 10 to 15 minutes or your water breaks with a gush or slow constant trickle.  Do not wait it out at home if you think you are in labor call and head in so we can get the antibiotics going ideally at least 4 hours before delivery.  [facility] Labor and Delivery is [phone].  For back:  Tylenol, ice and heat are safe.  No Lidocaine patch or topical Voltaren.  Breast pump supplies ordered.  Follow-up in 1 week.    S: Patient feels good fetal movement.  Denies regular contractions.  Denies lower extremity edema.  Denies vaginal bleeding.  Patient has a breast pump at home but would like an order for the tubing and supplies.  Back pain a few days ago after lifting something heavy.  She is just wondering what would be safe medications or topicals to use.    O:  DTR 2+, UA-Tr ketone, Sm LE, see OB flowsheet

## 2024-07-08 ENCOUNTER — PRENATAL OFFICE VISIT (OUTPATIENT)
Dept: FAMILY MEDICINE | Facility: CLINIC | Age: 34
End: 2024-07-08
Payer: COMMERCIAL

## 2024-07-08 VITALS
HEART RATE: 92 BPM | SYSTOLIC BLOOD PRESSURE: 126 MMHG | BODY MASS INDEX: 38.29 KG/M2 | DIASTOLIC BLOOD PRESSURE: 85 MMHG | WEIGHT: 257.4 LBS

## 2024-07-08 DIAGNOSIS — N94.89 UTERINE PAIN: ICD-10-CM

## 2024-07-08 DIAGNOSIS — R03.0 ELEVATED BP WITHOUT DIAGNOSIS OF HYPERTENSION: ICD-10-CM

## 2024-07-08 DIAGNOSIS — Z34.83 ENCOUNTER FOR SUPERVISION OF OTHER NORMAL PREGNANCY IN THIRD TRIMESTER: Primary | ICD-10-CM

## 2024-07-08 LAB
ALBUMIN UR-MCNC: NEGATIVE MG/DL
APPEARANCE UR: CLEAR
BILIRUB UR QL STRIP: NEGATIVE
COLOR UR AUTO: YELLOW
GLUCOSE UR STRIP-MCNC: NEGATIVE MG/DL
HGB BLD-MCNC: 11.6 G/DL (ref 11.7–15.7)
HGB UR QL STRIP: NEGATIVE
KETONES UR STRIP-MCNC: ABNORMAL MG/DL
LEUKOCYTE ESTERASE UR QL STRIP: ABNORMAL
NITRATE UR QL: NEGATIVE
PH UR STRIP: 6.5 [PH] (ref 5–8)
PLATELET # BLD AUTO: 349 10E3/UL (ref 150–450)
SP GR UR STRIP: 1.01 (ref 1–1.03)
UROBILINOGEN UR STRIP-ACNC: 0.2 E.U./DL

## 2024-07-08 PROCEDURE — 85018 HEMOGLOBIN: CPT | Performed by: FAMILY MEDICINE

## 2024-07-08 PROCEDURE — 82565 ASSAY OF CREATININE: CPT | Performed by: FAMILY MEDICINE

## 2024-07-08 PROCEDURE — 84460 ALANINE AMINO (ALT) (SGPT): CPT | Performed by: FAMILY MEDICINE

## 2024-07-08 PROCEDURE — 81003 URINALYSIS AUTO W/O SCOPE: CPT | Performed by: FAMILY MEDICINE

## 2024-07-08 PROCEDURE — 36415 COLL VENOUS BLD VENIPUNCTURE: CPT | Performed by: FAMILY MEDICINE

## 2024-07-08 PROCEDURE — 85049 AUTOMATED PLATELET COUNT: CPT | Performed by: FAMILY MEDICINE

## 2024-07-08 PROCEDURE — 99207 PR PRENATAL VISIT: CPT | Performed by: FAMILY MEDICINE

## 2024-07-08 PROCEDURE — 84450 TRANSFERASE (AST) (SGOT): CPT | Performed by: FAMILY MEDICINE

## 2024-07-08 PROCEDURE — 84156 ASSAY OF PROTEIN URINE: CPT | Performed by: FAMILY MEDICINE

## 2024-07-08 ASSESSMENT — PATIENT HEALTH QUESTIONNAIRE - PHQ9
SUM OF ALL RESPONSES TO PHQ QUESTIONS 1-9: 0
10. IF YOU CHECKED OFF ANY PROBLEMS, HOW DIFFICULT HAVE THESE PROBLEMS MADE IT FOR YOU TO DO YOUR WORK, TAKE CARE OF THINGS AT HOME, OR GET ALONG WITH OTHER PEOPLE: NOT DIFFICULT AT ALL
SUM OF ALL RESPONSES TO PHQ QUESTIONS 1-9: 0

## 2024-07-08 NOTE — LETTER
Abimbola Babcock  1990    7-8-24    2 to whom it may concern:    This patient is 38-1/7 weeks gestation.  It is medically recommended that she stop work secondary to pain and pregnancy complications.  Please feel free to contact me with any questions or concerns.          Christine Sood MD

## 2024-07-08 NOTE — PATIENT INSTRUCTIONS
OB ultrasound ordered for uterine pain and growth and biophysical profile.  Can call [phone] to set it up.    Weekly NSTs with history of obesity.    HELLP labs today.    If you are having regular contractions every 10-15 minutes, or your water breaks (with a gush or slow constant trickle), call the hospital.  When you are admitted to the hospital point out that your group B strep positive.    Also call the hospital if you are not feeling well or you have not felt good fetal movement.    Seeing Dr. Olivo in 1 week and Dr. Sood in 2 weeks.    If not delivered before 40 weeks would recommend postdates testing at 40 weeks with biophysical profile ultrasound and then twice weekly NSTs.    Wrote a work note and sent it to your MyChart stating that it is medically recommended for you to stop working at this time.

## 2024-07-08 NOTE — PROGRESS NOTES
A/P: 34-year-old  2 para 1-0-0-1 female at 38 1/7 weeks gestation.  O+ blood type.  Obese.  Positive group B strep.  Surprise gender.  Right abdomen / uterine pain.    OB ultrasound ordered for uterine pain and growth and biophysical profile.  Can call [phone] to set it up.  Weekly NSTs with history of obesity.  HELLP labs today.  If you are having regular contractions every 10-15 minutes, or your water breaks (with a gush or slow constant trickle), call the hospital.  When you are admitted to the hospital point out that your group B strep positive.  Also call the hospital if you are not feeling well or you have not felt good fetal movement.  Seeing Dr. Olivo in 1 week and Dr. Sood in 2 weeks.  If not delivered before 40 weeks would recommend postdates testing at 40 weeks with biophysical profile ultrasound and then twice weekly NSTs.  Wrote a work note and sent it to your MyChart stating that it is medically recommended for you to stop working at this time.    S: Patient feels good fetal movement.  Denies regular contractions.  Denies lower extremity edema.  Denies vaginal bleeding.  Having right sided pain (points to right side of abdomen/uterus) like a runners stich with activitiy, shows up sooner with activity then it used to and sharper pain than before.  Staying hydrated.  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  It is keeping her from being able to do her job.  One day last week, threw up many times then ok. Otherwise GERD is ok.  Blood pressure has been creeping up.  Normally it had been in the 1 teens over 70s and now she is getting mid 120s over mid 80s.  No headache.  No right upper quadrant abdominal pain.  No lower extremity edema.  She did have gestational hypertension at the end of her last pregnancy.    O:  DTR 2+, UA Tr ketones, Tr LE, see OB flowsheet  NST interpretation: reactive. Test duration 20 min. Baseline  bpm. Baseline  variability: moderate. Accelerations: present. Decelerations: absent. Uterine activity: Appears to have some uterine irritability.        Answers submitted by the patient for this visit:  Patient Health Questionnaire (Submitted on 7/8/2024)  If you checked off any problems, how difficult have these problems made it for you to do your work, take care of things at home, or get along with other people?: Not difficult at all  PHQ9 TOTAL SCORE: 0

## 2024-07-09 ENCOUNTER — HOSPITAL ENCOUNTER (OUTPATIENT)
Dept: ULTRASOUND IMAGING | Facility: CLINIC | Age: 34
Discharge: HOME | End: 2024-07-09
Attending: FAMILY MEDICINE | Admitting: FAMILY MEDICINE
Payer: COMMERCIAL

## 2024-07-09 DIAGNOSIS — N94.89 UTERINE PAIN: ICD-10-CM

## 2024-07-09 DIAGNOSIS — Z34.83 ENCOUNTER FOR SUPERVISION OF OTHER NORMAL PREGNANCY IN THIRD TRIMESTER: ICD-10-CM

## 2024-07-09 LAB
ALBUMIN MFR UR ELPH: <6 MG/DL
ALT SERPL W P-5'-P-CCNC: 45 U/L (ref 0–50)
AST SERPL W P-5'-P-CCNC: 29 U/L (ref 0–45)
CREAT SERPL-MCNC: 0.41 MG/DL (ref 0.51–0.95)
CREAT UR-MCNC: 21.8 MG/DL
EGFRCR SERPLBLD CKD-EPI 2021: >90 ML/MIN/1.73M2
PROT/CREAT 24H UR: NORMAL MG/G{CREAT}

## 2024-07-09 PROCEDURE — 76816 OB US FOLLOW-UP PER FETUS: CPT

## 2024-07-09 PROCEDURE — 76819 FETAL BIOPHYS PROFIL W/O NST: CPT

## 2024-07-10 ENCOUNTER — MYC MEDICAL ADVICE (OUTPATIENT)
Dept: FAMILY MEDICINE | Facility: CLINIC | Age: 34
End: 2024-07-10

## 2024-07-11 NOTE — TELEPHONE ENCOUNTER
Please call [facility] labor and delivery and set up a nonstress test for this Sunday, July 14.  Diagnosis is maternal obesity and possible fetal macrosomia.  Christine Sood MD

## 2024-07-11 NOTE — TELEPHONE ENCOUNTER
Was able to call and schedule patient at  for Sunday 7/14 at 12pm. Called patient and informed her of this.

## 2024-07-14 ENCOUNTER — HOSPITAL ENCOUNTER (OUTPATIENT)
Facility: CLINIC | Age: 34
Discharge: HOME | End: 2024-07-14
Attending: FAMILY MEDICINE | Admitting: FAMILY MEDICINE
Payer: COMMERCIAL

## 2024-07-14 ASSESSMENT — ACTIVITIES OF DAILY LIVING (ADL)
ADLS_ACUITY_SCORE: 18
ADLS_ACUITY_SCORE: 35

## 2024-07-14 NOTE — PROGRESS NOTES
Data: Patient assessed in the [facility] for  scheduled NST . Cervical exam deferred. Membranes intact. Contractions are present occasionally. See flowsheets for fetal assessment documentation.     Action: Presumed adequate fetal oxygenation documented. Discharge instructions reviewed. Patient instructed to report change in fetal movement, vaginal leaking of fluid or bleeding, abdominal pain, or any concerns related to the pregnancy to provider/clinic.      Response: Orders to discharge home per Dr. Olivo. Patient verbalized understanding of education and agreement with plan. Discharged to home at 1320.

## 2024-07-14 NOTE — PROGRESS NOTES
Data: Patient presented to [facility]: 2024 12:00 PM.  Reason for maternal/fetal assessment is  NST . Patient reports no signs of labor. Patient denies uterine contractions, leaking of vaginal fluid/rupture of membranes, vaginal bleeding, abdominal pain, pelvic pressure, nausea, vomiting, headache, visual disturbances, epigastric or RUQ pain, significant edema. Patient reports fetal movement is normal. Patient is a 39w0d .  Prenatal record reviewed. Pregnancy has been uncomplicated.    Vital signs wnl. Support person is present.     Action: Verbal consent for EFM. Triage assessment completed.     Response: Patient verbalized agreement with plan. Will contact Dr. Olivo with update and further orders.

## 2024-07-14 NOTE — DISCHARGE INSTRUCTIONS
Learning About When to Call Your Doctor During Pregnancy (After 20 Weeks)  Overview  It's common to have concerns about what might be a problem when you're pregnant. Most pregnancies don't have any serious problems. But it's still important to know when to call your doctor if you have certain symptoms or signs of labor.  These are general suggestions. Your doctor may give you some more information about when to call.  When to call your doctor (after 20 weeks)  Call 911  anytime you think you may need emergency care. For example, call if:  You have severe vaginal bleeding. This means you are soaking through a pad each hour for 2 or more hours.  You have sudden, severe pain in your belly.  You have chest pain, are short of breath, or cough up blood.  You passed out (lost consciousness).  You have a seizure.  You see or feel the umbilical cord.  You think you are about to deliver your baby and can't make it safely to the hospital or birthing center.  Call your doctor now or seek immediate medical care if:  You have vaginal bleeding.  You have belly pain.  You have a fever.  You are dizzy or lightheaded, or you feel like you may faint.  You have signs of a blood clot in your leg (called a deep vein thrombosis), such as:  Pain in the calf, back of the knee, thigh, or groin.  Swelling in your leg or groin.  A color change on the leg or groin. The skin may be reddish or purplish, depending on your usual skin color.  You have symptoms of preeclampsia, such as:  Sudden swelling of your face, hands, or feet.  New vision problems (such as dimness, blurring, or seeing spots).  A severe headache.  You have a sudden release of fluid from your vagina. (You think your water broke.)  You've been having regular contractions for an hour. This means that you've had at least 6 contractions within 1 hour, even after you change your position and drink fluids.  You notice that your baby has stopped moving or is moving less than  "normal.  You have signs of heart failure, such as:  New or increased shortness of breath.  New or worse swelling in your legs, ankles, or feet.  Sudden weight gain, such as more than 2 to 3 pounds in a day or 5 pounds in a week.  Feeling so tired or weak that you cannot do your usual activities.  You have symptoms of a urinary tract infection. These may include:  Pain or burning when you urinate.  A frequent need to urinate without being able to pass much urine.  Pain in the flank, which is just below the rib cage and above the waist on either side of the back.  Blood in your urine.  Watch closely for changes in your health, and be sure to contact your doctor if:  You have vaginal discharge that smells bad.  You feel sad, anxious, or hopeless for more than a few days.  You have skin changes, such as a rash, itching, or a yellow color to your skin.  You have other concerns about your pregnancy.  If you have labor signs at 37 weeks or more  If you have signs of labor at 37 weeks or more, your doctor may tell you to call when your labor becomes more active. Symptoms of active labor include:  Contractions that are regular.  Contractions that are less than 5 minutes apart.  Contractions that are hard to talk through.  Follow-up care is a key part of your treatment and safety. Be sure to make and go to all appointments, and call your doctor if you are having problems. It's also a good idea to know your test results and keep a list of the medicines you take.  Where can you learn more?  Go to https://www.[employer].net/patiented  Enter N531 in the search box to learn more about \"Learning About When to Call Your Doctor During Pregnancy (After 20 Weeks).\"  Current as of: July 10, 2023               Content Version: 14.0    8838-5461 [employer].   Care instructions adapted under license by your healthcare professional. If you have questions about a medical condition or this instruction, always ask your healthcare " professional. [employer] disclaims any warranty or liability for your use of this information.

## 2024-07-14 NOTE — PROGRESS NOTES
Updated Dr. Olivo and patient's NST. Patient has a reactive NST and provider is okay with discharging patient at this time. Patient has a follow up with Dr. Olivo later this week.

## 2024-07-16 ENCOUNTER — PRENATAL OFFICE VISIT (OUTPATIENT)
Dept: FAMILY MEDICINE | Facility: CLINIC | Age: 34
End: 2024-07-16
Payer: COMMERCIAL

## 2024-07-16 VITALS
OXYGEN SATURATION: 99 % | SYSTOLIC BLOOD PRESSURE: 111 MMHG | HEIGHT: 69 IN | HEART RATE: 117 BPM | BODY MASS INDEX: 38.66 KG/M2 | TEMPERATURE: 97.9 F | DIASTOLIC BLOOD PRESSURE: 77 MMHG | RESPIRATION RATE: 24 BRPM | WEIGHT: 261 LBS

## 2024-07-16 DIAGNOSIS — O99.213 OBESITY AFFECTING PREGNANCY IN THIRD TRIMESTER, UNSPECIFIED OBESITY TYPE: ICD-10-CM

## 2024-07-16 DIAGNOSIS — O09.93 SUPERVISION OF HIGH RISK PREGNANCY IN THIRD TRIMESTER: Primary | ICD-10-CM

## 2024-07-16 PROBLEM — I10 ESSENTIAL HYPERTENSION, BENIGN: Status: RESOLVED | Noted: 2021-03-22 | Resolved: 2024-07-16

## 2024-07-16 PROBLEM — F10.939 ALCOHOL WITHDRAWAL (H): Status: RESOLVED | Noted: 2021-10-02 | Resolved: 2024-07-16

## 2024-07-16 PROBLEM — R00.0 TACHYCARDIA: Status: RESOLVED | Noted: 2021-03-21 | Resolved: 2024-07-16

## 2024-07-16 PROBLEM — F10.10 ALCOHOL ABUSE: Status: RESOLVED | Noted: 2021-10-02 | Resolved: 2024-07-16

## 2024-07-16 PROCEDURE — 59025 FETAL NON-STRESS TEST: CPT | Performed by: FAMILY MEDICINE

## 2024-07-16 PROCEDURE — 81003 URINALYSIS AUTO W/O SCOPE: CPT | Performed by: FAMILY MEDICINE

## 2024-07-16 PROCEDURE — 99207 PR COMPLICATED OB VISIT: CPT | Performed by: FAMILY MEDICINE

## 2024-07-16 NOTE — PROGRESS NOTES
Patient reports that she has been feeling well.  Reviewed her last BPP and NST, which were normal.  She denies contractions, LOF or bleeding.  She notes good fetal movement.  Discussed the option for elective IOL as her Nichols score is about 6.  She would like to wait for now, but will let me know if she changes her mind.  NST done today is reactive with moderate variability and accels, no decelerations.  BPP ordered to be done in 3 days.  She has an appointment with her PCP Dr. Sood next week.  Membranes were swept today at patient's request.  Concerns: None  Doing well.  No concerns today.  No vaginal bleeding, LOF, contractions.  No HA, RUQ pain, N/V, visual changes.  Cervix is 1.5/75%/-2, posterior, soft.  Cephalic position confirmed by Leopold maneuvers and cervical exam.  Discussed indications, risks, complications and failure rate of inductions.  Discussed signs of labor and when to call or come in.  Discussed kick counts and fetal movement.  NST done today and was reactive.  Biophysical profile ordered.  Reportable signs and symptoms discussed.  Labor precautions discussed.  RTC 1 week.    Esther Olivo MD

## 2024-07-16 NOTE — PATIENT INSTRUCTIONS
"Week 39 of Your Pregnancy: Care Instructions     babies can look different than what you see in pictures or movies. Their heads can be a strange shape right after birth. And they may have swollen eyes and red marks on their faces.    You can still get pregnant even if you are breastfeeding. If you don't want to get pregnant, talk to your doctor about birth control.  Tips for week 39 of pregnancy  If you plan to breastfeed, get prepared.     Continue to eat healthy foods.  Keep taking your prenatal vitamins during breastfeeding if your doctor recommends it.  Talk to your doctor before taking any medicines or supplements.  Choose the right birth control for you.     Intrauterine devices (IUDs) are placed in the uterus. Sometimes the IUD can be placed right after giving birth. They work for years.  Hormonal implants are placed under the skin of the arm. They also work for years.  Depo-Provera is a shot. You get it every 3 months.  Birth control pills can be used. They're taken every day.  Tubal ligation (tying your tubes) and vasectomy are surgeries. They're permanent.  Diaphragms, spermicide, and condoms must be used each time you have sex. If you used a diaphragm before, you should get refitted after the baby is born.  A birth control patch or ring can be used. These just can't be started until several weeks after you give birth.  Follow-up care is a key part of your treatment and safety. Be sure to make and go to all appointments, and call your doctor if you are having problems. It's also a good idea to know your test results and keep a list of the medicines you take.  Where can you learn more?  Go to https://www.[employer].net/patiented  Enter A811 in the search box to learn more about \"Week 39 of Your Pregnancy: Care Instructions.\"  Current as of: July 10, 2023               Content Version: 14.0    3632-2861 [employer], [employer].   Care instructions adapted under license by your healthcare " "professional. If you have questions about a medical condition or this instruction, always ask your healthcare professional. Healthwise, [facility] disclaims any warranty or liability for your use of this information.      Labor Induction  What You Need to Know  What is labor induction?  In most cases, it is best to go into labor naturally. When labor does not start on its own, we may use medicine or other methods to start (induce) labor. This is called induction, which:  Helps the uterus contract  Thins, softens and opens the cervix (opening of the uterus)  When is induction used?  There are two types of induction.  Medical induction may be done to protect the health of the parent or baby if:  There are medical concerns for you or your baby.  You haven't had your baby by 41 weeks.  Induction for non-medical reasons may be done at 39 weeks or later if:  You live far from the hospital.  You've been having contractions for many days.  You've given birth quickly in the past.   We will not perform an induction for non-medical reasons before 39 weeks. Studies show that babies born before 39 weeks may struggle with breathing, feeding, sleeping and staying warm. They are more likely to have health problems and may need to stay in the hospital longer. If we start your labor for medical reasons, the benefits will outweigh these risks. We will talk to you about your risks, benefits and alternatives (other options) before we start your labor.  How is induction done?  We may start your labor by doing one or more of the following:  We may need to help your cervix soften and open (sometimes called \"ripening\") to prepare it for labor. There are two ways to do this:  Medicines--these may be in the form of pills that you swallow or medicines that are put into the vagina next to the cervix.  Mechanical--using either a single or double balloon. These are small balloons that are attached to tubes that go up inside the cervix. The " "balloons are then filled with water to put pressure on the cervix and help the cervix soften and open. Depending on the type of balloon used, you may be allowed to go home after it is placed.  After your cervix is ready:  We may give you medicine through an IV (a small tube placed in your vein). This medicine is called Pitocin. It helps the muscles of your uterus to contract.  We may make a small opening in your bag of water (the sac around your baby). This is called an amniotomy. Sometimes called \"breaking the water\". It may help your uterus contract and your cervix open.  What might happen if my labor is induced?  Some of this depends on how your labor is started and how your body responds. Your labor may be more complicated. You and your baby may need more medical treatments. In general:  You may not go into labor right away. If so, we may send you home with follow-up instructions.  It will be important to monitor you and your baby during the induction.  You may not be able to move around during labor. You will have two belts with monitors attached to your belly. These allow the nurse to watch your contractions and your baby's heart rate.  Your labor may take longer than if you went into labor on your own. It might take more than one day.  If you need cervical ripening, it is important to know that it may be many hours (even days) until delivery happens.  Cervical ripening can be slow and require several doses before your body is ready to labor.  A long labor may increase the risk of infection in mother and baby.  Your labor may not progress as planned. This could lead to a  birth.  Can I plan the date of my delivery?  After 39 weeks, you may ask about planning your delivery date. This is only an option if your body--and your baby--are ready. To find out, we will check your cervix and your baby's heart rate.   If you are ready to be induced, we will give you a date and time to come to the hospital. If " many patients are in labor that day, we may need to start your labor at another time.  If you are not ready, we will not start your labor. It will be safer for your baby to come on its own.  What else do I need to know?  Before you have an induction, make sure you understand the reasons, risks and benefits. Ask your doctor or nurse-midwife:  Why do I need to be induced?  What are the risks to my baby?  How will you start my labor?  How will you know if my baby is ready to be born?  How will you know if my body is ready to give birth?  Where can I get more information?  To learn more about induction, you may visit these websites:  The American College of Nurse-Midwives:  www.mymidwife.org  The American College of Obstetricians and Gynecologists: www.acog.org  Childbirth Connection:  www.childbirthconnection.org  March of Dimes: www.marchofdimes.com  Association of Women's Health, Obstetrics, and  Nursing  www.wpppzc3dli.org/go-the-full-40&#047;  For informational purposes only. Not to replace the advice of your health care provider. Copyright   2008 [employer]. All rights reserved. Clinically reviewed by the  System Operations [employer] team. [employer] 906392 - REV .

## 2024-07-19 ENCOUNTER — HOSPITAL ENCOUNTER (OUTPATIENT)
Dept: ULTRASOUND IMAGING | Facility: HOSPITAL | Age: 34
Discharge: HOME | End: 2024-07-19
Attending: FAMILY MEDICINE | Admitting: FAMILY MEDICINE
Payer: COMMERCIAL

## 2024-07-19 PROCEDURE — 76819 FETAL BIOPHYS PROFIL W/O NST: CPT

## 2024-07-20 PROBLEM — O09.93 SUPERVISION OF HIGH RISK PREGNANCY IN THIRD TRIMESTER: Status: ACTIVE | Noted: 2024-07-20

## 2024-07-20 PROBLEM — O99.213 OBESITY AFFECTING PREGNANCY IN THIRD TRIMESTER, UNSPECIFIED OBESITY TYPE: Status: ACTIVE | Noted: 2024-07-20

## 2024-07-23 ENCOUNTER — ANESTHESIA (OUTPATIENT)
Dept: OBGYN | Facility: CLINIC | Age: 34
End: 2024-07-23
Payer: COMMERCIAL

## 2024-07-23 ENCOUNTER — HOSPITAL ENCOUNTER (INPATIENT)
Facility: CLINIC | Age: 34
LOS: 2 days | Discharge: HOME | End: 2024-07-25
Attending: FAMILY MEDICINE | Admitting: FAMILY MEDICINE
Payer: COMMERCIAL

## 2024-07-23 ENCOUNTER — ANESTHESIA EVENT (OUTPATIENT)
Dept: OBGYN | Facility: CLINIC | Age: 34
End: 2024-07-23
Payer: COMMERCIAL

## 2024-07-23 DIAGNOSIS — Z3A.40 40 WEEKS GESTATION OF PREGNANCY: Primary | ICD-10-CM

## 2024-07-23 PROBLEM — Z34.90 ENCOUNTER FOR INDUCTION OF LABOR: Status: ACTIVE | Noted: 2024-07-23

## 2024-07-23 LAB
ABO/RH(D): NORMAL
ANTIBODY SCREEN: NEGATIVE
HGB BLD-MCNC: 11 G/DL (ref 11.7–15.7)
SPECIMEN EXPIRATION DATE: NORMAL
T PALLIDUM AB SER QL: NONREACTIVE

## 2024-07-23 PROCEDURE — 722N000001 HC LABOR CARE VAGINAL DELIVERY SINGLE

## 2024-07-23 PROCEDURE — 258N000003 HC RX IP 258 OP 636

## 2024-07-23 PROCEDURE — 250N000009 HC RX 250

## 2024-07-23 PROCEDURE — 3E0R3BZ INTRODUCTION OF ANESTHETIC AGENT INTO SPINAL CANAL, PERCUTANEOUS APPROACH: ICD-10-PCS | Performed by: ANESTHESIOLOGY

## 2024-07-23 PROCEDURE — 120N000001 HC R&B MED SURG/OB

## 2024-07-23 PROCEDURE — 250N000011 HC RX IP 250 OP 636: Performed by: ANESTHESIOLOGY

## 2024-07-23 PROCEDURE — 250N000009 HC RX 250: Performed by: ANESTHESIOLOGY

## 2024-07-23 PROCEDURE — 370N000003 HC ANESTHESIA WARD SERVICE: Performed by: ANESTHESIOLOGY

## 2024-07-23 PROCEDURE — 00HU33Z INSERTION OF INFUSION DEVICE INTO SPINAL CANAL, PERCUTANEOUS APPROACH: ICD-10-PCS | Performed by: ANESTHESIOLOGY

## 2024-07-23 PROCEDURE — 250N000011 HC RX IP 250 OP 636

## 2024-07-23 PROCEDURE — 0UQMXZZ REPAIR VULVA, EXTERNAL APPROACH: ICD-10-PCS | Performed by: FAMILY MEDICINE

## 2024-07-23 PROCEDURE — 0KQM0ZZ REPAIR PERINEUM MUSCLE, OPEN APPROACH: ICD-10-PCS | Performed by: FAMILY MEDICINE

## 2024-07-23 PROCEDURE — 86780 TREPONEMA PALLIDUM: CPT

## 2024-07-23 PROCEDURE — 85018 HEMOGLOBIN: CPT

## 2024-07-23 PROCEDURE — 10907ZC DRAINAGE OF AMNIOTIC FLUID, THERAPEUTIC FROM PRODUCTS OF CONCEPTION, VIA NATURAL OR ARTIFICIAL OPENING: ICD-10-PCS | Performed by: FAMILY MEDICINE

## 2024-07-23 PROCEDURE — 86900 BLOOD TYPING SEROLOGIC ABO: CPT

## 2024-07-23 PROCEDURE — 59400 OBSTETRICAL CARE: CPT | Performed by: FAMILY MEDICINE

## 2024-07-23 PROCEDURE — 36415 COLL VENOUS BLD VENIPUNCTURE: CPT

## 2024-07-23 RX ORDER — METHYLERGONOVINE MALEATE 0.2 MG/ML
200 INJECTION INTRAVENOUS
Status: DISCONTINUED | OUTPATIENT
Start: 2024-07-23 | End: 2024-07-25 | Stop reason: HOSPADM

## 2024-07-23 RX ORDER — ACETAMINOPHEN 325 MG/1
650 TABLET ORAL EVERY 4 HOURS PRN
Status: DISCONTINUED | OUTPATIENT
Start: 2024-07-23 | End: 2024-07-25 | Stop reason: HOSPADM

## 2024-07-23 RX ORDER — BUPIVACAINE HYDROCHLORIDE 2.5 MG/ML
INJECTION, SOLUTION EPIDURAL; INFILTRATION; INTRACAUDAL PRN
Status: DISCONTINUED | OUTPATIENT
Start: 2024-07-23 | End: 2024-07-23

## 2024-07-23 RX ORDER — ONDANSETRON 4 MG/1
4 TABLET, ORALLY DISINTEGRATING ORAL EVERY 6 HOURS PRN
Status: DISCONTINUED | OUTPATIENT
Start: 2024-07-23 | End: 2024-07-23 | Stop reason: HOSPADM

## 2024-07-23 RX ORDER — PROCHLORPERAZINE MALEATE 10 MG
10 TABLET ORAL EVERY 6 HOURS PRN
Status: DISCONTINUED | OUTPATIENT
Start: 2024-07-23 | End: 2024-07-23 | Stop reason: HOSPADM

## 2024-07-23 RX ORDER — DIPHENHYDRAMINE HCL 25 MG
25 CAPSULE ORAL EVERY 6 HOURS PRN
Status: DISCONTINUED | OUTPATIENT
Start: 2024-07-23 | End: 2024-07-25 | Stop reason: HOSPADM

## 2024-07-23 RX ORDER — PENICILLIN G 3000000 [IU]/50ML
3 INJECTION, SOLUTION INTRAVENOUS EVERY 4 HOURS
Status: DISCONTINUED | OUTPATIENT
Start: 2024-07-23 | End: 2024-07-23 | Stop reason: HOSPADM

## 2024-07-23 RX ORDER — MISOPROSTOL 200 UG/1
800 TABLET ORAL
Status: DISCONTINUED | OUTPATIENT
Start: 2024-07-23 | End: 2024-07-25 | Stop reason: HOSPADM

## 2024-07-23 RX ORDER — LOPERAMIDE HCL 2 MG
4 CAPSULE ORAL
Status: DISCONTINUED | OUTPATIENT
Start: 2024-07-23 | End: 2024-07-23 | Stop reason: HOSPADM

## 2024-07-23 RX ORDER — BISACODYL 10 MG
10 SUPPOSITORY, RECTAL RECTAL DAILY PRN
Status: DISCONTINUED | OUTPATIENT
Start: 2024-07-23 | End: 2024-07-25 | Stop reason: HOSPADM

## 2024-07-23 RX ORDER — OXYTOCIN/0.9 % SODIUM CHLORIDE 30/500 ML
340 PLASTIC BAG, INJECTION (ML) INTRAVENOUS CONTINUOUS PRN
Status: DISCONTINUED | OUTPATIENT
Start: 2024-07-23 | End: 2024-07-23 | Stop reason: HOSPADM

## 2024-07-23 RX ORDER — NALOXONE HYDROCHLORIDE 0.4 MG/ML
0.4 INJECTION, SOLUTION INTRAMUSCULAR; INTRAVENOUS; SUBCUTANEOUS
Status: DISCONTINUED | OUTPATIENT
Start: 2024-07-23 | End: 2024-07-23 | Stop reason: HOSPADM

## 2024-07-23 RX ORDER — PROCHLORPERAZINE 25 MG
25 SUPPOSITORY, RECTAL RECTAL EVERY 12 HOURS PRN
Status: DISCONTINUED | OUTPATIENT
Start: 2024-07-23 | End: 2024-07-23 | Stop reason: HOSPADM

## 2024-07-23 RX ORDER — CARBOPROST TROMETHAMINE 250 UG/ML
250 INJECTION, SOLUTION INTRAMUSCULAR
Status: DISCONTINUED | OUTPATIENT
Start: 2024-07-23 | End: 2024-07-25 | Stop reason: HOSPADM

## 2024-07-23 RX ORDER — CARBOPROST TROMETHAMINE 250 UG/ML
250 INJECTION, SOLUTION INTRAMUSCULAR
Status: DISCONTINUED | OUTPATIENT
Start: 2024-07-23 | End: 2024-07-23 | Stop reason: HOSPADM

## 2024-07-23 RX ORDER — LIDOCAINE HYDROCHLORIDE AND EPINEPHRINE 15; 5 MG/ML; UG/ML
INJECTION, SOLUTION EPIDURAL PRN
Status: DISCONTINUED | OUTPATIENT
Start: 2024-07-23 | End: 2024-07-23

## 2024-07-23 RX ORDER — HYDROCORTISONE 25 MG/G
CREAM TOPICAL 3 TIMES DAILY PRN
Status: DISCONTINUED | OUTPATIENT
Start: 2024-07-23 | End: 2024-07-25 | Stop reason: HOSPADM

## 2024-07-23 RX ORDER — METOCLOPRAMIDE 10 MG/1
10 TABLET ORAL EVERY 6 HOURS PRN
Status: DISCONTINUED | OUTPATIENT
Start: 2024-07-23 | End: 2024-07-23 | Stop reason: HOSPADM

## 2024-07-23 RX ORDER — OXYTOCIN 10 [USP'U]/ML
10 INJECTION, SOLUTION INTRAMUSCULAR; INTRAVENOUS
Status: DISCONTINUED | OUTPATIENT
Start: 2024-07-23 | End: 2024-07-25 | Stop reason: HOSPADM

## 2024-07-23 RX ORDER — NALOXONE HYDROCHLORIDE 0.4 MG/ML
0.2 INJECTION, SOLUTION INTRAMUSCULAR; INTRAVENOUS; SUBCUTANEOUS
Status: DISCONTINUED | OUTPATIENT
Start: 2024-07-23 | End: 2024-07-23 | Stop reason: HOSPADM

## 2024-07-23 RX ORDER — ONDANSETRON 2 MG/ML
4 INJECTION INTRAMUSCULAR; INTRAVENOUS EVERY 6 HOURS PRN
Status: DISCONTINUED | OUTPATIENT
Start: 2024-07-23 | End: 2024-07-23 | Stop reason: HOSPADM

## 2024-07-23 RX ORDER — KETOROLAC TROMETHAMINE 30 MG/ML
30 INJECTION, SOLUTION INTRAMUSCULAR; INTRAVENOUS
Status: COMPLETED | OUTPATIENT
Start: 2024-07-23 | End: 2024-07-23

## 2024-07-23 RX ORDER — IBUPROFEN 800 MG/1
800 TABLET, FILM COATED ORAL
Status: COMPLETED | OUTPATIENT
Start: 2024-07-23 | End: 2024-07-23

## 2024-07-23 RX ORDER — OXYTOCIN/0.9 % SODIUM CHLORIDE 30/500 ML
1-24 PLASTIC BAG, INJECTION (ML) INTRAVENOUS CONTINUOUS
Status: DISCONTINUED | OUTPATIENT
Start: 2024-07-23 | End: 2024-07-23 | Stop reason: HOSPADM

## 2024-07-23 RX ORDER — MODIFIED LANOLIN
OINTMENT (GRAM) TOPICAL
Status: DISCONTINUED | OUTPATIENT
Start: 2024-07-23 | End: 2024-07-25 | Stop reason: HOSPADM

## 2024-07-23 RX ORDER — OXYTOCIN/0.9 % SODIUM CHLORIDE 30/500 ML
100-340 PLASTIC BAG, INJECTION (ML) INTRAVENOUS CONTINUOUS PRN
Status: DISCONTINUED | OUTPATIENT
Start: 2024-07-23 | End: 2024-07-25 | Stop reason: HOSPADM

## 2024-07-23 RX ORDER — FENTANYL/ROPIVACAINE/NS/PF 2MCG/ML-.1
PLASTIC BAG, INJECTION (ML) EPIDURAL
Status: DISCONTINUED | OUTPATIENT
Start: 2024-07-23 | End: 2024-07-23 | Stop reason: HOSPADM

## 2024-07-23 RX ORDER — DIPHENHYDRAMINE HYDROCHLORIDE 50 MG/ML
25 INJECTION INTRAMUSCULAR; INTRAVENOUS EVERY 6 HOURS PRN
Status: DISCONTINUED | OUTPATIENT
Start: 2024-07-23 | End: 2024-07-25 | Stop reason: HOSPADM

## 2024-07-23 RX ORDER — TRANEXAMIC ACID 10 MG/ML
1 INJECTION, SOLUTION INTRAVENOUS EVERY 30 MIN PRN
Status: DISCONTINUED | OUTPATIENT
Start: 2024-07-23 | End: 2024-07-25 | Stop reason: HOSPADM

## 2024-07-23 RX ORDER — LOPERAMIDE HCL 2 MG
2 CAPSULE ORAL
Status: DISCONTINUED | OUTPATIENT
Start: 2024-07-23 | End: 2024-07-25 | Stop reason: HOSPADM

## 2024-07-23 RX ORDER — MISOPROSTOL 200 UG/1
800 TABLET ORAL
Status: DISCONTINUED | OUTPATIENT
Start: 2024-07-23 | End: 2024-07-23 | Stop reason: HOSPADM

## 2024-07-23 RX ORDER — IBUPROFEN 800 MG/1
800 TABLET, FILM COATED ORAL EVERY 6 HOURS PRN
Status: DISCONTINUED | OUTPATIENT
Start: 2024-07-23 | End: 2024-07-25 | Stop reason: HOSPADM

## 2024-07-23 RX ORDER — LIDOCAINE HYDROCHLORIDE 10 MG/ML
INJECTION, SOLUTION EPIDURAL; INFILTRATION; INTRACAUDAL; PERINEURAL PRN
Status: DISCONTINUED | OUTPATIENT
Start: 2024-07-23 | End: 2024-07-23

## 2024-07-23 RX ORDER — MISOPROSTOL 200 UG/1
400 TABLET ORAL
Status: DISCONTINUED | OUTPATIENT
Start: 2024-07-23 | End: 2024-07-25 | Stop reason: HOSPADM

## 2024-07-23 RX ORDER — LIDOCAINE 40 MG/G
CREAM TOPICAL
Status: DISCONTINUED | OUTPATIENT
Start: 2024-07-23 | End: 2024-07-23 | Stop reason: HOSPADM

## 2024-07-23 RX ORDER — FENTANYL CITRATE-0.9 % NACL/PF 10 MCG/ML
100 PLASTIC BAG, INJECTION (ML) INTRAVENOUS EVERY 5 MIN PRN
Status: DISCONTINUED | OUTPATIENT
Start: 2024-07-23 | End: 2024-07-23 | Stop reason: HOSPADM

## 2024-07-23 RX ORDER — TRANEXAMIC ACID 10 MG/ML
1 INJECTION, SOLUTION INTRAVENOUS EVERY 30 MIN PRN
Status: DISCONTINUED | OUTPATIENT
Start: 2024-07-23 | End: 2024-07-23 | Stop reason: HOSPADM

## 2024-07-23 RX ORDER — CITRIC ACID/SODIUM CITRATE 334-500MG
30 SOLUTION, ORAL ORAL
Status: DISCONTINUED | OUTPATIENT
Start: 2024-07-23 | End: 2024-07-23 | Stop reason: HOSPADM

## 2024-07-23 RX ORDER — SODIUM CHLORIDE, SODIUM LACTATE, POTASSIUM CHLORIDE, CALCIUM CHLORIDE 600; 310; 30; 20 MG/100ML; MG/100ML; MG/100ML; MG/100ML
INJECTION, SOLUTION INTRAVENOUS CONTINUOUS PRN
Status: DISCONTINUED | OUTPATIENT
Start: 2024-07-23 | End: 2024-07-23 | Stop reason: HOSPADM

## 2024-07-23 RX ORDER — METOCLOPRAMIDE HYDROCHLORIDE 5 MG/ML
10 INJECTION INTRAMUSCULAR; INTRAVENOUS EVERY 6 HOURS PRN
Status: DISCONTINUED | OUTPATIENT
Start: 2024-07-23 | End: 2024-07-23 | Stop reason: HOSPADM

## 2024-07-23 RX ORDER — LOPERAMIDE HCL 2 MG
2 CAPSULE ORAL
Status: DISCONTINUED | OUTPATIENT
Start: 2024-07-23 | End: 2024-07-23 | Stop reason: HOSPADM

## 2024-07-23 RX ORDER — OXYTOCIN 10 [USP'U]/ML
10 INJECTION, SOLUTION INTRAMUSCULAR; INTRAVENOUS
Status: DISCONTINUED | OUTPATIENT
Start: 2024-07-23 | End: 2024-07-23 | Stop reason: HOSPADM

## 2024-07-23 RX ORDER — MISOPROSTOL 200 UG/1
400 TABLET ORAL
Status: DISCONTINUED | OUTPATIENT
Start: 2024-07-23 | End: 2024-07-23 | Stop reason: HOSPADM

## 2024-07-23 RX ORDER — LOPERAMIDE HCL 2 MG
4 CAPSULE ORAL
Status: DISCONTINUED | OUTPATIENT
Start: 2024-07-23 | End: 2024-07-25 | Stop reason: HOSPADM

## 2024-07-23 RX ORDER — DOCUSATE SODIUM 100 MG/1
100 CAPSULE, LIQUID FILLED ORAL DAILY
Status: DISCONTINUED | OUTPATIENT
Start: 2024-07-24 | End: 2024-07-25 | Stop reason: HOSPADM

## 2024-07-23 RX ORDER — OXYTOCIN/0.9 % SODIUM CHLORIDE 30/500 ML
340 PLASTIC BAG, INJECTION (ML) INTRAVENOUS CONTINUOUS PRN
Status: DISCONTINUED | OUTPATIENT
Start: 2024-07-23 | End: 2024-07-25 | Stop reason: HOSPADM

## 2024-07-23 RX ORDER — METHYLERGONOVINE MALEATE 0.2 MG/ML
200 INJECTION INTRAVENOUS
Status: DISCONTINUED | OUTPATIENT
Start: 2024-07-23 | End: 2024-07-23 | Stop reason: HOSPADM

## 2024-07-23 RX ORDER — NALBUPHINE HYDROCHLORIDE 10 MG/ML
2.5-5 INJECTION, SOLUTION INTRAMUSCULAR; INTRAVENOUS; SUBCUTANEOUS EVERY 6 HOURS PRN
Status: DISCONTINUED | OUTPATIENT
Start: 2024-07-23 | End: 2024-07-25 | Stop reason: HOSPADM

## 2024-07-23 RX ORDER — FENTANYL CITRATE 50 UG/ML
50 INJECTION, SOLUTION INTRAMUSCULAR; INTRAVENOUS EVERY 30 MIN PRN
Status: DISCONTINUED | OUTPATIENT
Start: 2024-07-23 | End: 2024-07-23 | Stop reason: HOSPADM

## 2024-07-23 RX ORDER — PENICILLIN G POTASSIUM 5000000 [IU]/1
5 INJECTION, POWDER, FOR SOLUTION INTRAMUSCULAR; INTRAVENOUS ONCE
Status: COMPLETED | OUTPATIENT
Start: 2024-07-23 | End: 2024-07-23

## 2024-07-23 RX ADMIN — SODIUM CHLORIDE, POTASSIUM CHLORIDE, SODIUM LACTATE AND CALCIUM CHLORIDE: 600; 310; 30; 20 INJECTION, SOLUTION INTRAVENOUS at 09:23

## 2024-07-23 RX ADMIN — LIDOCAINE HYDROCHLORIDE 5 ML: 10 INJECTION, SOLUTION EPIDURAL; INFILTRATION; INTRACAUDAL; PERINEURAL at 18:05

## 2024-07-23 RX ADMIN — PENICILLIN G 3 MILLION UNITS: 3000000 INJECTION, SOLUTION INTRAVENOUS at 17:00

## 2024-07-23 RX ADMIN — LIDOCAINE HYDROCHLORIDE 3 ML: 10 INJECTION, SOLUTION EPIDURAL; INFILTRATION; INTRACAUDAL; PERINEURAL at 17:21

## 2024-07-23 RX ADMIN — LIDOCAINE HYDROCHLORIDE 20 ML: 10 INJECTION, SOLUTION EPIDURAL; INFILTRATION; INTRACAUDAL; PERINEURAL at 20:42

## 2024-07-23 RX ADMIN — PENICILLIN G POTASSIUM 5 MILLION UNITS: 5000000 POWDER, FOR SOLUTION INTRAMUSCULAR; INTRAPLEURAL; INTRATHECAL; INTRAVENOUS at 09:33

## 2024-07-23 RX ADMIN — PENICILLIN G 3 MILLION UNITS: 3000000 INJECTION, SOLUTION INTRAVENOUS at 13:00

## 2024-07-23 RX ADMIN — BUPIVACAINE HYDROCHLORIDE 3 ML: 2.5 INJECTION, SOLUTION EPIDURAL; INFILTRATION; INTRACAUDAL at 18:08

## 2024-07-23 RX ADMIN — Medication 340 ML/HR: at 20:42

## 2024-07-23 RX ADMIN — LIDOCAINE HYDROCHLORIDE,EPINEPHRINE BITARTRATE 3 ML: 15; .005 INJECTION, SOLUTION EPIDURAL; INFILTRATION; INTRACAUDAL; PERINEURAL at 17:16

## 2024-07-23 RX ADMIN — KETOROLAC TROMETHAMINE 30 MG: 30 INJECTION, SOLUTION INTRAMUSCULAR at 22:14

## 2024-07-23 RX ADMIN — BUPIVACAINE HYDROCHLORIDE 2 ML: 2.5 INJECTION, SOLUTION EPIDURAL; INFILTRATION; INTRACAUDAL at 18:11

## 2024-07-23 RX ADMIN — LIDOCAINE HYDROCHLORIDE,EPINEPHRINE BITARTRATE 2 ML: 15; .005 INJECTION, SOLUTION EPIDURAL; INFILTRATION; INTRACAUDAL; PERINEURAL at 17:21

## 2024-07-23 RX ADMIN — Medication 2 MILLI-UNITS/MIN: at 09:26

## 2024-07-23 RX ADMIN — Medication: at 17:15

## 2024-07-23 ASSESSMENT — ACTIVITIES OF DAILY LIVING (ADL)
ADLS_ACUITY_SCORE: 18
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
## 2024-07-23 NOTE — PROGRESS NOTES
Rounding on patient.  Under my observation, the resident physician, Dr. Belcher, performed an artificial rupture of membranes with clear fluid.  Category 1 tracing before and after rupture.  On Pitocin IV.  Patient 4 to 5 cm.  Has had 2 doses of IV antibiotics for positive group B strep.  She will decide if she would like any labor anesthesia.  /80   Pulse (!) 121   Temp 98.6  F (37  C) (Oral)   Resp 18   SpO2 98%   Christine Sood MD

## 2024-07-23 NOTE — ANESTHESIA PROCEDURE NOTES
"Epidural catheter Procedure Note    Pre-Procedure   Staff -        Anesthesiologist:  Sanaz Yo MD       Performed By: anesthesiologist       Location: OB       Procedure Start/Stop Times: 7/23/2024 5:08 PM and 7/23/2024 5:24 PM       Pre-Anesthestic Checklist: patient identified, IV checked, risks and benefits discussed, informed consent, monitors and equipment checked, pre-op evaluation, at physician/surgeon's request and post-op pain management  Timeout:       Correct Patient: Yes        Correct Procedure: Yes        Correct Site: Yes        Correct Position: Yes   Procedure Documentation  Procedure: epidural catheter       Patient Position: sitting       Patient Prep/Sterile Barriers: sterile gloves, mask       Skin prep: Chloraprep       Local skin infiltrated with 3 mL of 1% lidocaine.        Insertion Site: L3-4. (midline approach).       Technique: LORT air        DIMITRIS at 7 cm.       Needle Gauge: 18.        Needle Length (Inches): 3.5        Catheter: 20 G.          Catheter threaded easily.         5 cm epidural space.         Threaded 12 cm at skin.      Assessment/Narrative         Paresthesias: No.       Test dose of 3 mL lidocaine 1.5% w/ 1:200,000 epinephrine at 17:16 CDT.         Test dose negative, 3 minutes after injection, for signs of intravascular, subdural, or intrathecal injection.       Insertion/Infusion Method: LORT air       No aspiration negative for Heme or CSF via Epidural Catheter.    Medication(s) Administered   Medication Administration Time: 7/23/2024 5:08 PM      FOR [facility] ([facility] [place], [place]) ONLY:   Pain Team Contact information: please page the Pain Team Via [employer]. Search \"Pain\". During daytime hours, please page the attending first. At night please page the resident first.      "

## 2024-07-23 NOTE — PROGRESS NOTES
Rounding on patient.  Agree with the resident physician's note except indication for induction is maternal obesity with prepregnancy body mass index 32.  There is also suspected fetal macrosomia but that is not the indication for induction.  Surprise gender.  Last delivery she had a stage I postpartum hemorrhage and cord avulsion with manual removal of placenta and anemia with hemoglobin to 9.3.  Could consider TXA before delivery but likely the postpartum hemorrhage was secondary to the avulsed cord.  This pregnancy did have prolonged nausea and vomiting with acid reflux and Protonix resolved the symptoms.  Positive group B strep and 1 dose of antibiotics have been completed, started around 9:30 AM and completed around 10:30 AM.  Cervix 3 cm, 70%, -1 station, mid to posterior.  Category 1 fetal heart tracing.  Plan to round on patient again around 1:30 or 2 PM and perform AROM.  The antibiotics to be in her system 3 to 4 hours before rupture.  Christine Sood MD
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
## 2024-07-23 NOTE — PLAN OF CARE
Problem: Labor  Goal: Stable Fetal Wellbeing  Outcome: Progressing     Problem: Labor  Goal: Effective Progression to Delivery  Outcome: Progressing   Goal Outcome Evaluation:       Starting pitocin for movement to delivery      Cat 1 FHT

## 2024-07-24 LAB — HGB BLD-MCNC: 9.8 G/DL (ref 11.7–15.7)

## 2024-07-24 PROCEDURE — 250N000013 HC RX MED GY IP 250 OP 250 PS 637

## 2024-07-24 PROCEDURE — 85018 HEMOGLOBIN: CPT

## 2024-07-24 PROCEDURE — 722N000001 HC LABOR CARE VAGINAL DELIVERY SINGLE

## 2024-07-24 PROCEDURE — 99207 PR SUBSEQUENT HOSPITAL CARE FOR MOTHER, 15 MINUTES: CPT | Performed by: FAMILY MEDICINE

## 2024-07-24 PROCEDURE — 120N000001 HC R&B MED SURG/OB

## 2024-07-24 PROCEDURE — 36415 COLL VENOUS BLD VENIPUNCTURE: CPT

## 2024-07-24 RX ADMIN — IBUPROFEN 800 MG: 800 TABLET ORAL at 20:33

## 2024-07-24 RX ADMIN — DOCUSATE SODIUM 100 MG: 100 CAPSULE, LIQUID FILLED ORAL at 08:33

## 2024-07-24 RX ADMIN — ACETAMINOPHEN 650 MG: 325 TABLET ORAL at 08:32

## 2024-07-24 RX ADMIN — IBUPROFEN 800 MG: 800 TABLET ORAL at 03:56

## 2024-07-24 RX ADMIN — BENZOCAINE AND LEVOMENTHOL: 200; 5 SPRAY TOPICAL at 16:24

## 2024-07-24 ASSESSMENT — ACTIVITIES OF DAILY LIVING (ADL)
ADLS_ACUITY_SCORE: 18
ADLS_ACUITY_SCORE: 22
ADLS_ACUITY_SCORE: 22
ADLS_ACUITY_SCORE: 18
ADLS_ACUITY_SCORE: 22
ADLS_ACUITY_SCORE: 18
ADLS_ACUITY_SCORE: 18
ADLS_ACUITY_SCORE: 22

## 2024-07-24 NOTE — LACTATION NOTE
"This note was copied from a baby's chart.  Rounded on family for lactation support per lactation consult request. Carolyn is the second born for Rizwana. She is currently comfort breastfeeding her 19 mo old first born.  She has had a healthy milk supply for her first born with her left breast producing more than her right.  During discussion Abimbola shared struggling with the latch on the right breast    Educated/reviewed hand expression using a C Hold and \"press, compress and release\".  Mom had great success with deep breast compression. Educated/reviewed importance of achieving an asymmetrical pain free latch with breastfeeding parent's nipple pointed toward baby's nose.  Assisted the dyad to achieve a latch in the cross cradle hold.  Breast compression encouraged to optimize milk transfer. Audible swallows were present.     Educated/reviewed milk production of supply and demand.  Encouraged mom to breastfeed on demand with a goal of 8 feedings per day to help milk production. Reviewed expectation of transitional milk arriving by 3-5 days of life.  Educated on importance of frequent breastfeeding or milk expression to establish a healthy milk supply.    Educated/reviewed signs of milk transfer with gentle tug at the breast, audible swallows and wet and soiled diapers per the education folder I & O.     Reviewed use of education folder for self learning, lactation and community support, indicators to call MD and maternal/family well being.    Provided education and a resource/teaching sheet with QR codes for video support/education for:  Hand expressing and storing breastmilk  Achieving a Deep Asymmetrical Latch  Breastfeeding Positions  How to Choose a breast pump flange size   Side Lying paced bottle feeding if supplementation is needed.    Questions encouraged and addressed.    Marianna Blackman RNC, IBCLC        "

## 2024-07-24 NOTE — PLAN OF CARE
Problem: Adult Inpatient Plan of Care  Goal: Plan of Care Review  Description: The Plan of Care Review/Shift note should be completed every shift.  The Outcome Evaluation is a brief statement about your assessment that the patient is improving, declining, or no change.  This information will be displayed automatically on your shift  note.  Outcome: Progressing     Problem: Breastfeeding  Goal: Effective Breastfeeding  Outcome: Progressing  Intervention: Promote Breast Care and Comfort  Recent Flowsheet Documentation  Taken 7/24/2024 1345 by Bernice Avendaño RN  Breast Care: Breastfeeding: supportive bra utilized  Intervention: Promote Effective Breastfeeding  Recent Flowsheet Documentation  Taken 7/24/2024 1345 by Bernice Avendaño, RN  Breastfeeding Assistance: feeding cue recognition promoted  Intervention: Support Exclusive Breastfeeding Success  Recent Flowsheet Documentation  Taken 7/24/2024 1345 by Bernice Avendaño RN  Supportive Measures: active listening utilized   Goal Outcome Evaluation:  Patient is resting in bed, vitals are stable, fundus is midline, firm, 1 below the umbilicus and lochia is scant, voiding spontaneously without difficulty, ambulating in room, breast feeding infant okay, work with lactation today and did great. Bonding with her infant.

## 2024-07-24 NOTE — PLAN OF CARE
at 2030. Midline episiotomy performed, 2nd degree and periurethral lacerations repaired. . Fundus firm at umbilicus, bleeding light, no clots noted. Patient was able to ambulate in the room and void x1 without difficulty. Breastfeeding independently, minimal pain with toradol administration, ice packs and witch hazel pads.    Leona Talavera RN

## 2024-07-24 NOTE — PROGRESS NOTES
[facility]    Post-Partum Progress Note    Assessment & Plan   Assessment:  Post-partum day #1  Normal spontaneous vaginal delivery    Doing well.  No excessive bleeding  Pain well-controlled.    Plan:  Ambulation encouraged  Breast feeding strategies discussed  Lactation consultation  Pain control measures as needed  Anticipate discharge tomorrow    Christine Garcia MD     Interval History   Doing well.  Pain is adequately controlled.  No fevers.  No history of foul-smelling vaginal discharge.  Good appetite.  Denies chest pain, shortness of breath, nausea or vomiting.  Vaginal bleeding is similar to a heavy menstrual flow.  Breastfeeding well.    Medications   Current Facility-Administered Medications   Medication Dose Route Frequency Provider Last Rate Last Admin    No MMR Needed - Assessment: Patient does not need MMR vaccine   Does not apply Continuous PRN Borrego, Yenchi, DO        No Tdap Needed - Assessment: Patient does not need Tdap vaccine   Does not apply Continuous PRN Borrego, Yenchi, DO        oxytocin (PITOCIN) 30 units in 500 mL 0.9% NaCl infusion  100-340 mL/hr Intravenous Continuous PRN David Belcher DO   Stopped at 07/23/24 2315    oxytocin (PITOCIN) 30 units in 500 mL 0.9% NaCl infusion  340 mL/hr Intravenous Continuous PRN Borrego, Yenchi, DO         Current Facility-Administered Medications   Medication Dose Route Frequency Provider Last Rate Last Admin    docusate sodium (COLACE) capsule 100 mg  100 mg Oral Daily Borrego, Chloe, DO   100 mg at 07/24/24 0833       Physical Exam   Temp: 98.3  F (36.8  C) Temp src: Oral BP: 121/76 Pulse: 84   Resp: 16 SpO2: 98 % O2 Device: None (Room air)    Vitals:    07/23/24 2000   Weight: 118.4 kg (261 lb)     Vital Signs with Ranges  Temp:  [98  F (36.7  C)-98.5  F (36.9  C)] 98.3  F (36.8  C)  Pulse:  [] 84  Resp:  [16-18] 16  BP: (107-186)/(60-97) 121/76  SpO2:  [92 %-100 %] 98 %  I/O last 3 completed shifts:  In: -   Out:  1213 [Urine:800; Blood:413]    Uterine fundus is firm, non-tender and at the level of the umbilicus  Extremities Non-tender  Perineal sutures intact and wound healing well  Heart is regular rate and rhythm and lungs clear to auscultation    Data   Recent Labs   Lab Test 07/23/24  0935   AS Negative     Recent Labs   Lab Test 07/24/24  0626 07/23/24  0935   HGB 9.8* 11.0*     Recent Labs   Lab Test 01/03/24  1442   RUQIGG Positive

## 2024-07-24 NOTE — L&D DELIVERY NOTE
Delivery Summary    Abimbola Babcock MRN# [id number]   Age: 34 year old YOB: 1990     ASSESSMENT & PLAN: 34-year-old now  2 para 2 female.  Induction for maternal obesity with pregravid BMI 32.  Did  testing with NST's and biophysical profiles and baby was anticipated to be large for gestational age.  Patient had prolonged nausea and vomiting and GERD that responded to Protonix.  Positive group B strep and was adequately treated with 3 doses of antibiotics.  Induction went well with Pitocin and artificial rupture of membrane with clear fluid.  Episiotomy was performed because of difficulty picking up heart tones at the end of labor stage II.  Baby healthy with Apgars 9 and 9.  Plan AM hemoglobin.       Samantha Babcock-Abimbola [[id number]]      Labor Length      3rd Stage (hrs): 0 (min): 6          Rupture date/time: 24 1427   Rupture type: Artificial Rupture of Membranes  Fluid color: Clear  Fluid odor: Normal     Induction: Oxytocin, AROM  Induction date/time:      Cervical ripening date/time:      Indications for induction: Obesity     Augmentation: None       Delivery/Placenta Date and Time      Delivery Date: 24 Delivery Time:  8:30 PM   Placenta Date/Time: 2024  8:36 PM  Oxytocin given at the time of delivery: after delivery of placenta  Delivering clinician: Christine Sood MD   Other personnel present at delivery:  Provider Role   Leona Talavera RN Holman, Sally A, RN Fredrick, Alaina L, RN              Vaginal Counts       Initial count performed by 2 team members:  Two Team Members   Dr Barrie BRAVO RN         Needles Suture Needles Sponges (RETIRED) Instruments   Initial counts 1 1 5    Added to count       Relief counts       Final counts               Placed during labor Accounted for at the end of labor   FSE No NA   IUPC No NA   Cervidil No NA                             Apgars    Living status: Living   1 Minute 5 Minute 10 Minute 15  Minute 20 Minute   Skin color: 1  1       Heart rate: 2  2       Reflex irritability: 2  2       Muscle tone: 2  2       Respiratory effort: 2  2       Total: 9  9       Apgars assigned by: BALA AYERS RN       Cord      Cord Complications: None               Cord Blood Disposition: Lab    Gases Sent?: No    Delayed cord clamping?: Yes    Cord Clamping Delay (seconds): 31-60 seconds    Stem cell collection?: No           [place] Resuscitation    Methods: None       Labor Events and Shoulder Dystocia    Fetal Tracing Prior to Delivery: Category 1  Shoulder dystocia present?: Neg       Delivery (Maternal) (Provider to Complete) (437709)    Episiotomy: Median  Reason for episiotomy: Expected LGA baby and difficult to auscultate heart tones in end of stage 2    Perineal lacerations: 2nd Repaired?: Yes     Periurethral laceration: right Repaired?: Yes   Repair suture: 3-0 Vicryl, 4-0 Vicryl  Number of repair packets: 2  Genital tract inspection done: Pos       Blood Loss  Mother: Abimbola Babcock #[id number]     Start of Mother's Information      Delivery Blood Loss  24 0830 - 24 2140      None                 End of Mother's Information  Mother: Abimbola Babcock #[id number]                Delivery - Provider to Complete (000150)    Delivering clinician: Christine Sood MD  Delivery Type (Choose the 1 that will go to the Birth History): Vaginal, Spontaneous                         Other personnel:  Provider Role   Bala Talavera RN Holman, Sally A, RN Fredrick, Alaina L, RN                     Placenta    Date/Time: 2024  8:36 PM  Removal: Spontaneous  Disposition: Hospital disposal             Anesthesia    Method: Epidural                    Presentation and Position    Presentation: Vertex    Position: Right Occiput Anterior                     Christine Sood MD

## 2024-07-25 VITALS
RESPIRATION RATE: 16 BRPM | OXYGEN SATURATION: 98 % | BODY MASS INDEX: 39.1 KG/M2 | HEART RATE: 95 BPM | WEIGHT: 258 LBS | SYSTOLIC BLOOD PRESSURE: 120 MMHG | HEIGHT: 68 IN | TEMPERATURE: 98.4 F | DIASTOLIC BLOOD PRESSURE: 75 MMHG

## 2024-07-25 PROCEDURE — 99207 PR SUBSEQUENT HOSPITAL CARE FOR MOTHER, 15 MINUTES: CPT | Performed by: FAMILY MEDICINE

## 2024-07-25 ASSESSMENT — ACTIVITIES OF DAILY LIVING (ADL)
ADLS_ACUITY_SCORE: 18

## 2024-07-25 NOTE — PROGRESS NOTES
[facility]    Post-Partum Progress Note    Assessment & Plan   Assessment:  Post-partum day #2  Normal spontaneous vaginal delivery    Doing well.  No excessive bleeding  Pain well-controlled.    Plan:  Ambulation encouraged  Breast feeding strategies discussed  Discharge later today    Christine Garcia MD     Interval History   Doing well.  Pain is adequately controlled.  No fevers.  No history of foul-smelling vaginal discharge.  Good appetite.  Denies chest pain, shortness of breath, nausea or vomiting.  Vaginal bleeding is similar to a heavy menstrual flow.  Breastfeeding well.    Medications   Current Facility-Administered Medications   Medication Dose Route Frequency Provider Last Rate Last Admin    No MMR Needed - Assessment: Patient does not need MMR vaccine   Does not apply Continuous PRN Borreog, Yenchi, DO        No Tdap Needed - Assessment: Patient does not need Tdap vaccine   Does not apply Continuous PRN Borrego, Yenchi, DO        oxytocin (PITOCIN) 30 units in 500 mL 0.9% NaCl infusion  100-340 mL/hr Intravenous Continuous PRN David Belcher, DO   Stopped at 07/23/24 2315    oxytocin (PITOCIN) 30 units in 500 mL 0.9% NaCl infusion  340 mL/hr Intravenous Continuous PRN Borrego, Yenchi, DO         Current Facility-Administered Medications   Medication Dose Route Frequency Provider Last Rate Last Admin    docusate sodium (COLACE) capsule 100 mg  100 mg Oral Daily Borrego, Yenchi, DO   100 mg at 07/24/24 0833       Physical Exam   Temp: 98.4  F (36.9  C) Temp src: Oral BP: 112/67 Pulse: 95   Resp: 16 SpO2: 98 % O2 Device: None (Room air)    Vitals:    07/23/24 2000   Weight: 118.4 kg (261 lb)     Vital Signs with Ranges  Temp:  [98  F (36.7  C)-98.7  F (37.1  C)] 98.4  F (36.9  C)  Pulse:  [84-95] 95  Resp:  [16] 16  BP: ()/(60-76) 112/67  SpO2:  [96 %-98 %] 98 %  No intake/output data recorded.    Uterine fundus is firm, non-tender and at the level of the umbilicus  Heart is  regular rate and rhythm and lungs clear to auscultation    Data   Recent Labs   Lab Test 07/23/24  0935   AS Negative     Recent Labs   Lab Test 07/24/24  0626 07/23/24  0935   HGB 9.8* 11.0*     Recent Labs   Lab Test 01/03/24  1442   RUQIGG Positive

## 2024-07-25 NOTE — DISCHARGE SUMMARY
[facility]    Discharge Summary  Obstetrics    Date of Admission:  2024  Date of Discharge:  2024  Discharging Provider: Christine Garcia MD    Discharge Diagnoses       History of Present Illness   Abimbola Babcock is a 34 year old female who presented with induction for maternal BMI 32    Hospital Course   The patient's hospital course was unremarkable.  She recovered as anticipated and experienced no post-delivery complications.  On discharge, her pain was well controlled. Vaginal bleeding is similar to peak menstrual flow.  Voiding without difficulty.  Ambulating well and tolerating a normal diet.  No fevers.  Breastfeeding well.  Infant is stable.  She was discharged on post-partum day #2.    Post-partum hemoglobin:   Hemoglobin   Date Value Ref Range Status   2024 9.8 (L) 11.7 - 15.7 g/dL Final   10/23/2019 13.5 11.7 - 15.7 g/dL Final       [place] Depression Scale  Thoughts of Harming Self: Never  Total Score: 2      Christine Garcia MD    Discharge Disposition   Discharged to home   Condition at discharge: Stable    Primary Care Physician   Christine Sood    Consultations This Hospital Stay   LACTATION IP CONSULT    Discharge Orders      Breast pump - Manual/Electric    Breast Pump Documentation:  Manual/Electric Pump: To support adequate breast milk production and nutrition for infant.     I, the undersigned, certify that the above prescribed supplies are medically necessary for this patient and is both reasonable and necessary in reference to accepted standards of medical and necessary in reference to accepted standards of medical practice in the treatment of this patient's condition and is not prescribed as a convenience.     Discharge Medications   Current Discharge Medication List        CONTINUE these medications which have NOT CHANGED    Details   fish oil-omega-3 fatty acids 1000 MG capsule Take 2 g by mouth daily      pantoprazole  (PROTONIX) 20 MG EC tablet Take 1 tablet (20 mg) by mouth daily  Qty: 90 tablet, Refills: 3    Associated Diagnoses: Gastroesophageal reflux disease with esophagitis without hemorrhage      Prenatal Vit-Fe Fumarate-FA (PRENATAL MULTIVITAMIN W/IRON) 27-0.8 MG tablet Take 1 tablet by mouth daily      Vitamin D3 (CHOLECALCIFEROL) 125 MCG (5000 UT) tablet Take by mouth daily           Allergies   Allergies   Allergen Reactions    Crab (Diagnostic)     Morphine And Codeine     Shrimp

## 2024-07-25 NOTE — PLAN OF CARE
Goal Outcome Evaluation:       Patient is ready for discharge. All discharge instructions discussed. Patient offers no questions or concerns.

## 2024-07-25 NOTE — PLAN OF CARE
Goal Outcome Evaluation:      Plan of Care Reviewed With: patient    Overall Patient Progress: improvingOverall Patient Progress: improving           Problem: Adult Inpatient Plan of Care  Goal: Optimal Comfort and Wellbeing  Outcome: Progressing     Problem: Adult Inpatient Plan of Care  Goal: Absence of Hospital-Acquired Illness or Injury  Outcome: Progressing  Intervention: Prevent Skin Injury  Recent Flowsheet Documentation  Taken 7/25/2024 0001 by Olesya Knapp RN  Body Position: position changed independently    Plan for discharge this am, independent with ADLS and breastfeeding.

## 2024-07-25 NOTE — DISCHARGE INSTRUCTIONS
Warning Signs after Having a Baby    Keep this paper on your fridge or somewhere else where you can see it.    Call your provider if you have any of these symptoms up to 12 weeks after having your baby.    Thoughts of hurting yourself or your baby  Pain in your chest or trouble breathing  Severe headache not helped by pain medicine  Eyesight concerns (blurry vision, seeing spots or flashes of light, other changes to eyesight)  Fainting, shaking or other signs of a seizure    Call 9-1-1 if you feel that it is an emergency.     The symptoms below can happen to anyone after giving birth. They can be very serious. Call your provider if you have any of these warning signs.    My provider s phone number: _______________________    Losing too much blood (hemorrhage)    Call your provider if you soak through a pad in less than an hour or pass blood clots bigger than a golf ball. These may be signs that you are bleeding too much.    Blood clots in the legs or lungs    After you give birth, your body naturally clots its blood to help prevent blood loss. Sometimes this increased clotting can happen in other areas of the body, like the legs or lungs. This can block your blood flow and be very dangerous.     Call your provider if you:  Have a red, swollen spot on the back of your leg that is warm or painful when you touch it.   Are coughing up blood.     Infection    Call your provider if you have any of these symptoms:  Fever of 100.4 F (38 C) or higher.  Pain or redness around your stitches if you had an incision.   Any yellow, white, or green fluid coming from places where you had stitches or surgery.    Mood Problems (postpartum depression)    Many people feel sad or have mood changes after having a baby. But for some people, these mood swings are worse.     Call your provider right away if you feel so anxious or nervous that you can't care for yourself or your baby.    Preeclampsia (high blood pressure)    Even if you  didn't have high blood pressure when you were pregnant, you are at risk for the high blood pressure disease called preeclampsia. This risk can last up to 12 weeks after giving birth.     Call your provider if you have:   Pain on your right side under your rib cage  Sudden swelling in the hands and face    Remember: You know your body. If something doesn't feel right, get medical help.     For informational purposes only. Not to replace the advice of your health care provider. Copyright 2020 [facility]. All rights reserved. Clinically reviewed by Lexii Fabian, RNC-OB, MSN. [place] [id number] - Rev 02/23.

## 2024-07-26 ENCOUNTER — PATIENT OUTREACH (OUTPATIENT)
Dept: CARE COORDINATION | Facility: CLINIC | Age: 34
End: 2024-07-26
Payer: COMMERCIAL

## 2024-07-26 NOTE — PROGRESS NOTES
[facility]: Transitions of Care Outreach  Chief Complaint   Patient presents with    Clinic Care Coordination - Post Hospital       Most Recent Admission Date: 7/23/2024   Most Recent Admission Diagnosis:      Most Recent Discharge Date: 7/25/2024   Most Recent Discharge Diagnosis: 40 weeks gestation of pregnancy - Z3A.40     Transitions of Care Assessment    Discharge Assessment  How are you doing now that you are home?: Patient is doing well at home with baby, they have no questions or concerns.  How are your symptoms? (Red Flag symptoms escalate to triage hotline per guidelines): Improved  Do you know how to contact your clinic care team if you have future questions or changes to your health status? : Yes  Does the patient have their discharge instructions? : Yes  Does the patient have questions regarding their discharge instructions? : No  Were you started on any new medications or were there changes to any of your previous medications? : Yes  Does the patient have all of their medications?: Yes  Do you have questions regarding any of your medications? : No  Do you have all of your needed medical supplies or equipment (DME)?  (i.e. oxygen tank, CPAP, cane, etc.): Yes           Follow up Plan   Follow Up  Follow up with provider in 2 weeks and 6 weeks for post-delivery checks  Discharge Follow-Up  Discharge follow up appointment scheduled in alignment with recommended follow up timeframe or Transitions of Risk Category? (Low = within 30 days; Moderate= within 14 days; High= within 7 days): Yes    No future appointments.    Outpatient Plan as outlined on AVS reviewed with patient.    For any urgent concerns, please contact our 24 hour nurse triage line: [phone] ([phone])       GHADA Rizo (she/her/hers)  Social Work Clinic Care Coordinator   [facility]  [email or web address]  [phone]

## 2024-07-29 ENCOUNTER — MEDICAL CORRESPONDENCE (OUTPATIENT)
Dept: HEALTH INFORMATION MANAGEMENT | Facility: CLINIC | Age: 34
End: 2024-07-29
Payer: COMMERCIAL

## 2024-08-29 ENCOUNTER — MEDICAL CORRESPONDENCE (OUTPATIENT)
Dept: HEALTH INFORMATION MANAGEMENT | Facility: CLINIC | Age: 34
End: 2024-08-29
Payer: COMMERCIAL

## 2024-09-12 ENCOUNTER — PRENATAL OFFICE VISIT (OUTPATIENT)
Dept: FAMILY MEDICINE | Facility: CLINIC | Age: 34
End: 2024-09-12
Payer: COMMERCIAL

## 2024-09-12 VITALS
WEIGHT: 243.4 LBS | BODY MASS INDEX: 36.89 KG/M2 | TEMPERATURE: 97.8 F | OXYGEN SATURATION: 98 % | RESPIRATION RATE: 16 BRPM | HEART RATE: 71 BPM | HEIGHT: 68 IN | SYSTOLIC BLOOD PRESSURE: 101 MMHG | DIASTOLIC BLOOD PRESSURE: 76 MMHG

## 2024-09-12 DIAGNOSIS — Z23 NEED FOR PROPHYLACTIC VACCINATION AND INOCULATION AGAINST INFLUENZA: ICD-10-CM

## 2024-09-12 LAB — HGB BLD-MCNC: 13.2 G/DL (ref 11.7–15.7)

## 2024-09-12 PROCEDURE — 85018 HEMOGLOBIN: CPT | Performed by: FAMILY MEDICINE

## 2024-09-12 PROCEDURE — G0145 SCR C/V CYTO,THINLAYER,RESCR: HCPCS | Performed by: FAMILY MEDICINE

## 2024-09-12 PROCEDURE — 36415 COLL VENOUS BLD VENIPUNCTURE: CPT | Performed by: FAMILY MEDICINE

## 2024-09-12 PROCEDURE — 99207 PR POST PARTUM EXAM: CPT | Performed by: FAMILY MEDICINE

## 2024-09-12 PROCEDURE — 87624 HPV HI-RISK TYP POOLED RSLT: CPT | Performed by: FAMILY MEDICINE

## 2024-09-12 PROCEDURE — 90656 IIV3 VACC NO PRSV 0.5 ML IM: CPT | Performed by: FAMILY MEDICINE

## 2024-09-12 PROCEDURE — 90471 IMMUNIZATION ADMIN: CPT | Performed by: FAMILY MEDICINE

## 2024-09-12 RX ORDER — ACETAMINOPHEN AND CODEINE PHOSPHATE 120; 12 MG/5ML; MG/5ML
0.35 SOLUTION ORAL DAILY
Qty: 90 TABLET | Refills: 3 | Status: SHIPPED | OUTPATIENT
Start: 2024-09-12

## 2024-09-12 NOTE — PATIENT INSTRUCTIONS
The progesterone only pill is safe with breast-feeding.  You can start that at any time and it is the same pill every day.  There is no placebo week where you do not take the pill.  When you start birth control you do want to use condoms for 1 month.  If you has been on the pill for 1 month but you miss a pill you take the pill when you remember and you should still be good for birth control.  If you miss 2 pills you take 2 and you remember to the next day and backup protection with condoms for a month.      When done breast-feeding we do want to change over to an estrogen and progesterone birth control because that is more effective.  Then again back protection with condoms for a month.    Note to go back to training at your gym.    Hemoglobin today.    Flu shot today.

## 2024-09-12 NOTE — PROGRESS NOTES
ASSESSMENT:   Normal postpartum exam after .    ICD-10-CM    1. Routine postpartum follow-up  Z39.2 Hemoglobin     norethindrone (MICRONOR) 0.35 MG tablet     HPV and Gynecologic Cytology Panel - Recommended Age 30-65 Years     Hemoglobin      2. Need for prophylactic vaccination and inoculation against influenza  Z23         The progesterone only pill is safe with breast-feeding.  You can start that at any time and it is the same pill every day.  There is no placebo week where you do not take the pill.  When you start birth control you do want to use condoms for 1 month.  If you has been on the pill for 1 month but you miss a pill you take the pill when you remember and you should still be good for birth control.  If you miss 2 pills you take 2 and you remember to the next day and backup protection with condoms for a month.      When done breast-feeding we do want to change over to an estrogen and progesterone birth control because that is more effective.  Then again back protection with condoms for a month.    Note to go back to training at your gym.    Hemoglobin today.    Flu shot today.    PLAN:  Return as needed or at time of next expected pap, pelvic, or breast exam.    Abimbola is here for a 6-week postpartum checkup.    Delivery date was 24. She had a  of a viable girl, weight 8 pounds 13 oz., with none complications.  Since delivery, she has been breast feeding.  She has No signs of infection, bleeding or other complications.  She is not pregnant.  We discussed contraceptions and she has chosen mini pill / progesterone only pill.  She  has not had intercourse since delivery and complains of No discomfort.  GERD gone and off Protonix.  She is feeling well and would like to go back to training at her gym.  She needs a note for them to say that is.    EXAM:  ABDOMEN: soft, nontender, without hepatosplenomegaly or masses  BREAST: normal without masses, tenderness or nipple discharge and no palpable  axillary masses or adenopathy  PELVIC: external genitalia normal, cervix clean, vagina clean and well supported, fundus involuted, anterior normal, adenxa without masses or tenderness, rectal vaginal exam normal    Prior to immunization administration, verified patients identity using patient s name and date of birth. Please see Immunization Activity for additional information.     Screening Questionnaire for Adult Immunization    Are you sick today?   No   Do you have allergies to medications, food, a vaccine component or latex?   No   Have you ever had a serious reaction after receiving a vaccination?   No   Do you have a long-term health problem with heart, lung, kidney, or metabolic disease (e.g., diabetes), asthma, a blood disorder, no spleen, complement component deficiency, a cochlear implant, or a spinal fluid leak?  Are you on long-term aspirin therapy?   No   Do you have cancer, leukemia, HIV/AIDS, or any other immune system problem?   No   Do you have a parent, brother, or sister with an immune system problem?   No   In the past 3 months, have you taken medications that affect  your immune system, such as prednisone, other steroids, or anticancer drugs; drugs for the treatment of rheumatoid arthritis, Crohn s disease, or psoriasis; or have you had radiation treatments?   No   Have you had a seizure, or a brain or other nervous system problem?   No   During the past year, have you received a transfusion of blood or blood    products, or been given immune (gamma) globulin or antiviral drug?   No   For women: Are you pregnant or is there a chance you could become       pregnant during the next month?   No   Have you received any vaccinations in the past 4 weeks?   No     Immunization questionnaire answers were all negative.      Patient instructed to remain in clinic for 15 minutes afterwards, and to report any adverse reactions.     Screening performed by Olesya King MA on 9/12/2024 at 12:06 PM.

## 2024-09-12 NOTE — LETTER
Abimbola Babcock  1990    September 12, 2024    To whom it may concern:    This patient is 6 weeks post a normal vaginal delivery and she is cleared for any physical activity including training at her gym.        Sincerely,        Christine Sood MD

## 2024-09-13 LAB
HPV HR 12 DNA CVX QL NAA+PROBE: NEGATIVE
HPV16 DNA CVX QL NAA+PROBE: NEGATIVE
HPV18 DNA CVX QL NAA+PROBE: NEGATIVE
HUMAN PAPILLOMA VIRUS FINAL DIAGNOSIS: NORMAL

## 2024-09-17 LAB
BKR AP ASSOCIATED HPV REPORT: NORMAL
BKR LAB AP GYN ADEQUACY: NORMAL
BKR LAB AP GYN INTERPRETATION: NORMAL
BKR LAB AP PREVIOUS ABNORMAL: NORMAL
PATH REPORT.COMMENTS IMP SPEC: NORMAL
PATH REPORT.COMMENTS IMP SPEC: NORMAL
PATH REPORT.RELEVANT HX SPEC: NORMAL

## 2024-10-09 ENCOUNTER — ALLIED HEALTH/NURSE VISIT (OUTPATIENT)
Dept: FAMILY MEDICINE | Facility: CLINIC | Age: 34
End: 2024-10-09
Payer: COMMERCIAL

## 2024-10-09 DIAGNOSIS — Z23 IMMUNIZATION DUE: Primary | ICD-10-CM

## 2024-10-09 PROCEDURE — 91320 SARSCV2 VAC 30MCG TRS-SUC IM: CPT

## 2024-10-09 PROCEDURE — 90480 ADMN SARSCOV2 VAC 1/ONLY CMP: CPT

## 2024-10-09 PROCEDURE — 99207 PR NO CHARGE NURSE ONLY: CPT

## 2024-11-25 ENCOUNTER — E-VISIT (OUTPATIENT)
Dept: FAMILY MEDICINE | Facility: CLINIC | Age: 34
End: 2024-11-25
Payer: COMMERCIAL

## 2024-11-25 ENCOUNTER — MEDICAL CORRESPONDENCE (OUTPATIENT)
Dept: HEALTH INFORMATION MANAGEMENT | Facility: CLINIC | Age: 34
End: 2024-11-25

## 2024-11-25 ENCOUNTER — TELEPHONE (OUTPATIENT)
Dept: FAMILY MEDICINE | Facility: CLINIC | Age: 34
End: 2024-11-25

## 2024-11-25 DIAGNOSIS — L02.91 ABSCESS: Primary | ICD-10-CM

## 2024-11-25 RX ORDER — CEPHALEXIN 500 MG/1
500 CAPSULE ORAL 2 TIMES DAILY
Qty: 14 CAPSULE | Refills: 0 | Status: SHIPPED | OUTPATIENT
Start: 2024-11-25 | End: 2024-12-02

## 2024-11-25 NOTE — TELEPHONE ENCOUNTER
General Call    Contacts       Contact Date/Time Type Contact Phone/Fax    11/25/2024 01:36 PM CST Phone (Incoming) Abimbola Babcock (Self) [phone] (M)          Reason for Call:  patient called in concerned she might have the starting of mastitis. Told me it does not hurt to breastfeed yet but she does have two red spots on her breast that are worry some.   I spoke with Dr. Sood about this and she told me that she needs to submit an e-visit and she will then take a look at this.   I called back Abimbola and she told me she would submit this ASAP.       [facility] or would you prefer to receive a phone call?:   Patient would prefer a phone call   Okay to leave a detailed message?: Yes at Cell number on file:    Telephone Information:   Mobile [phone]

## 2024-11-25 NOTE — TELEPHONE ENCOUNTER
Provider E-Visit time total (minutes): 6  Chills last night  O: 2 pustules on left breast, tender to touch.

## 2024-12-05 ENCOUNTER — TELEPHONE (OUTPATIENT)
Dept: FAMILY MEDICINE | Facility: CLINIC | Age: 34
End: 2024-12-05

## 2024-12-05 DIAGNOSIS — Z01.84 IMMUNITY STATUS TESTING: Primary | ICD-10-CM

## 2024-12-05 NOTE — CONFIDENTIAL NOTE
Patient is scheduled for a lab only appointment 12/5/2024 at 915am. Patient is requesting to have labs drawn however we do not have orders.     Please place orders for TB Gold and Varicella titer if appropriate.

## 2024-12-05 NOTE — TELEPHONE ENCOUNTER
I do not have any future orders in the computer, can you please see what she is requesting?  Thank you.

## 2024-12-07 ENCOUNTER — LAB (OUTPATIENT)
Dept: LAB | Facility: CLINIC | Age: 34
End: 2024-12-07
Payer: COMMERCIAL

## 2024-12-07 DIAGNOSIS — Z01.84 IMMUNITY STATUS TESTING: ICD-10-CM

## 2024-12-07 PROCEDURE — 36415 COLL VENOUS BLD VENIPUNCTURE: CPT

## 2024-12-07 PROCEDURE — 86787 VARICELLA-ZOSTER ANTIBODY: CPT

## 2024-12-07 PROCEDURE — 86481 TB AG RESPONSE T-CELL SUSP: CPT

## 2024-12-09 LAB
GAMMA INTERFERON BACKGROUND BLD IA-ACNC: 0.04 IU/ML
M TB IFN-G BLD-IMP: NEGATIVE
M TB IFN-G CD4+ BCKGRND COR BLD-ACNC: 9.96 IU/ML
MITOGEN IGNF BCKGRD COR BLD-ACNC: -0.01 IU/ML
MITOGEN IGNF BCKGRD COR BLD-ACNC: -0.01 IU/ML
QUANTIFERON MITOGEN: 10 IU/ML
QUANTIFERON NIL TUBE: 0.04 IU/ML
QUANTIFERON TB1 TUBE: 0.03 IU/ML
QUANTIFERON TB2 TUBE: 0.03
VZV IGG SER QL IA: 11.3 S/CO
VZV IGG SER QL IA: POSITIVE

## 2025-06-21 ENCOUNTER — HEALTH MAINTENANCE LETTER (OUTPATIENT)
Age: 35
End: 2025-06-21